# Patient Record
Sex: FEMALE | Race: WHITE | NOT HISPANIC OR LATINO | Employment: OTHER | URBAN - METROPOLITAN AREA
[De-identification: names, ages, dates, MRNs, and addresses within clinical notes are randomized per-mention and may not be internally consistent; named-entity substitution may affect disease eponyms.]

---

## 2017-03-06 ENCOUNTER — HOSPITAL ENCOUNTER (OUTPATIENT)
Facility: MEDICAL CENTER | Age: 82
End: 2017-03-06
Attending: PHYSICIAN ASSISTANT
Payer: MEDICARE

## 2017-03-06 PROCEDURE — 87086 URINE CULTURE/COLONY COUNT: CPT

## 2017-03-09 LAB
BACTERIA UR CULT: NORMAL
SIGNIFICANT IND 70042: NORMAL
SOURCE SOURCE: NORMAL

## 2017-03-17 ENCOUNTER — APPOINTMENT (OUTPATIENT)
Dept: PAIN MANAGEMENT | Facility: REHABILITATION | Age: 82
End: 2017-03-17
Attending: PHYSICAL MEDICINE & REHABILITATION
Payer: MEDICARE

## 2017-04-14 ENCOUNTER — HOSPITAL ENCOUNTER (OUTPATIENT)
Dept: RADIOLOGY | Facility: REHABILITATION | Age: 82
End: 2017-04-14
Attending: PHYSICAL MEDICINE & REHABILITATION
Payer: MEDICARE

## 2017-04-14 ENCOUNTER — HOSPITAL ENCOUNTER (OUTPATIENT)
Dept: PAIN MANAGEMENT | Facility: REHABILITATION | Age: 82
End: 2017-04-14
Attending: PHYSICAL MEDICINE & REHABILITATION
Payer: MEDICARE

## 2017-04-14 VITALS
TEMPERATURE: 97 F | BODY MASS INDEX: 19.76 KG/M2 | HEART RATE: 97 BPM | OXYGEN SATURATION: 100 % | SYSTOLIC BLOOD PRESSURE: 112 MMHG | DIASTOLIC BLOOD PRESSURE: 70 MMHG | RESPIRATION RATE: 18 BRPM | WEIGHT: 115.74 LBS | HEIGHT: 64 IN

## 2017-04-14 PROCEDURE — G0260 INJ FOR SACROILIAC JT ANESTH: HCPCS

## 2017-04-14 PROCEDURE — 700111 HCHG RX REV CODE 636 W/ 250 OVERRIDE (IP)

## 2017-04-14 PROCEDURE — 700101 HCHG RX REV CODE 250

## 2017-04-14 PROCEDURE — 700117 HCHG RX CONTRAST REV CODE 255

## 2017-04-14 RX ORDER — LIDOCAINE HYDROCHLORIDE 20 MG/ML
INJECTION, SOLUTION EPIDURAL; INFILTRATION; INTRACAUDAL; PERINEURAL
Status: COMPLETED
Start: 2017-04-14 | End: 2017-04-14

## 2017-04-14 RX ORDER — TRIAMCINOLONE ACETONIDE 40 MG/ML
INJECTION, SUSPENSION INTRA-ARTICULAR; INTRAMUSCULAR
Status: COMPLETED
Start: 2017-04-14 | End: 2017-04-14

## 2017-04-14 RX ADMIN — TRIAMCINOLONE ACETONIDE 80 MG: 40 INJECTION, SUSPENSION INTRA-ARTICULAR; INTRAMUSCULAR at 10:37

## 2017-04-14 RX ADMIN — IOHEXOL 1 ML: 240 INJECTION, SOLUTION INTRATHECAL; INTRAVASCULAR; INTRAVENOUS; ORAL at 10:36

## 2017-04-14 RX ADMIN — LIDOCAINE HYDROCHLORIDE 5 ML: 20 INJECTION, SOLUTION EPIDURAL; INFILTRATION; INTRACAUDAL; PERINEURAL at 10:34

## 2017-04-14 ASSESSMENT — PAIN SCALES - GENERAL
PAINLEVEL_OUTOF10: 1
PAINLEVEL_OUTOF10: 4

## 2017-04-14 NOTE — PROGRESS NOTES
Pt positioned prone by Mell HERNANDEZ RN and Yuri. Arms resting on table . Pillow placed under feet and lower legs by RN.

## 2017-04-14 NOTE — PROGRESS NOTES
Current meds. See medication reconciliation form. Reviewed with pt. Pt denies taking ASA,other blood thinners or anti-inflammatories. Pt has a ride post-procedure (Elsy, daughter is ). Printed and verbal discharge instructions given to pt who verbalized understanding.

## 2017-04-15 NOTE — PROCEDURES
DATE OF SERVICE:  04/14/2017    PROCEDURES PERFORMED:  1.  Left sacroiliac joint injection with 40 mg of Kenalog under fluoroscopic   guidance.  2.  Right sacroiliac joint injection with 40 mg of Kenalog under fluoroscopic   guidance.    INDICATIONS:  The patient is a patient of [facility] with low   back and buttock region pain, which is felt to be SI joint mediated.  She had   significant improvement with sacroiliac joint injections in September 2016,   but has had return of symptoms, so a repeat procedure was chosen for today.    DESCRIPTION OF PROCEDURE:  After appropriate informed consent was obtained,   she was placed prone on the table.    Her skin was thoroughly cleansed with Betadine swabs x3.    Following this, the subcutaneous, intramuscular, and interligamentous region   was anesthetized with lidocaine.    A 3-1/2 inch 22-gauge spinal needle was directed under intermittent   fluoroscopic guidance to the inferior portion of the sacroiliac joints   bilaterally.    ARTHROGRAM:  Once the joints were felt to have been cannulated, a small amount   of Omnipaque was placed in the joint, which confirmed needle tip placement by   darkening of the joint line.    Lateral views were obtained to ensure proper depth and dye flow.    Kenalog 40 mg along with 0.5 mL of 2% lidocaine was placed into the sacroiliac   joints bilaterally.    She tolerated the procedure well without procedure complications.    She was referred to the recovery area and will follow up in the office in the   next 2-3 weeks to monitor response to the injection.       ____________________________________     MD GERARDO Kurtz / VIDAL    DD:  04/14/2017 10:50:12  DT:  04/14/2017 22:12:07    D#:  [id number]  Job#:  [id number]    cc: AL CRAIG MD

## 2017-05-03 ENCOUNTER — OFFICE VISIT (OUTPATIENT)
Dept: URGENT CARE | Facility: PHYSICIAN GROUP | Age: 82
End: 2017-05-03
Payer: MEDICARE

## 2017-05-03 VITALS
BODY MASS INDEX: 19.22 KG/M2 | TEMPERATURE: 99.1 F | RESPIRATION RATE: 20 BRPM | SYSTOLIC BLOOD PRESSURE: 126 MMHG | HEART RATE: 89 BPM | WEIGHT: 112 LBS | OXYGEN SATURATION: 97 % | DIASTOLIC BLOOD PRESSURE: 46 MMHG

## 2017-05-03 DIAGNOSIS — S41.111A LACERATION OF ARM, RIGHT, INITIAL ENCOUNTER: ICD-10-CM

## 2017-05-03 PROCEDURE — 1036F TOBACCO NON-USER: CPT | Performed by: EMERGENCY MEDICINE

## 2017-05-03 PROCEDURE — G8420 CALC BMI NORM PARAMETERS: HCPCS | Performed by: EMERGENCY MEDICINE

## 2017-05-03 PROCEDURE — 99213 OFFICE O/P EST LOW 20 MIN: CPT | Mod: 25 | Performed by: EMERGENCY MEDICINE

## 2017-05-03 PROCEDURE — 90715 TDAP VACCINE 7 YRS/> IM: CPT | Performed by: EMERGENCY MEDICINE

## 2017-05-03 PROCEDURE — G8432 DEP SCR NOT DOC, RNG: HCPCS | Performed by: EMERGENCY MEDICINE

## 2017-05-03 PROCEDURE — 1101F PT FALLS ASSESS-DOCD LE1/YR: CPT | Mod: 8P | Performed by: EMERGENCY MEDICINE

## 2017-05-03 PROCEDURE — 90471 IMMUNIZATION ADMIN: CPT | Performed by: EMERGENCY MEDICINE

## 2017-05-03 PROCEDURE — 4040F PNEUMOC VAC/ADMIN/RCVD: CPT | Performed by: EMERGENCY MEDICINE

## 2017-05-03 ASSESSMENT — ENCOUNTER SYMPTOMS
COUGH: 0
SPEECH CHANGE: 0
HEADACHES: 0
ABDOMINAL PAIN: 0
SENSORY CHANGE: 0
EYE REDNESS: 0
BACK PAIN: 0
NAUSEA: 0
NECK PAIN: 0
EYE DISCHARGE: 0
FEVER: 0
VOMITING: 0
CHILLS: 0
NERVOUS/ANXIOUS: 0

## 2017-05-03 NOTE — MR AVS SNAPSHOT
Mila Scanlon   5/3/2017 4:15 PM   Office Visit   MRN: [id number]    Department:  [facility]   Dept Phone:  [phone]    Description:  Female : 1926   Provider:  ANIRUDH Talbot M.D.           Reason for Visit     Laceration Lt arm laceration occured when pt fell this afternoon. Pt was alone at the time       Allergies as of 5/3/2017     Allergen Noted Reactions    Ciprofloxacin 2015   Nausea      Vital Signs     Blood Pressure Pulse Temperature Respirations Weight Oxygen Saturation    126/46 mmHg 89 37.3 °C (99.1 °F) 20 50.803 kg (112 lb) 97%    Smoking Status                   Former Smoker           Basic Information     Date Of Birth Sex Race Ethnicity Preferred Language    1926 Female White Non- English      Problem List              ICD-10-CM Priority Class Noted - Resolved    Back pain M54.9   2016 - Present    Weakness R53.1   2016 - Present      Health Maintenance        Date Due Completion Dates    IMM DTaP/Tdap/Td Vaccine (1 - Tdap) 1945 ---    PAP SMEAR 1947 ---    MAMMOGRAM 1966 ---    COLONOSCOPY 1976 ---    IMM ZOSTER VACCINE 1986 ---    BONE DENSITY 1991 ---    IMM PNEUMOCOCCAL 65+ (ADULT) LOW/MEDIUM RISK SERIES (2 of 2 - PPSV23) 2017            Current Immunizations     13-VALENT PCV PREVNAR 2016    Influenza Vaccine Adult HD 10/20/2015  9:42 AM      Below and/or attached are the medications your provider expects you to take. Review all of your home medications and newly ordered medications with your provider and/or pharmacist. Follow medication instructions as directed by your provider and/or pharmacist. Please keep your medication list with you and share with your provider. Update the information when medications are discontinued, doses are changed, or new medications (including over-the-counter products) are added; and carry medication information at all times in the event of emergency  situations     Allergies:  CIPROFLOXACIN - Nausea               Medications  Valid as of: May 03, 2017 -  4:54 PM    Generic Name Brand Name Tablet Size Instructions for use    Acetaminophen (Tab) TYLENOL 500 MG Take 500 mg by mouth every 6 hours as needed for Moderate Pain.        ALPRAZolam (Tab) XANAX 1 MG Take 1 mg by mouth at bedtime as needed for Sleep.        Lisinopril (Tab) PRINIVIL 20 MG Take 20 mg by mouth 2 times a day.        Lovastatin (Tab) MEVACOR 40 MG Take 40 mg by mouth every evening.        .                 Medicines prescribed today were sent to:     [facility]ACY #[id number] - [place] - [address] [place] [address]    Phone: [phone] Fax: [phone]    Open 24 Hours?: No      Medication refill instructions:       If your prescription bottle indicates you have medication refills left, it is not necessary to call your provider’s office. Please contact your pharmacy and they will refill your medication.    If your prescription bottle indicates you do not have any refills left, you may request refills at any time through one of the following ways: The online [employer] system (except Urgent Care), by calling your provider’s office, or by asking your pharmacy to contact your provider’s office with a refill request. Medication refills are processed only during regular business hours and may not be available until the next business day. Your provider may request additional information or to have a follow-up visit with you prior to refilling your medication.   *Please Note: Medication refills are assigned a new Rx number when refilled electronically. Your pharmacy may indicate that no refills were authorized even though a new prescription for the same medication is available at the pharmacy. Please request the medicine by name with the pharmacy before contacting your provider for a refill.           [employer] Access Code: [email or web address]  Expires: 6/2/2017  4:54 PM    [employer]  A secure,  online tool to manage your health information     [employer]’s Xormis® is a secure, online tool that connects you to your personalized health information from the privacy of your home -- day or night - making it very easy for you to manage your healthcare. Once the activation process is completed, you can even access your medical information using the Xormis gagan, which is available for free in the Apple Gagan store or Google Play store.     Xormis provides the following levels of access (as shown below):   My Chart Features   [facility] Primary Care Doctor [facility]  Specialists [facility]  Urgent  Care Non-[facility]  Primary Care  Doctor   Email your healthcare team securely and privately 24/7 X X X    Manage appointments: schedule your next appointment; view details of past/upcoming appointments X      Request prescription refills. X      View recent personal medical records, including lab and immunizations X X X X   View health record, including health history, allergies, medications X X X X   Read reports about your outpatient visits, procedures, consult and ER notes X X X X   See your discharge summary, which is a recap of your hospital and/or ER visit that includes your diagnosis, lab results, and care plan. X X       How to register for Xormis:  1. Go to  [email or web address].  2. Click on the Sign Up Now box, which takes you to the New Member Sign Up page. You will need to provide the following information:  a. Enter your Xormis Access Code exactly as it appears at the top of this page. (You will not need to use this code after you’ve completed the sign-up process. If you do not sign up before the expiration date, you must request a new code.)   b. Enter your date of birth.   c. Enter your home email address.   d. Click Submit, and follow the next screen’s instructions.  3. Create a Xormis ID. This will be your Xormis login ID and cannot be changed, so think of one that is secure and easy to  remember.  4. Create a [employer] password. You can change your password at any time.  5. Enter your Password Reset Question and Answer. This can be used at a later time if you forget your password.   6. Enter your e-mail address. This allows you to receive e-mail notifications when new information is available in [employer].  7. Click Sign Up. You can now view your health information.    For assistance activating your [employer] account, call [phone]

## 2017-05-03 NOTE — PROGRESS NOTES
Subjective:      Mila Scanlon is a 89 y.o. female who presents with Laceration            Laceration   The incident occurred 1 to 3 hours ago. The laceration is located on the left arm and right arm (patient fell approximately an hour ago at home causing lacerations to her very thin skin of her left arm and right arm at the elbow. The left arm laceration is greater than 11cm probably closer to 20. There is no extremity pain or numbness., full ROM.). The laceration mechanism was a blunt object. The pain is moderate. The pain has been constant since onset. She reports no foreign bodies present. Her tetanus status is UTD.     Allergies   Allergen Reactions   • Ciprofloxacin Nausea     Social History     Social History   • Marital Status:      Spouse Name: N/A   • Number of Children: N/A   • Years of Education: N/A     Occupational History   • Not on file.     Social History Main Topics   • Smoking status: Former Smoker     Quit date: 10/01/2015   • Smokeless tobacco: Not on file   • Alcohol Use: No   • Drug Use: No   • Sexual Activity: Not Currently     Other Topics Concern   • Not on file     Social History Narrative     Past Medical History   Diagnosis Date   • Hypertension    • High cholesterol    • IBS (irritable bowel syndrome)    • Anxiety    • Back pain    • Cataract    • Arthritis    • Skin cancer 2013   • Carotid artery obstruction 2008   • Anxiety    • Kidney stones 2008     Current Outpatient Prescriptions on File Prior to Visit   Medication Sig Dispense Refill   • lovastatin (MEVACOR) 40 MG tablet Take 40 mg by mouth every evening.     • acetaminophen (TYLENOL) 500 MG Tab Take 500 mg by mouth every 6 hours as needed for Moderate Pain.     • lisinopril (PRINIVIL) 20 MG TABS Take 20 mg by mouth 2 times a day.     • alprazolam (XANAX) 1 MG TABS Take 1 mg by mouth at bedtime as needed for Sleep.       No current facility-administered medications on file prior to visit.     Family History   Problem  Relation Age of Onset   • Heart Disease Mother    • Heart Disease Sister      Review of Systems   Constitutional: Negative for fever and chills.   Eyes: Negative for discharge and redness.   Respiratory: Negative for cough.    Cardiovascular: Negative for chest pain.   Gastrointestinal: Negative for nausea, vomiting and abdominal pain.   Musculoskeletal: Negative for back pain, joint pain and neck pain.   Skin: Negative for itching and rash.   Neurological: Negative for sensory change, speech change and headaches.   Psychiatric/Behavioral: The patient is not nervous/anxious.           Objective:     /46 mmHg  Pulse 89  Temp(Src) 37.3 °C (99.1 °F)  Resp 20  Wt 50.803 kg (112 lb)  SpO2 97%     Physical Exam   Constitutional: She appears well-developed and well-nourished. No distress.   HENT:   Head: Normocephalic and atraumatic.   Right Ear: External ear normal.   Eyes: Conjunctivae are normal. Right eye exhibits no discharge. Left eye exhibits no discharge.   Neck: Normal range of motion.   Cardiovascular: Normal rate.    Pulmonary/Chest: Effort normal and breath sounds normal. No respiratory distress. She has no wheezes.   Abdominal: She exhibits no distension. There is no tenderness.   Musculoskeletal: Normal range of motion.   Neurological: She is alert. No cranial nerve deficit. Coordination normal.   Skin: Skin is warm. No rash noted. She is not diaphoretic. No erythema.   Patient has a large U shaped laceration on the medial aspect of her left arm approximately 11-20 cm in length. The laceration occurred in extremely thin skin which cannot be sutured affectively    Patient has a 1 cm laceration on the medial aspect of her right distal arm also due to very  thin skin.    She has a normal neurovascular exam distal to the injuries.   Psychiatric: She has a normal mood and affect. Her behavior is normal.             Procedure: The patient's lacerations are full thickness,, unable to be sutured due to her  frail skin condition. There will be cleaned and Steri-Strips will be placed by the medical assistant       Dagnosis: Fall:    Laceration to the left arm 10 cm plus     Laceration to the right arm 1 cm    Patient will return in 2 days for reevaluation. She is aware of the potential non-viability of these flaps and if they do survive the duration will be prolonged for healing. The wounds were covered with Steri-Strips and Xeroform with bulky Coban dressings placed around the left upper arm. A smaller dressing was placed around the right distal arm/elbow.

## 2017-05-05 ENCOUNTER — OFFICE VISIT (OUTPATIENT)
Dept: URGENT CARE | Facility: PHYSICIAN GROUP | Age: 82
End: 2017-05-05
Payer: MEDICARE

## 2017-05-05 VITALS
OXYGEN SATURATION: 96 % | HEIGHT: 66 IN | DIASTOLIC BLOOD PRESSURE: 50 MMHG | HEART RATE: 68 BPM | BODY MASS INDEX: 18 KG/M2 | WEIGHT: 112 LBS | TEMPERATURE: 97.3 F | RESPIRATION RATE: 16 BRPM | SYSTOLIC BLOOD PRESSURE: 110 MMHG

## 2017-05-05 DIAGNOSIS — S41.111D LACERATION OF ARM, RIGHT, SUBSEQUENT ENCOUNTER: ICD-10-CM

## 2017-05-05 PROCEDURE — 99024 POSTOP FOLLOW-UP VISIT: CPT | Performed by: NURSE PRACTITIONER

## 2017-05-05 PROCEDURE — G8419 CALC BMI OUT NRM PARAM NOF/U: HCPCS | Performed by: NURSE PRACTITIONER

## 2017-05-05 RX ORDER — ALPRAZOLAM 0.25 MG/1
TABLET ORAL
Refills: 3 | COMMUNITY
Start: 2017-04-19 | End: 2017-07-05

## 2017-05-05 RX ORDER — AMLODIPINE BESYLATE 5 MG/1
5 TABLET ORAL
Refills: 3 | COMMUNITY
Start: 2017-03-02 | End: 2017-07-05

## 2017-05-05 ASSESSMENT — ENCOUNTER SYMPTOMS: FEVER: 0

## 2017-05-05 ASSESSMENT — PAIN SCALES - GENERAL: PAINLEVEL: NO PAIN

## 2017-05-05 NOTE — MR AVS SNAPSHOT
"        Mila CASE Scanlon   2017 9:40 AM   Office Visit   MRN: [id number]    Department:  [facility]   Dept Phone:  [phone]    Description:  Female : 1926   Provider:  ALISON Magallon           Reason for Visit     Wound Check Pt is here for wound check on Lt upper arm and Rt elbow.      Allergies as of 2017     Allergen Noted Reactions    Ciprofloxacin 2015   Nausea      Vital Signs     Blood Pressure Pulse Temperature Respirations Height Weight    110/50 mmHg 68 36.3 °C (97.3 °F) 16 1.676 m (5' 6\") 50.803 kg (112 lb)    Body Mass Index Oxygen Saturation Breastfeeding? Smoking Status          18.09 kg/m2 96% No Former Smoker        Basic Information     Date Of Birth Sex Race Ethnicity Preferred Language    1926 Female White Non- English      Problem List              ICD-10-CM Priority Class Noted - Resolved    Back pain M54.9   2016 - Present    Weakness R53.1   2016 - Present      Health Maintenance        Date Due Completion Dates    PAP SMEAR 1947 ---    MAMMOGRAM 1966 ---    COLONOSCOPY 1976 ---    IMM ZOSTER VACCINE 1986 ---    BONE DENSITY 1991 ---    IMM PNEUMOCOCCAL 65+ (ADULT) LOW/MEDIUM RISK SERIES (2 of 2 - PPSV23) 2017    IMM DTaP/Tdap/Td Vaccine (2 - Td) 5/3/2027 5/3/2017            Current Immunizations     13-VALENT PCV PREVNAR 2016    Influenza Vaccine Adult HD 10/20/2015  9:42 AM    Tdap Vaccine 5/3/2017  4:20 PM      Below and/or attached are the medications your provider expects you to take. Review all of your home medications and newly ordered medications with your provider and/or pharmacist. Follow medication instructions as directed by your provider and/or pharmacist. Please keep your medication list with you and share with your provider. Update the information when medications are discontinued, doses are changed, or new medications (including over-the-counter products) are added; and " carry medication information at all times in the event of emergency situations     Allergies:  CIPROFLOXACIN - Nausea               Medications  Valid as of: May 05, 2017 - 10:51 AM    Generic Name Brand Name Tablet Size Instructions for use    Acetaminophen (Tab) TYLENOL 500 MG Take 500 mg by mouth every 6 hours as needed for Moderate Pain.        ALPRAZolam (Tab) XANAX 1 MG Take 1 mg by mouth at bedtime as needed for Sleep.        ALPRAZolam (Tab) XANAX 0.25 MG TAKE 1 TAB BY MOUTH 1-2 TIMES A DAY FOR ANXIETY        AmLODIPine Besylate (Tab) NORVASC 5 MG Take 5 mg by mouth.        Lisinopril (Tab) PRINIVIL 20 MG Take 20 mg by mouth 2 times a day.        Lovastatin (Tab) MEVACOR 40 MG Take 40 mg by mouth every evening.        .                 Medicines prescribed today were sent to:     [facility]/PHARMACY #[id number] - [place], [place] - [address] [place] [address]    Phone: [phone] Fax: [phone]    Open 24 Hours?: No      Medication refill instructions:       If your prescription bottle indicates you have medication refills left, it is not necessary to call your provider’s office. Please contact your pharmacy and they will refill your medication.    If your prescription bottle indicates you do not have any refills left, you may request refills at any time through one of the following ways: The online [employer] system (except Urgent Care), by calling your provider’s office, or by asking your pharmacy to contact your provider’s office with a refill request. Medication refills are processed only during regular business hours and may not be available until the next business day. Your provider may request additional information or to have a follow-up visit with you prior to refilling your medication.   *Please Note: Medication refills are assigned a new Rx number when refilled electronically. Your pharmacy may indicate that no refills were authorized even though a new prescription for the same medication is  available at the pharmacy. Please request the medicine by name with the pharmacy before contacting your provider for a refill.           Beneq Access Code: [email or web address]  Expires: 6/2/2017  4:54 PM    [employer]  A secure, online tool to manage your health information     [employer]’s Beneq® is a secure, online tool that connects you to your personalized health information from the privacy of your home -- day or night - making it very easy for you to manage your healthcare. Once the activation process is completed, you can even access your medical information using the Beneq gagan, which is available for free in the Apple Gagan store or Google Play store.     Beneq provides the following levels of access (as shown below):   My Chart Features   [facility] Primary [facility] Doctor [facility]  Specialists [facility]  Urgent  Care Non-[facility]  Primary Care  Doctor   Email your healthcare team securely and privately 24/7 X X X    Manage appointments: schedule your next appointment; view details of past/upcoming appointments X      Request prescription refills. X      View recent personal medical records, including lab and immunizations X X X X   View health record, including health history, allergies, medications X X X X   Read reports about your outpatient visits, procedures, consult and ER notes X X X X   See your discharge summary, which is a recap of your hospital and/or ER visit that includes your diagnosis, lab results, and care plan. X X       How to register for Beneq:  1. Go to  [email or web address].  2. Click on the Sign Up Now box, which takes you to the New Member Sign Up page. You will need to provide the following information:  a. Enter your Beneq Access Code exactly as it appears at the top of this page. (You will not need to use this code after you’ve completed the sign-up process. If you do not sign up before the expiration date, you must request a new code.)   b. Enter your date of birth.   c. Enter  your home email address.   d. Click Submit, and follow the next screen’s instructions.  3. Create a Solaiemest ID. This will be your i[employer]ician login ID and cannot be changed, so think of one that is secure and easy to remember.  4. Create a [employer] password. You can change your password at any time.  5. Enter your Password Reset Question and Answer. This can be used at a later time if you forget your password.   6. Enter your e-mail address. This allows you to receive e-mail notifications when new information is available in [employer]ician.  7. Click Sign Up. You can now view your health information.    For assistance activating your [employer] account, call [phone]

## 2017-05-05 NOTE — PROGRESS NOTES
"Subjective:      Mila Scanlon is a 89 y.o. female who presents with Wound Check            Wound Check  Treated in ED: 2 days ago. Previous treatment included laceration repair. Maximum temperature: no temp. There has been no drainage from the wound. The redness has improved. The swelling has improved. The pain has improved. She has no difficulty moving the affected extremity or digit.       Review of Systems   Constitutional: Negative for fever.   Skin:        Healing laceration to left arm   All other systems reviewed and are negative.         Objective:     /50 mmHg  Pulse 68  Temp(Src) 36.3 °C (97.3 °F)  Resp 16  Ht 1.676 m (5' 6\")  Wt 50.803 kg (112 lb)  BMI 18.09 kg/m2  SpO2 96%  Breastfeeding? No     Physical Exam   Constitutional: She is oriented to person, place, and time. Vital signs are normal. She appears well-developed and well-nourished.   HENT:   Head: Normocephalic and atraumatic.   Eyes: EOM are normal. Pupils are equal, round, and reactive to light.   Neck: Normal range of motion.   Cardiovascular: Normal rate and regular rhythm.    Pulmonary/Chest: Effort normal.   Musculoskeletal: Normal range of motion.   Neurological: She is alert and oriented to person, place, and time.   Skin: Skin is warm and dry.   Patient has a large U shaped laceration on the lateral aspect of her left arm approximately 20 cm in length. The laceration occurred in extremely thin skin which cannot be sutured affectively. Skin cleansed and approximated with steristrips. No redness or drainage present. Granulation tissue present    Patient has a 1 cm laceration on the medial aspect of her right elbow arm also due to very  thin skin. No redness or drainage, granulation tissue present.    She has a normal neurovascular exam distal to the injuries.   Psychiatric: She has a normal mood and affect. Her speech is normal and behavior is normal. Thought content normal.   Vitals reviewed.         Left arm: approx. 10cm " of lac that is approximated with steri strips is healing well. 5-6cm of additional skin was not well approximated and was folded back. Poor vascular flow to this part of damaged skin, debrided and extra skin removed  Xeroform gauze and telfa applied   Right arm: topical antibiotics applied and covered with telfa dressing     Assessment/Plan:     1. Laceration of arm, right, subsequent encounter  Follow up in two days for wound check    Instructed to return to  or nearest emergency department if symptoms fail to improve, for any change in condition, further concerns, or new concerning symptoms.  Patient states understanding of the plan of care and discharge instructions.

## 2017-05-07 ENCOUNTER — OFFICE VISIT (OUTPATIENT)
Dept: URGENT CARE | Facility: PHYSICIAN GROUP | Age: 82
End: 2017-05-07
Payer: MEDICARE

## 2017-05-07 VITALS
SYSTOLIC BLOOD PRESSURE: 116 MMHG | HEIGHT: 66 IN | HEART RATE: 65 BPM | BODY MASS INDEX: 18 KG/M2 | WEIGHT: 112 LBS | RESPIRATION RATE: 14 BRPM | OXYGEN SATURATION: 98 % | DIASTOLIC BLOOD PRESSURE: 62 MMHG | TEMPERATURE: 98.1 F

## 2017-05-07 DIAGNOSIS — S40.811D: ICD-10-CM

## 2017-05-07 DIAGNOSIS — Z51.89 VISIT FOR WOUND CHECK: ICD-10-CM

## 2017-05-07 DIAGNOSIS — S41.112D LACERATION OF LEFT UPPER ARM, SUBSEQUENT ENCOUNTER: ICD-10-CM

## 2017-05-07 PROCEDURE — 4040F PNEUMOC VAC/ADMIN/RCVD: CPT | Performed by: NURSE PRACTITIONER

## 2017-05-07 PROCEDURE — G8419 CALC BMI OUT NRM PARAM NOF/U: HCPCS | Performed by: NURSE PRACTITIONER

## 2017-05-07 PROCEDURE — 1101F PT FALLS ASSESS-DOCD LE1/YR: CPT | Mod: 8P | Performed by: NURSE PRACTITIONER

## 2017-05-07 PROCEDURE — 99212 OFFICE O/P EST SF 10 MIN: CPT | Performed by: NURSE PRACTITIONER

## 2017-05-07 PROCEDURE — G8432 DEP SCR NOT DOC, RNG: HCPCS | Performed by: NURSE PRACTITIONER

## 2017-05-07 PROCEDURE — 1036F TOBACCO NON-USER: CPT | Performed by: NURSE PRACTITIONER

## 2017-05-07 NOTE — MR AVS SNAPSHOT
"        Mila Scanlon   2017 1:15 PM   Office Visit   MRN: [id number]    Department:  [facility]   Dept Phone:  [phone]    Description:  Female : 1926   Provider:  ALISON Yee           Reason for Visit     Wound Check right arm laceration follow up      Allergies as of 2017     Allergen Noted Reactions    Ciprofloxacin 2015   Nausea      You were diagnosed with     Laceration of left upper arm, subsequent encounter   [[id number]]         Vital Signs     Blood Pressure Pulse Temperature Respirations Height Weight    116/62 mmHg 65 36.7 °C (98.1 °F) 14 1.676 m (5' 5.98\") 50.803 kg (112 lb)    Body Mass Index Oxygen Saturation Smoking Status             18.09 kg/m2 98% Former Smoker         Basic Information     Date Of Birth Sex Race Ethnicity Preferred Language    1926 Female White Non- English      Problem List              ICD-10-CM Priority Class Noted - Resolved    Back pain M54.9   2016 - Present    Weakness R53.1   2016 - Present      Health Maintenance        Date Due Completion Dates    PAP SMEAR 1947 ---    MAMMOGRAM 1966 ---    COLONOSCOPY 1976 ---    IMM ZOSTER VACCINE 1986 ---    BONE DENSITY 1991 ---    IMM PNEUMOCOCCAL 65+ (ADULT) LOW/MEDIUM RISK SERIES (2 of 2 - PPSV23) 2017    IMM DTaP/Tdap/Td Vaccine (2 - Td) 5/3/2027 5/3/2017            Current Immunizations     13-VALENT PCV PREVNAR 2016    Influenza Vaccine Adult HD 10/20/2015  9:42 AM    Tdap Vaccine 5/3/2017  4:20 PM      Below and/or attached are the medications your provider expects you to take. Review all of your home medications and newly ordered medications with your provider and/or pharmacist. Follow medication instructions as directed by your provider and/or pharmacist. Please keep your medication list with you and share with your provider. Update the information when medications are discontinued, doses are changed, or new " medications (including over-the-counter products) are added; and carry medication information at all times in the event of emergency situations     Allergies:  CIPROFLOXACIN - Nausea               Medications  Valid as of: May 07, 2017 -  1:53 PM    Generic Name Brand Name Tablet Size Instructions for use    Acetaminophen (Tab) TYLENOL 500 MG Take 500 mg by mouth every 6 hours as needed for Moderate Pain.        ALPRAZolam (Tab) XANAX 1 MG Take 1 mg by mouth at bedtime as needed for Sleep.        ALPRAZolam (Tab) XANAX 0.25 MG TAKE 1 TAB BY MOUTH 1-2 TIMES A DAY FOR ANXIETY        AmLODIPine Besylate (Tab) NORVASC 5 MG Take 5 mg by mouth.        Lisinopril (Tab) PRINIVIL 20 MG Take 20 mg by mouth 2 times a day.        Lovastatin (Tab) MEVACOR 40 MG Take 40 mg by mouth every evening.        .                 Medicines prescribed today were sent to:     [facility]/PHARMACY #[id number] - [place] - [address] [place] [address]    Phone: [phone] Fax: [phone]    Open 24 Hours?: No      Medication refill instructions:       If your prescription bottle indicates you have medication refills left, it is not necessary to call your provider’s office. Please contact your pharmacy and they will refill your medication.    If your prescription bottle indicates you do not have any refills left, you may request refills at any time through one of the following ways: The online [employer] system (except Urgent Care), by calling your provider’s office, or by asking your pharmacy to contact your provider’s office with a refill request. Medication refills are processed only during regular business hours and may not be available until the next business day. Your provider may request additional information or to have a follow-up visit with you prior to refilling your medication.   *Please Note: Medication refills are assigned a new Rx number when refilled electronically. Your pharmacy may indicate that no refills were authorized  even though a new prescription for the same medication is available at the pharmacy. Please request the medicine by name with the pharmacy before contacting your provider for a refill.           [employer] Access Code: [email or web address]  Expires: 6/2/2017  4:54 PM    [employer]  A secure, online tool to manage your health information     [employer]’s [employer]® is a secure, online tool that connects you to your personalized health information from the privacy of your home -- day or night - making it very easy for you to manage your healthcare. Once the activation process is completed, you can even access your medical information using the [employer] gagan, which is available for free in the Apple Gagan store or Google Play store.     [employer] provides the following levels of access (as shown below):   My Chart Features   [facility] Doctor [facility]  Specialists [facility]  Urgent  Care Non-[facility]  Primary Care  Doctor   Email your healthcare team securely and privately 24/7 X X X    Manage appointments: schedule your next appointment; view details of past/upcoming appointments X      Request prescription refills. X      View recent personal medical records, including lab and immunizations X X X X   View health record, including health history, allergies, medications X X X X   Read reports about your outpatient visits, procedures, consult and ER notes X X X X   See your discharge summary, which is a recap of your hospital and/or ER visit that includes your diagnosis, lab results, and care plan. X X       How to register for [employer]:  1. Go to  [email or web address].  2. Click on the Sign Up Now box, which takes you to the New Member Sign Up page. You will need to provide the following information:  a. Enter your [employer] Access Code exactly as it appears at the top of this page. (You will not need to use this code after you’ve completed the sign-up process. If you do not sign up before the expiration date, you must request  a new code.)   b. Enter your date of birth.   c. Enter your home email address.   d. Click Submit, and follow the next screen’s instructions.  3. Create a [employer] ID. This will be your [employer] login ID and cannot be changed, so think of one that is secure and easy to remember.  4. Create a [employer] password. You can change your password at any time.  5. Enter your Password Reset Question and Answer. This can be used at a later time if you forget your password.   6. Enter your e-mail address. This allows you to receive e-mail notifications when new information is available in [employer].  7. Click Sign Up. You can now view your health information.    For assistance activating your [employer] account, call [phone]

## 2017-05-07 NOTE — PROGRESS NOTES
Chief Complaint   Patient presents with   • Wound Check     right arm laceration follow up       HISTORY OF PRESENT ILLNESS: Patient is a 89 y.o. female who presents today to have her laceration re-evaluated. States she accidentally fell on 5/3/17 causing a skin tear to her left upper arm. Was seen in UC that day, steri strips were applied, was told to return in two days for re-eval. She returned to the clinic on 5/5/17 for the same and was told to come back today for re-evaluation. A dressing was applied at that time, she has not changed the dressing since. She denies associated fever, chills, nausea, vomiting, numbness, tingling or malaise.    Patient Active Problem List    Diagnosis Date Noted   • Back pain 05/16/2016   • Weakness 05/16/2016       Allergies:Ciprofloxacin    Current Outpatient Prescriptions Ordered in [facility]   Medication Sig Dispense Refill   • alprazolam (XANAX) 0.25 MG Tab TAKE 1 TAB BY MOUTH 1-2 TIMES A DAY FOR ANXIETY  3   • amlodipine (NORVASC) 5 MG Tab Take 5 mg by mouth.  3   • lovastatin (MEVACOR) 40 MG tablet Take 40 mg by mouth every evening.     • acetaminophen (TYLENOL) 500 MG Tab Take 500 mg by mouth every 6 hours as needed for Moderate Pain.     • lisinopril (PRINIVIL) 20 MG TABS Take 20 mg by mouth 2 times a day.     • alprazolam (XANAX) 1 MG TABS Take 1 mg by mouth at bedtime as needed for Sleep.       No current Epic-ordered facility-administered medications on file.       Past Medical History   Diagnosis Date   • Hypertension    • High cholesterol    • IBS (irritable bowel syndrome)    • Anxiety    • Back pain    • Cataract    • Arthritis    • Skin cancer 2013   • Carotid artery obstruction 2008   • Anxiety    • Kidney stones 2008       Social History   Substance Use Topics   • Smoking status: Former Smoker     Quit date: 10/01/2015   • Smokeless tobacco: Not on file   • Alcohol Use: No       No family status information on file.     Family History   Problem Relation Age of Onset  "  • Heart Disease Mother    • Heart Disease Sister        ROS:  Review of Systems   Constitutional: Negative for fever, chills, weight loss, malaise, and fatigue.   HENT: Negative for ear pain, nosebleeds, congestion, sore throat and neck pain.    Neuro: Negative for headache, sensory changes, weakness, seizure, LOC.   Cardiovascular: Negative for chest pain, palpitations, orthopnea and leg swelling.   Respiratory: Negative for cough, sputum production, shortness of breath and wheezing.   Gastrointestinal: Negative for abdominal pain, nausea, vomiting or diarrhea.   Musculoskeletal: Negative for falls, neck pain, back pain, joint pain, myalgias.   Skin: Negative for rash, diaphoresis. Positive for skin tear to the left upper arm.    Exam:  Blood pressure 116/62, pulse 65, temperature 36.7 °C (98.1 °F), resp. rate 14, height 1.676 m (5' 5.98\"), weight 50.803 kg (112 lb), SpO2 98 %.  General: well-nourished, well-developed female in NAD  Head: normocephalic, atraumatic  Eyes: PERRLA, no conjunctival injection, acuity grossly intact, lids normal.  Ears: normal shape and symmetry, gross auditory acuity is intact.  Nose: symmetrical without tenderness, no discharge.  Mouth/Throat: reasonable hygiene, no erythema, exudates or tonsillar enlargement.  Neck: no masses, range of motion within normal limits, no tracheal deviation. No obvious thyroid enlargement.   Neuro: alert and oriented. Cranial nerves 1-12 grossly intact. No sensory deficit.   Cardiovascular: regular rate and rhythm. No edema.  Pulmonary: no distress. Chest is symmetrical with respiration, no wheezes, crackles, or rhonchi.   Musculoskeletal: no clubbing, appropriate muscle tone, gait is stable.  Skin: Patient has a large U shaped laceration on the lateral aspect of her left arm approximately 20 cm in length, steri strips in place. No surrounding erythema or swelling present. Granulation tissue present, appears to be healing well.   Patient has an " additional 1 cm laceration on the medial aspect of her right elbow. No redness or drainage, granulation tissue present.  She has a normal neurovascular exam distal to the injuries.   Psych: appropriate mood, affect, judgement.         Assessment/Plan:  1. Visit for wound check     2. Laceration of left upper arm, subsequent encounter     3. Abrasion of right arm, subsequent encounter           Wounds appear to be healing well without signs of infection. Dressings changed in the clinic today. Discussed I would like to have the patient back for reevaluation in 3 days, sooner with the development of any worsening symptoms.  Supportive care, differential diagnoses, and indications for immediate follow-up discussed with patient.   Pathogenesis of diagnosis discussed including typical length and natural progression.   Instructed to return to clinic or nearest emergency department for any change in condition, further concerns, or worsening of symptoms.  Patient states understanding of the plan of care and discharge instructions.          Please note that this dictation was created using voice recognition software. I have made every reasonable attempt to correct obvious errors, but I expect that there are errors of grammar and possibly content that I did not discover before finalizing the note.      GRAHAM Brooks.

## 2017-05-10 ENCOUNTER — OFFICE VISIT (OUTPATIENT)
Dept: URGENT CARE | Facility: PHYSICIAN GROUP | Age: 82
End: 2017-05-10
Payer: MEDICARE

## 2017-05-10 VITALS
HEART RATE: 70 BPM | RESPIRATION RATE: 16 BRPM | HEIGHT: 69 IN | DIASTOLIC BLOOD PRESSURE: 56 MMHG | BODY MASS INDEX: 16.59 KG/M2 | SYSTOLIC BLOOD PRESSURE: 114 MMHG | TEMPERATURE: 97.2 F | OXYGEN SATURATION: 95 % | WEIGHT: 112 LBS

## 2017-05-10 DIAGNOSIS — S51.012D SKIN TEAR OF LEFT ELBOW WITHOUT COMPLICATION, SUBSEQUENT ENCOUNTER: ICD-10-CM

## 2017-05-10 PROCEDURE — 4040F PNEUMOC VAC/ADMIN/RCVD: CPT | Performed by: PHYSICIAN ASSISTANT

## 2017-05-10 PROCEDURE — 1101F PT FALLS ASSESS-DOCD LE1/YR: CPT | Mod: 8P | Performed by: PHYSICIAN ASSISTANT

## 2017-05-10 PROCEDURE — G8432 DEP SCR NOT DOC, RNG: HCPCS | Performed by: PHYSICIAN ASSISTANT

## 2017-05-10 PROCEDURE — 1036F TOBACCO NON-USER: CPT | Performed by: PHYSICIAN ASSISTANT

## 2017-05-10 PROCEDURE — 99212 OFFICE O/P EST SF 10 MIN: CPT | Performed by: PHYSICIAN ASSISTANT

## 2017-05-10 PROCEDURE — G8419 CALC BMI OUT NRM PARAM NOF/U: HCPCS | Performed by: PHYSICIAN ASSISTANT

## 2017-05-10 ASSESSMENT — ENCOUNTER SYMPTOMS
NEUROLOGICAL NEGATIVE: 1
RESPIRATORY NEGATIVE: 1
MUSCULOSKELETAL NEGATIVE: 1
CARDIOVASCULAR NEGATIVE: 1
ROS SKIN COMMENTS: TEAR
PSYCHIATRIC NEGATIVE: 1
EYES NEGATIVE: 1
GASTROINTESTINAL NEGATIVE: 1
CONSTITUTIONAL NEGATIVE: 1

## 2017-05-10 ASSESSMENT — PAIN SCALES - GENERAL: PAINLEVEL: NO PAIN

## 2017-05-10 NOTE — MR AVS SNAPSHOT
"        Mila Scanlon   5/10/2017 11:05 AM   Office Visit   MRN: [id number]    Department:  [facility]   Dept Phone:  [phone]    Description:  Female : 1926   Provider:  Barber Reynolds PA-C           Reason for Visit     Wound Check Wound check of Lt arm.      Allergies as of 5/10/2017     Allergen Noted Reactions    Ciprofloxacin 2015   Nausea      Vital Signs     Blood Pressure Pulse Temperature Respirations Height Weight    114/56 mmHg 95 36.2 °C (97.2 °F) 16 1.753 m (5' 9\") 50.803 kg (112 lb)    Body Mass Index Oxygen Saturation Breastfeeding? Smoking Status          16.53 kg/m2 70% No Former Smoker        Basic Information     Date Of Birth Sex Race Ethnicity Preferred Language    1926 Female White Non- English      Problem List              ICD-10-CM Priority Class Noted - Resolved    Back pain M54.9   2016 - Present    Weakness R53.1   2016 - Present      Health Maintenance        Date Due Completion Dates    PAP SMEAR 1947 ---    MAMMOGRAM 1966 ---    COLONOSCOPY 1976 ---    IMM ZOSTER VACCINE 1986 ---    BONE DENSITY 1991 ---    IMM PNEUMOCOCCAL 65+ (ADULT) LOW/MEDIUM RISK SERIES (2 of 2 - PPSV23) 2017    IMM DTaP/Tdap/Td Vaccine (2 - Td) 5/3/2027 5/3/2017            Current Immunizations     13-VALENT PCV PREVNAR 2016    Influenza Vaccine Adult HD 10/20/2015  9:42 AM    Tdap Vaccine 5/3/2017  4:20 PM      Below and/or attached are the medications your provider expects you to take. Review all of your home medications and newly ordered medications with your provider and/or pharmacist. Follow medication instructions as directed by your provider and/or pharmacist. Please keep your medication list with you and share with your provider. Update the information when medications are discontinued, doses are changed, or new medications (including over-the-counter products) are added; and carry medication information at all " times in the event of emergency situations     Allergies:  CIPROFLOXACIN - Nausea               Medications  Valid as of: May 10, 2017 -  1:03 PM    Generic Name Brand Name Tablet Size Instructions for use    Acetaminophen (Tab) TYLENOL 500 MG Take 500 mg by mouth every 6 hours as needed for Moderate Pain.        ALPRAZolam (Tab) XANAX 1 MG Take 1 mg by mouth at bedtime as needed for Sleep.        ALPRAZolam (Tab) XANAX 0.25 MG TAKE 1 TAB BY MOUTH 1-2 TIMES A DAY FOR ANXIETY        AmLODIPine Besylate (Tab) NORVASC 5 MG Take 5 mg by mouth.        Lisinopril (Tab) PRINIVIL 20 MG Take 20 mg by mouth 2 times a day.        Lovastatin (Tab) MEVACOR 40 MG Take 40 mg by mouth every evening.        .                 Medicines prescribed today were sent to:     [facility]/PHARMACY #[id number] - [place] - [address]    [address] [place] [address]    Phone: [phone] Fax: [phone]    Open 24 Hours?: No      Medication refill instructions:       If your prescription bottle indicates you have medication refills left, it is not necessary to call your provider’s office. Please contact your pharmacy and they will refill your medication.    If your prescription bottle indicates you do not have any refills left, you may request refills at any time through one of the following ways: The online [employer] system (except Urgent Care), by calling your provider’s office, or by asking your pharmacy to contact your provider’s office with a refill request. Medication refills are processed only during regular business hours and may not be available until the next business day. Your provider may request additional information or to have a follow-up visit with you prior to refilling your medication.   *Please Note: Medication refills are assigned a new Rx number when refilled electronically. Your pharmacy may indicate that no refills were authorized even though a new prescription for the same medication is available at the pharmacy. Please request  the medicine by name with the pharmacy before contacting your provider for a refill.           [employer] Access Code: [email or web address]  Expires: 6/2/2017  4:54 PM    [employer]  A secure, online tool to manage your health information     [employer]’s [employer]® is a secure, online tool that connects you to your personalized health information from the privacy of your home -- day or night - making it very easy for you to manage your healthcare. Once the activation process is completed, you can even access your medical information using the [employer] gagan, which is available for free in the Apple Gagan store or Google Play store.     [employer] provides the following levels of access (as shown below):   My Chart Features   [facility] Doctor [facility]  Specialists [facility]  Urgent  Care Non-[facility]  Primary Care  Doctor   Email your healthcare team securely and privately 24/7 X X X    Manage appointments: schedule your next appointment; view details of past/upcoming appointments X      Request prescription refills. X      View recent personal medical records, including lab and immunizations X X X X   View health record, including health history, allergies, medications X X X X   Read reports about your outpatient visits, procedures, consult and ER notes X X X X   See your discharge summary, which is a recap of your hospital and/or ER visit that includes your diagnosis, lab results, and care plan. X X       How to register for [employer]:  1. Go to  [email or web address].  2. Click on the Sign Up Now box, which takes you to the New Member Sign Up page. You will need to provide the following information:  a. Enter your [employer] Access Code exactly as it appears at the top of this page. (You will not need to use this code after you’ve completed the sign-up process. If you do not sign up before the expiration date, you must request a new code.)   b. Enter your date of birth.   c. Enter your home email address.   d. Click Submit,  and follow the next screen’s instructions.  3. Create a [employer] ID. This will be your [employer] login ID and cannot be changed, so think of one that is secure and easy to remember.  4. Create a [employer] password. You can change your password at any time.  5. Enter your Password Reset Question and Answer. This can be used at a later time if you forget your password.   6. Enter your e-mail address. This allows you to receive e-mail notifications when new information is available in [employer].  7. Click Sign Up. You can now view your health information.    For assistance activating your [employer] account, call [phone]

## 2017-05-10 NOTE — PROGRESS NOTES
Subjective:      Mila Scanlon is a 89 y.o. female who presents with Wound Check            Wound Check       Chief Complaint   Patient presents with   • Wound Check     Wound check of Lt arm.       HPI:  Mila Scanlon is a 89 y.o. feamle who presents with son with skin tear to left upper arm. No pain or worsening redness. Patient denies any purulent discharge. No tingling or itching.  Patient denies HA, SOB, chest pain, palpitations, fever, chills, or n/v/d.      Past Medical History   Diagnosis Date   • Hypertension    • High cholesterol    • IBS (irritable bowel syndrome)    • Anxiety    • Back pain    • Cataract    • Arthritis    • Skin cancer 2013   • Carotid artery obstruction 2008   • Anxiety    • Kidney stones 2008       Past Surgical History   Procedure Laterality Date   • Hysterectomy laparoscopy         Family History   Problem Relation Age of Onset   • Heart Disease Mother    • Heart Disease Sister        Social History     Social History   • Marital Status:      Spouse Name: N/A   • Number of Children: N/A   • Years of Education: N/A     Occupational History   • Not on file.     Social History Main Topics   • Smoking status: Former Smoker     Quit date: 10/01/2015   • Smokeless tobacco: Not on file   • Alcohol Use: No   • Drug Use: No   • Sexual Activity: Not Currently     Other Topics Concern   • Not on file     Social History Narrative         Current outpatient prescriptions:   •  alprazolam, TAKE 1 TAB BY MOUTH 1-2 TIMES A DAY FOR ANXIETY, 5/10/2017  •  lovastatin, 40 mg, Oral, Nightly, 5/10/2017  •  acetaminophen, 500 mg, Oral, Q6HRS PRN, 5/10/2017  •  lisinopril, 20 mg, Oral, BID, 5/10/2017  •  amlodipine, Take 5 mg by mouth.  •  alprazolam, 1 mg, Oral, HS PRN    Allergies   Allergen Reactions   • Ciprofloxacin Nausea            Review of Systems   Constitutional: Negative.    HENT: Negative.    Eyes: Negative.    Respiratory: Negative.    Cardiovascular: Negative.    Gastrointestinal:  "Negative.    Genitourinary: Negative.    Musculoskeletal: Negative.    Skin:        tear   Neurological: Negative.    Endo/Heme/Allergies: Negative.    Psychiatric/Behavioral: Negative.           Objective:     /56 mmHg  Pulse 95  Temp(Src) 36.2 °C (97.2 °F)  Resp 16  Ht 1.753 m (5' 9\")  Wt 50.803 kg (112 lb)  BMI 16.53 kg/m2  SpO2 70%  Breastfeeding? No     Physical Exam       Nursing note and vital signs reviewed.    Constitutional:  Appropriately groomed, pleasant affect, well nourished, and in no acute distress.    HEENT:  Head: Atraumatic, normocephalic.    Eyes:  EOMs full.  Conjunctivae clear, sclera white, and medial canthus without exudate bilaterally.    Ears:  Hearing grossly intact to voice.    Neck:  FROM.  No anterior cervical chain lymphadenopathy. Thyroid nonpalpable, without masses or nodules. No supraclavicular lymphadenopathy to palpation.    Lungs:  Lungs with normal respiratory excursion and effort.      Muscle skeletal:  Gait and station wnl, non antalgic.    Derm:  Healing skin tear to left elbow region and upper arm with a tablespoon-sized region of granulation tissue with no epidermis present. Cleaned with chlorhexidine and redressed with Xeroform, Telfa, Kerlix, and tube dressing. Overall good turgor pressure.     Psychiatric:  Normal judgement, mood and affect.        Assessment/Plan:     1. Skin tear of left elbow without complication, subsequent encounter  Patient presents with well-healing skin tear to the left aspect of her upper arm posterior aspect with a teaspoon to tablespoon-sized region of missing epithelial layer. Cleaned with chlorhexidine and redressed with Xeroform and Telfa. No evidence of infection at this time. Did advise patient follow up on Saturday for recheck. Discussed wound care with patient and son.    Patient was in agreement with this treatment plan and seemed to understand without barriers. All questions were encouraged and answered.  Reviewed signs " and symptoms of when to seek emergency medical care.     Please note that this dictation was created using voice recognition software.  I have made every reasonable attempt to correct obvious errors, but I expect there are errors of bud and possibly content that I did not discover before finalizing the note.

## 2017-05-13 ENCOUNTER — OFFICE VISIT (OUTPATIENT)
Dept: URGENT CARE | Facility: PHYSICIAN GROUP | Age: 82
End: 2017-05-13
Payer: MEDICARE

## 2017-05-13 VITALS
HEART RATE: 80 BPM | OXYGEN SATURATION: 99 % | TEMPERATURE: 97.3 F | SYSTOLIC BLOOD PRESSURE: 130 MMHG | DIASTOLIC BLOOD PRESSURE: 76 MMHG | WEIGHT: 110 LBS | BODY MASS INDEX: 16.29 KG/M2 | RESPIRATION RATE: 16 BRPM | HEIGHT: 69 IN

## 2017-05-13 DIAGNOSIS — S41.112A SKIN TEAR OF LEFT UPPER ARM WITHOUT COMPLICATION, INITIAL ENCOUNTER: ICD-10-CM

## 2017-05-13 PROCEDURE — 1036F TOBACCO NON-USER: CPT | Performed by: EMERGENCY MEDICINE

## 2017-05-13 PROCEDURE — 4040F PNEUMOC VAC/ADMIN/RCVD: CPT | Performed by: EMERGENCY MEDICINE

## 2017-05-13 PROCEDURE — 1101F PT FALLS ASSESS-DOCD LE1/YR: CPT | Mod: 8P | Performed by: EMERGENCY MEDICINE

## 2017-05-13 PROCEDURE — G8432 DEP SCR NOT DOC, RNG: HCPCS | Performed by: EMERGENCY MEDICINE

## 2017-05-13 PROCEDURE — G8419 CALC BMI OUT NRM PARAM NOF/U: HCPCS | Performed by: EMERGENCY MEDICINE

## 2017-05-13 PROCEDURE — 99213 OFFICE O/P EST LOW 20 MIN: CPT | Performed by: EMERGENCY MEDICINE

## 2017-05-13 ASSESSMENT — ENCOUNTER SYMPTOMS
FEVER: 0
SENSORY CHANGE: 0

## 2017-05-13 NOTE — PATIENT INSTRUCTIONS
Skin Tear Care  A skin tear is when the top layer of skin peels off. To repair the skin, your doctor may use:   · Tape.  · Skin adhesive strips.  HOME CARE  · Change bandages (dressings) once a day or as told by your doctor.  ¨ Gently clean the area with salt (saline) solution or with a mild soap and water.  ¨ Do not rub the injured skin dry. Let the area air dry.  ¨ Put petroleum jelly or antibiotic cream on the tear. Do not allow a scab to form.  ¨ If the bandage sticks, moisten it with warm soapy water and remove it.  · Protect the injured skin until it has healed.  · Only take medicine as told by your doctor.  · Take showers or baths using warm soapy water. Apply a new bandage after the shower or bath.  · Keep all doctor visits as told.  GET HELP RIGHT AWAY IF:   · You have redness, puffiness (swelling), or more pain in the tear.  · You have a yellowish-white fluid (pus) coming from the tear.  · You have chills.  · You have a red streak that goes away from the tear.  · You have a bad smell coming from the tear or bandage.  · You have a fever or lasting symptoms for more than 2-3 days.  · You have a fever and your symptoms suddenly get worse.  MAKE SURE YOU:   · Understand these instructions.  · Will watch this condition.  · Will get help right away if you are not doing well or get worse.     This information is not intended to replace advice given to you by your health care provider. Make sure you discuss any questions you have with your health care provider.     Document Released: 09/26/2009 Document Revised: 09/11/2013 Document Reviewed: 07/01/2013  eWise Interactive Patient Education ©2016 [employer]Wise Inc.

## 2017-05-13 NOTE — PROGRESS NOTES
"Subjective:      Mila Scanlon is a 89 y.o. female who presents with Wound Check            Wound Check  Treated in ED: 10 days ago. Prior ED Treatment: steri strips, dressings. There is no redness present. There is no swelling present. The pain has no pain. She has no difficulty moving the affected extremity or digit.       Review of Systems   Constitutional: Negative for fever and malaise/fatigue.   Musculoskeletal:        No pain proximal or distal to the site.   Skin: Negative for itching.   Neurological: Negative for sensory change.          Objective:     /76 mmHg  Pulse 80  Temp(Src) 36.3 °C (97.3 °F)  Resp 16  Ht 1.753 m (5' 9.02\")  Wt 49.896 kg (110 lb)  BMI 16.24 kg/m2  SpO2 99%     Physical Exam   Constitutional: She appears well-developed and well-nourished. No distress.   Cardiovascular:   Capillary refill is normal.   Musculoskeletal:        Left elbow: Normal.   Lymphadenopathy:   No lymphangitis   Neurological:   Distal sensation to light touch and pressure intact.   Skin:   Healing superficial skin tears left upper arm, posterior lateral. Steri-Strips in place, areas of skin loss with new epithelialization. No evidence of secondary infection.   Psychiatric: She has a normal mood and affect.               Assessment/Plan:     1. Skin tear of left upper arm without complication, initial encounter  Advised dry Telfa pad, roller gauze until wounds dry. Recheck when necessary.        "

## 2017-05-13 NOTE — MR AVS SNAPSHOT
"        Mila Scanlon   2017 9:50 AM   Office Visit   MRN: [id number]    Department:  [facility]   Dept Phone:  [phone]    Description:  Female : 1926   Provider:  Pablo Mitchell M.D.           Reason for Visit     Wound Check skin tear on left elbow and small cut on right arm      Allergies as of 2017     Allergen Noted Reactions    Ciprofloxacin 2015   Nausea      You were diagnosed with     Skin tear of left upper arm without complication, initial encounter   [[id number]]         Vital Signs     Blood Pressure Pulse Temperature Respirations Height Weight    130/76 mmHg 80 36.3 °C (97.3 °F) 16 1.753 m (5' 9.02\") 49.896 kg (110 lb)    Body Mass Index Oxygen Saturation Smoking Status             16.24 kg/m2 99% Former Smoker         Basic Information     Date Of Birth Sex Race Ethnicity Preferred Language    1926 Female White Non- English      Problem List              ICD-10-CM Priority Class Noted - Resolved    Back pain M54.9   2016 - Present    Weakness R53.1   2016 - Present      Health Maintenance        Date Due Completion Dates    PAP SMEAR 1947 ---    MAMMOGRAM 1966 ---    COLONOSCOPY 1976 ---    IMM ZOSTER VACCINE 1986 ---    BONE DENSITY 1991 ---    IMM PNEUMOCOCCAL 65+ (ADULT) LOW/MEDIUM RISK SERIES (2 of 2 - PPSV23) 2017    IMM DTaP/Tdap/Td Vaccine (2 - Td) 5/3/2027 5/3/2017            Current Immunizations     13-VALENT PCV PREVNAR 2016    Influenza Vaccine Adult HD 10/20/2015  9:42 AM    Tdap Vaccine 5/3/2017  4:20 PM      Below and/or attached are the medications your provider expects you to take. Review all of your home medications and newly ordered medications with your provider and/or pharmacist. Follow medication instructions as directed by your provider and/or pharmacist. Please keep your medication list with you and share with your provider. Update the information when medications are " discontinued, doses are changed, or new medications (including over-the-counter products) are added; and carry medication information at all times in the event of emergency situations     Allergies:  CIPROFLOXACIN - Nausea               Medications  Valid as of: May 13, 2017 - 10:26 AM    Generic Name Brand Name Tablet Size Instructions for use    Acetaminophen (Tab) TYLENOL 500 MG Take 500 mg by mouth every 6 hours as needed for Moderate Pain.        ALPRAZolam (Tab) XANAX 1 MG Take 1 mg by mouth at bedtime as needed for Sleep.        ALPRAZolam (Tab) XANAX 0.25 MG TAKE 1 TAB BY MOUTH 1-2 TIMES A DAY FOR ANXIETY        AmLODIPine Besylate (Tab) NORVASC 5 MG Take 5 mg by mouth.        Lisinopril (Tab) PRINIVIL 20 MG Take 20 mg by mouth 2 times a day.        Lovastatin (Tab) MEVACOR 40 MG Take 40 mg by mouth every evening.        .                 Medicines prescribed today were sent to:     [id number] - [place] - [address]    [address] [place] [address]    Phone: [phone] Fax: [phone]    Open 24 Hours?: No      Medication refill instructions:       If your prescription bottle indicates you have medication refills left, it is not necessary to call your provider’s office. Please contact your pharmacy and they will refill your medication.    If your prescription bottle indicates you do not have any refills left, you may request refills at any time through one of the following ways: The online RuffWire system (except Urgent Care), by calling your provider’s office, or by asking your pharmacy to contact your provider’s office with a refill request. Medication refills are processed only during regular business hours and may not be available until the next business day. Your provider may request additional information or to have a follow-up visit with you prior to refilling your medication.   *Please Note: Medication refills are assigned a new Rx number when refilled electronically. Your pharmacy may  indicate that no refills were authorized even though a new prescription for the same medication is available at the pharmacy. Please request the medicine by name with the pharmacy before contacting your provider for a refill.        Instructions    Skin Tear Care  A skin tear is when the top layer of skin peels off. To repair the skin, your doctor may use:   · Tape.  · Skin adhesive strips.  HOME CARE  · Change bandages (dressings) once a day or as told by your doctor.  ¨ Gently clean the area with salt (saline) solution or with a mild soap and water.  ¨ Do not rub the injured skin dry. Let the area air dry.  ¨ Put petroleum jelly or antibiotic cream on the tear. Do not allow a scab to form.  ¨ If the bandage sticks, moisten it with warm soapy water and remove it.  · Protect the injured skin until it has healed.  · Only take medicine as told by your doctor.  · Take showers or baths using warm soapy water. Apply a new bandage after the shower or bath.  · Keep all doctor visits as told.  GET HELP RIGHT AWAY IF:   · You have redness, puffiness (swelling), or more pain in the tear.  · You have a yellowish-white fluid (pus) coming from the tear.  · You have chills.  · You have a red streak that goes away from the tear.  · You have a bad smell coming from the tear or bandage.  · You have a fever or lasting symptoms for more than 2-3 days.  · You have a fever and your symptoms suddenly get worse.  MAKE SURE YOU:   · Understand these instructions.  · Will watch this condition.  · Will get help right away if you are not doing well or get worse.     This information is not intended to replace advice given to you by your health care provider. Make sure you discuss any questions you have with your health care provider.     Document Released: 09/26/2009 Document Revised: 09/11/2013 Document Reviewed: 07/01/2013  Arctrieval Interactive Patient Education ©2016 Elsevier Inc.            [employer] Access Code: [email or web address]  Expires:  6/2/2017  4:54 PM    Baihet  A secure, online tool to manage your health information     [employer]’s ICEdot® is a secure, online tool that connects you to your personalized health information from the privacy of your home -- day or night - making it very easy for you to manage your healthcare. Once the activation process is completed, you can even access your medical information using the ICEdot gagan, which is available for free in the Apple Gagan store or Google Play store.     ICEdot provides the following levels of access (as shown below):   My Chart Features   [facility] Primary Care Doctor [facility]  Specialists [facility]  Urgent  Care Non-[facility]  Primary Care  Doctor   Email your healthcare team securely and privately 24/7 X X X    Manage appointments: schedule your next appointment; view details of past/upcoming appointments X      Request prescription refills. X      View recent personal medical records, including lab and immunizations X X X X   View health record, including health history, allergies, medications X X X X   Read reports about your outpatient visits, procedures, consult and ER notes X X X X   See your discharge summary, which is a recap of your hospital and/or ER visit that includes your diagnosis, lab results, and care plan. X X       How to register for ICEdot:  1. Go to  [email or web address].  2. Click on the Sign Up Now box, which takes you to the New Member Sign Up page. You will need to provide the following information:  a. Enter your ICEdot Access Code exactly as it appears at the top of this page. (You will not need to use this code after you’ve completed the sign-up process. If you do not sign up before the expiration date, you must request a new code.)   b. Enter your date of birth.   c. Enter your home email address.   d. Click Submit, and follow the next screen’s instructions.  3. Create a ICEdot ID. This will be your ICEdot login ID and cannot be changed, so think of one  that is secure and easy to remember.  4. Create a Braingaze password. You can change your password at any time.  5. Enter your Password Reset Question and Answer. This can be used at a later time if you forget your password.   6. Enter your e-mail address. This allows you to receive e-mail notifications when new information is available in Braingaze.  7. Click Sign Up. You can now view your health information.    For assistance activating your Braingaze account, call [phone]

## 2017-06-17 ENCOUNTER — HOSPITAL ENCOUNTER (EMERGENCY)
Facility: MEDICAL CENTER | Age: 82
End: 2017-06-17
Attending: EMERGENCY MEDICINE
Payer: MEDICARE

## 2017-06-17 VITALS
TEMPERATURE: 99.5 F | RESPIRATION RATE: 23 BRPM | HEIGHT: 65 IN | OXYGEN SATURATION: 94 % | HEART RATE: 103 BPM | BODY MASS INDEX: 18.99 KG/M2 | SYSTOLIC BLOOD PRESSURE: 118 MMHG | DIASTOLIC BLOOD PRESSURE: 49 MMHG | WEIGHT: 114 LBS

## 2017-06-17 DIAGNOSIS — S41.112A SKIN TEAR OF LEFT UPPER ARM WITHOUT COMPLICATION, INITIAL ENCOUNTER: ICD-10-CM
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
## 2017-06-17 DIAGNOSIS — S81.811A SKIN TEAR OF LOWER LEG WITHOUT COMPLICATION, RIGHT, INITIAL ENCOUNTER: ICD-10-CM

## 2017-06-17 PROCEDURE — 96361 HYDRATE IV INFUSION ADD-ON: CPT

## 2017-06-17 PROCEDURE — 99285 EMERGENCY DEPT VISIT HI MDM: CPT

## 2017-06-17 PROCEDURE — A9270 NON-COVERED ITEM OR SERVICE: HCPCS | Performed by: EMERGENCY MEDICINE

## 2017-06-17 PROCEDURE — 304217 HCHG IRRIGATION SYSTEM

## 2017-06-17 PROCEDURE — 96360 HYDRATION IV INFUSION INIT: CPT

## 2017-06-17 PROCEDURE — 700102 HCHG RX REV CODE 250 W/ 637 OVERRIDE(OP): Performed by: EMERGENCY MEDICINE

## 2017-06-17 PROCEDURE — 700105 HCHG RX REV CODE 258: Performed by: EMERGENCY MEDICINE

## 2017-06-17 RX ORDER — OXYCODONE AND ACETAMINOPHEN 7.5; 325 MG/1; MG/1
1 TABLET ORAL ONCE
Status: COMPLETED | OUTPATIENT
Start: 2017-06-17 | End: 2017-06-17

## 2017-06-17 RX ORDER — SODIUM CHLORIDE 9 MG/ML
500 INJECTION, SOLUTION INTRAVENOUS ONCE
Status: COMPLETED | OUTPATIENT
Start: 2017-06-17 | End: 2017-06-17

## 2017-06-17 RX ADMIN — SODIUM CHLORIDE 500 ML: 9 INJECTION, SOLUTION INTRAVENOUS at 20:03

## 2017-06-17 RX ADMIN — OXYCODONE AND ACETAMINOPHEN 1 TABLET: 7.5; 325 TABLET ORAL at 20:48

## 2017-06-17 ASSESSMENT — LIFESTYLE VARIABLES: DO YOU DRINK ALCOHOL: NO

## 2017-06-17 ASSESSMENT — PAIN SCALES - GENERAL
PAINLEVEL_OUTOF10: 5
PAINLEVEL_OUTOF10: 4

## 2017-06-17 NOTE — ED AVS SNAPSHOT
6/17/2017    Mila Scanlon  [address]  [facility] [place] [facility] [address]    Dear Mila:    [facility] wants to ensure your discharge home is safe and you or your loved ones have had all of your questions answered regarding your care after you leave the hospital.    Below is a list of resources and contact information should you have any questions regarding your hospital stay, follow-up instructions, or active medical symptoms.    Questions or Concerns Regarding… Contact   Medical Questions Related to Your Discharge  (7 days a week, 8am-5pm) Contact a Nurse Care Coordinator   [phone]   Medical Questions Not Related to Your Discharge  (24 hours a day / 7 days a week)  Contact the Nurse Health Line   [phone]    Medications or Discharge Instructions Refer to your discharge packet   or contact your [facility] Primary Care Provider   [phone]   Follow-up Appointment(s) Schedule your appointment via CrimeReports   or contact Scheduling [phone]   Billing Review your statement via CrimeReports  or contact Billing [phone]   Medical Records Review your records via CrimeReports   or contact Medical Records [phone]     You may receive a telephone call within two days of discharge. This call is to make certain you understand your discharge instructions and have the opportunity to have any questions answered. You can also easily access your medical information, test results and upcoming appointments via the CrimeReports free online health management tool. You can learn more and sign up at [email or web address]/CrimeReports. For assistance setting up your CrimeReports account, please call [phone].    Once again, we want to ensure your discharge home is safe and that you have a clear understanding of any next steps in your care. If you have any questions or concerns, please do not hesitate to contact us, we are here for you. Thank you for choosing [facility] for your healthcare needs.    Sincerely,    Your [facility] Healthcare Team

## 2017-06-17 NOTE — ED AVS SNAPSHOT
Home Care Instructions                                                                                                                Mila Scanlon   MRN: [id number]    Department:  [facility], Emergency Dept   Date of Visit:  6/17/2017            [facility], Emergency Dept    [address] [facility] [address]    Phone:  [phone]      You were seen by     Paramjit Liang M.D.      Your Diagnosis Was     Fall, initial encounter     W19.XXXA       These are the medications you received during your hospitalization from 06/17/2017 1915 to 06/17/2017 2139     Date/Time Order Dose Route Action    06/17/2017 2003 NS infusion 500 mL 500 mL Intravenous New Bag    06/17/2017 2048 oxycodone-acetaminophen (PERCOCET) 7.5-325 MG per tablet 1 Tab 1 Tab Oral Given      Follow-up Information     1. Follow up with Umberto Solis M.D. In 2 days.    Specialty:  Family Medicine    Contact information    [facility] [address]  [address]  [facility] [address]  [phone]          2. Follow up with [facility], Emergency Dept.    Specialty:  Emergency Medicine    Why:  As needed, If symptoms worsen    Contact information    [address] [place] [address] [phone]      Medication Information     Review all of your home medications and newly ordered medications with your primary doctor and/or pharmacist as soon as possible. Follow medication instructions as directed by your doctor and/or pharmacist.     Please keep your complete medication list with you and share with your physician. Update the information when medications are discontinued, doses are changed, or new medications (including over-the-counter products) are added; and carry medication information at all times in the event of emergency situations.               Medication List      ASK your doctor about these medications        Instructions    Morning Afternoon Evening Bedtime    acetaminophen 500 MG Tabs      Commonly known as:  TYLENOL        Take 500 mg by mouth every 6 hours as needed for Moderate Pain.   Dose:  500 mg                        * alprazolam 1 MG Tabs   Commonly known as:  XANAX        Take 1 mg by mouth at bedtime as needed for Sleep.   Dose:  1 mg                        * alprazolam 0.25 MG Tabs   Commonly known as:  XANAX        TAKE 1 TAB BY MOUTH 1-2 TIMES A DAY FOR ANXIETY                        amlodipine 5 MG Tabs   Commonly known as:  NORVASC        Take 5 mg by mouth.   Dose:  5 mg                        lisinopril 20 MG Tabs   Commonly known as:  PRINIVIL        Take 20 mg by mouth 2 times a day.   Dose:  20 mg                        lovastatin 40 MG tablet   Commonly known as:  MEVACOR        Take 40 mg by mouth every evening.   Dose:  40 mg                        * Notice:  This list has 2 medication(s) that are the same as other medications prescribed for you. Read the directions carefully, and ask your doctor or other care provider to review them with you.            Procedures and tests performed during your visit     APPLY STERI STRIPS    PO CHALLENGE    VITALS - NOTIFY MD    WOUND IRRIGATION            Patient Information     Patient Information    Following emergency treatment: all patient requiring follow-up care must return either to a private physician or a clinic if your condition worsens before you are able to obtain further medical attention, please return to the emergency room.     Billing Information    At [facility], we work to make the billing process streamlined for our patients.  Our Representatives are here to answer any questions you may have regarding your hospital bill.  If you have insurance coverage and have supplied your insurance information to us, we will submit a claim to your insurer on your behalf.  Should you have any questions regarding your bill, we can be reached online or by phone as follows:  Online: You are able pay your bills online or live chat with  our representatives about any billing questions you may have. We are here to help Monday - Friday from 8:00am to 7:30pm and 9:00am - 12:00pm on Saturdays.  Please visit [email or web address]  for more information.   Phone:  [phone] or [phone]    Please note that your emergency physician, surgeon, pathologist, radiologist, anesthesiologist, and other specialists are not employed by [facility] and will therefore bill separately for their services.  Please contact them directly for any questions concerning their bills at the numbers below:     Emergency Physician Services:  [phone]  [facility]:  [phone]  [facility]:  [phone]  [facility]:  [phone]    1. Your final bill may vary from the amount quoted upon discharge if all procedures are not complete at that time, or if your doctor has additional procedures of which we are not aware. You will receive an additional bill if you return to the Emergency Department at [facility] for suture removal regardless of the facility of which the sutures were placed.     2. Please arrange for settlement of this account at the emergency registration.    3. All self-pay accounts are due in full at the time of treatment.  If you are unable to meet this obligation then payment is expected within 4-5 days.     4. If you have had radiology studies (CT, X-ray, Ultrasound, MRI), you have received a preliminary result during your emergency department visit. Please contact the radiology department [phone] to receive a copy of your final result. Please discuss the Final result with your primary physician or with the follow up physician provided.     Crisis Hotline:  [place] Crisis Hotline:  7-387-EJJRSUP or 1-124.221.1687  [place] Crisis Hotline:    1-189.877.5190 or 196-735-4914         ED Discharge Follow Up Questions    1. In order to provide you with very good care, we  would like to follow up with a phone call in the next few days.  May we have your permission to contact you?     YES /  NO    2. What is the best phone number to call you? (       )_____-__________    3. What is the best time to call you?      Morning  /  Afternoon  /  Evening                   Patient Signature:  ____________________________________________________________    Date:  ____________________________________________________________

## 2017-06-17 NOTE — ED AVS SNAPSHOT
LifeIMAGE Access Code: [email or web address]  Expires: 6/30/2017  4:38 AM    LifeIMAGE  A secure, online tool to manage your health information     [email or web address]’s LifeIMAGE® is a secure, online tool that connects you to your personalized health information from the privacy of your home -- day or night - making it very easy for you to manage your healthcare. Once the activation process is completed, you can even access your medical information using the LifeIMAGE gagan, which is available for free in the Apple Gagan store or Google Play store.     LifeIMAGE provides the following levels of access (as shown below):   My Chart Features   [facility] Primary Care Doctor [facility]  Specialists [facility]  Urgent  Care Non-[facility]  Primary Care  Doctor   Email your healthcare team securely and privately 24/7 X X X X   Manage appointments: schedule your next appointment; view details of past/upcoming appointments X      Request prescription refills. X      View recent personal medical records, including lab and immunizations X X X X   View health record, including health history, allergies, medications X X X X   Read reports about your outpatient visits, procedures, consult and ER notes X X X X   See your discharge summary, which is a recap of your hospital and/or ER visit that includes your diagnosis, lab results, and care plan. X X       How to register for LifeIMAGE:  1. Go to  [email or web address].  2. Click on the Sign Up Now box, which takes you to the New Member Sign Up page. You will need to provide the following information:  a. Enter your LifeIMAGE Access Code exactly as it appears at the top of this page. (You will not need to use this code after you’ve completed the sign-up process. If you do not sign up before the expiration date, you must request a new code.)   b. Enter your date of birth.   c. Enter your home email address.   d. Click Submit, and follow the next screen’s instructions.  3. Create a LifeIMAGE ID. This will be your LifeIMAGE  login ID and cannot be changed, so think of one that is secure and easy to remember.  4. Create a TheTakes password. You can change your password at any time.  5. Enter your Password Reset Question and Answer. This can be used at a later time if you forget your password.   6. Enter your e-mail address. This allows you to receive e-mail notifications when new information is available in TheTakes.  7. Click Sign Up. You can now view your health information.    For assistance activating your TheTakes account, call [phone]

## 2017-06-18 NOTE — ED PROVIDER NOTES
CHIEF COMPLAINT  Chief Complaint   Patient presents with   • GLF       HPI  Mila Scanlon is a 89 y.o. female who presents after mechanical ground-level fall earlier today after trying to get out of her car. She reports that she fell into a hole and was unable to get herself up. She reports that she could not get out and was struggling and out in the sun for approximately 30 minutes until a bystander saw her and helped her contacted EMS. She denies any injuries at this time however. Denies any head trauma. No head or neck pain. No chest pain or shortness of breath. No abdominal pain, nausea, vomiting. Has chronic back pain. Denies any significant worsening of her back pain compared to usual. Has multiple skin tears to her upper and lower extremities however denies any numbness or weakness or bony deformity or different pain. Up-to-date with tetanus.    REVIEW OF SYSTEMS  See HPI for further details. All other systems are negative.     PAST MEDICAL HISTORY   has a past medical history of Hypertension; High cholesterol; IBS (irritable bowel syndrome); Anxiety; Back pain; Cataract; Arthritis; Skin cancer (2013); Carotid artery obstruction (2008); Anxiety; and Kidney stones (2008).    SOCIAL HISTORY  Social History     Social History Main Topics   • Smoking status: Former Smoker     Quit date: 10/01/2015   • Smokeless tobacco: Not on file   • Alcohol Use: No   • Drug Use: No   • Sexual Activity: Not Currently       SURGICAL HISTORY   has past surgical history that includes hysterectomy laparoscopy.    CURRENT MEDICATIONS  Home Medications     Reviewed by Michelle Larsen R.N. (Registered Nurse) on 06/17/17 at 1927  Med List Status: Unable to Obtain    Medication Last Dose Status    acetaminophen (TYLENOL) 500 MG Tab 5/10/2017 Active    alprazolam (XANAX) 0.25 MG Tab 5/10/2017 Active    alprazolam (XANAX) 1 MG TABS  Active    amlodipine (NORVASC) 5 MG Tab  Active    lisinopril (PRINIVIL) 20 MG TABS 5/10/2017 Active     "lovastatin (MEVACOR) 40 MG tablet 5/10/2017 Active                ALLERGIES  Allergies   Allergen Reactions   • Ciprofloxacin Nausea       PHYSICAL EXAM  VITAL SIGNS: /49 mmHg  Pulse 118  Temp(Src) 37.5 °C (99.5 °F)  Resp 16  Ht 1.651 m (5' 5\")  Wt 51.71 kg (114 lb)  BMI 18.97 kg/m2  SpO2 92%  Pulse ox interpretation: I interpret this pulse ox as normal.  Constitutional: Alert in no apparent distress.  HENT: No signs of trauma, Bilateral external ears normal, Nose normal.  No signs of basilar skull fracture  Eyes: Pupils are equal and reactive, Conjunctiva normal, Non-icteric.   Neck: Normal range of motion, No tenderness, Supple, No stridor.   Cardiovascular: Tachycardic rate and regular rhythm  Thorax & Lungs: Normal breath sounds, No respiratory distress, No wheezing, No chest tenderness.   Abdomen: Bowel sounds normal, Soft, No tenderness, No masses, No pulsatile masses. No peritoneal signs.  Skin: Warm, Dry, No erythema, No rash.  Multiple skin tears involving her left upper extremity and her right lower extremity.  Back: No bony tenderness, No CVA tenderness.   Extremities: Intact distal pulses, No edema, No tenderness, No cyanosis  Musculoskeletal: Good range of motion in all major joints. No tenderness to palpation or major deformities noted.   Neurologic: Alert , Normal motor function and gait, Normal sensory function, No focal deficits noted.   Psychiatric: Affect normal, Judgment normal, Mood normal.       DIAGNOSTIC STUDIES / PROCEDURES      LABS  Labs Reviewed - No data to display    RADIOLOGY  No orders to display     Laceration Repair Procedure Note    Indication: Skin tear    Procedure: The patient was placed in the appropriate position. The area was then irrigated with normal saline. The lacerations were closed with steri strips. There were multiple lacerations involving the left upper extremity and the right lower extremity on the shin. Covering approximately 40 cm² area. The wounds " were then dressed with a bandage / gauze.     Total repaired wound length: 40 cm²    Other Items: None    The patient tolerated the procedure well.    Complications: None          COURSE & MEDICAL DECISION MAKING  Pertinent Labs & Imaging studies reviewed. (See chart for details)  89 y.o. female presenting after murmur chemical ground level fall. She reports falling into a ditch and being unable to get herself out and as a result was out in the direct sun for approximately half an hour. Bystanders helped the patient out and contacted EMS given that she has had multiple skin tears on her upper and lower extremities. The patient does not have any bony deformities. Was ambulatory prior to arrival. No chest pain or shortness breath. Denies any head or neck or back pain. No syncope or loss of consciousness. No signs of trauma to the head. No nausea or vomiting or abdominal pain. So he tachycardic upon arrival. May be secondary to dehydration or heat exposure. Was given IV fluid and oral fluid hydration. The patient reports improved overall.    Multiple wounds were irrigated and then closed/dressed with Steri-Strips. She had multiple skin tears. She is not on any blood thinners. Would likely not respond well to laceration repair using staples or sutures given the fragile nature of the patient's skin. No active bleeding. Patient reports feeling improved and is in stable condition. Denies any chest pain, shortness breath, headache. Appears stable for discharge at this time.    The patient will return for worsening symptoms or failure of improvement and is stable at the time of discharge. The patient verbalizes understanding in their own words.    Umberto Solis M.D.  [address]  [id number]  [address]  [facility] [address]  [phone]    In 2 days      [facility], Emergency Dept  [address] [facility] [address] [phone]    As needed, If symptoms worsen      FINAL IMPRESSION  1. Fall, initial  encounter    2. Skin tear of lower leg without complication, right, initial encounter    3. Skin tear of left upper arm without complication, initial encounter            Electronically signed by: Paramjit Liang, 6/17/2017 7:28 PM

## 2017-06-18 NOTE — DISCHARGE PLANNING
Received call from pts RN, family would like resources for assisted living.     Printed list, met with pts dtr Mila and son-in-law outside room. They asked that we leave the list with them as pt is very emotional. Pts dtr shared that pts spouse passed after 7 months ago. Prior to pts souse passing they had set them up to live at [facility]. Pt does not want to live at [facility], they want to discuss AL with pt after d/c. Family are aware of costs associated with AL, this is not an issue for pt.    Met with pt at bedside. Pt stated she needs to not drive anymore. Pt was tearful, does not want to live alone anymore. Pt stated she misses her , asked pt about counseling and support groups for grief. Pt states she has a counselor. Pts dtr will likely stay with pt tonight in her apartment as pt does not want to be alone. Pt states her anniversary is tomorrow. Pt was sad talking about all her elderly family members who are demented and sick. Pt stated she felt worthless no longer being able to drive, discussed the support and changes being difficult. Pt smiled when she talked about her 11 grandchildren. SW provided emotional support. Pt has a lot a of family with a good plan for pts d/c.

## 2017-06-18 NOTE — ED NOTES
Pt was at [facility], had GLF, was on ground for 30min in the sun, denies hitting head or having loc, remembers incident,states getting out of car and stepped in a hole.  Pt has multiple areas of skin tears to left wrist, arm, and avulsion to right shin area.  Bystanders helped pt stand up and was able to walk.  Pt tearful during triage states multiple falls recently after losing  and family wants to put her in an assisted living.  Pt is worried she can;t go back home.  Pt has areas of bruising and per pt it is due to all the falls and was seen at urgent care for those.  Noted slight sun burn to back, neck, and face of patient. Pt was able to move off EMS gurney but does state pain to the areas of skin tears.

## 2017-06-18 NOTE — ED NOTES
Pt ambulatory with steady gait, pt verbalized understanding of poc and discharge , no questions at this time.  Family at bedside,  VSS

## 2017-06-18 NOTE — ED NOTES
Pt was at [facility], had GLF, was on ground for 30min in the sun, denies hitting head or having loc, remembers incident,states getting out of car and stepped in a hole.  Pt has multiple areas of skin tears to left wrist, arm, and avulsion to right shin area.  Bystanders helped pt stand up and was able to walk.  Pt tearful during triage states multiple falls recently after losing  and family wants to put her in an assisted living.  Pt is worried she can;t go back home.  Pt has areas of bruising and per pt it is due to all the falls and was seen at urgent care for those.  Noted slight sun burn to back, neck, and face of patient. Pt was able to move off EMS gurney but does state pain to the areas of skin tears.

## 2017-06-22 ENCOUNTER — HOSPITAL ENCOUNTER (OUTPATIENT)
Dept: LAB | Facility: MEDICAL CENTER | Age: 82
End: 2017-06-22
Attending: FAMILY MEDICINE
Payer: MEDICARE

## 2017-06-22 LAB
ANION GAP SERPL CALC-SCNC: 11 MMOL/L (ref 0–11.9)
BASOPHILS # BLD AUTO: 0.5 % (ref 0–1.8)
BASOPHILS # BLD: 0.04 K/UL (ref 0–0.12)
BUN SERPL-MCNC: 40 MG/DL (ref 8–22)
CALCIUM SERPL-MCNC: 9.4 MG/DL (ref 8.5–10.5)
CHLORIDE SERPL-SCNC: 113 MMOL/L (ref 96–112)
CHOLEST SERPL-MCNC: 192 MG/DL (ref 100–199)
CO2 SERPL-SCNC: 20 MMOL/L (ref 20–33)
CREAT SERPL-MCNC: 1.39 MG/DL (ref 0.5–1.4)
EOSINOPHIL # BLD AUTO: 0.08 K/UL (ref 0–0.51)
EOSINOPHIL NFR BLD: 1.1 % (ref 0–6.9)
ERYTHROCYTE [DISTWIDTH] IN BLOOD BY AUTOMATED COUNT: 59.2 FL (ref 35.9–50)
GFR SERPL CREATININE-BSD FRML MDRD: 36 ML/MIN/1.73 M 2
GLUCOSE SERPL-MCNC: 98 MG/DL (ref 65–99)
HCT VFR BLD AUTO: 34.8 % (ref 37–47)
HDLC SERPL-MCNC: 84 MG/DL
HGB BLD-MCNC: 10.5 G/DL (ref 12–16)
IMM GRANULOCYTES # BLD AUTO: 0.02 K/UL (ref 0–0.11)
IMM GRANULOCYTES NFR BLD AUTO: 0.3 % (ref 0–0.9)
LDLC SERPL CALC-MCNC: 86 MG/DL
LYMPHOCYTES # BLD AUTO: 1.08 K/UL (ref 1–4.8)
LYMPHOCYTES NFR BLD: 14.5 % (ref 22–41)
MAGNESIUM SERPL-MCNC: 1.9 MG/DL (ref 1.5–2.5)
MCH RBC QN AUTO: 27.5 PG (ref 27–33)
MCHC RBC AUTO-ENTMCNC: 30.2 G/DL (ref 33.6–35)
MCV RBC AUTO: 91.1 FL (ref 81.4–97.8)
MONOCYTES # BLD AUTO: 0.75 K/UL (ref 0–0.85)
MONOCYTES NFR BLD AUTO: 10.1 % (ref 0–13.4)
NEUTROPHILS # BLD AUTO: 5.49 K/UL (ref 2–7.15)
NEUTROPHILS NFR BLD: 73.5 % (ref 44–72)
NRBC # BLD AUTO: 0 K/UL
NRBC BLD AUTO-RTO: 0 /100 WBC
PLATELET # BLD AUTO: 232 K/UL (ref 164–446)
PMV BLD AUTO: 12.7 FL (ref 9–12.9)
POTASSIUM SERPL-SCNC: 4.5 MMOL/L (ref 3.6–5.5)
RBC # BLD AUTO: 3.82 M/UL (ref 4.2–5.4)
SODIUM SERPL-SCNC: 144 MMOL/L (ref 135–145)
TRIGL SERPL-MCNC: 111 MG/DL (ref 0–149)
WBC # BLD AUTO: 7.5 K/UL (ref 4.8–10.8)

## 2017-06-22 PROCEDURE — 85025 COMPLETE CBC W/AUTO DIFF WBC: CPT

## 2017-06-22 PROCEDURE — 80061 LIPID PANEL: CPT

## 2017-06-22 PROCEDURE — 36415 COLL VENOUS BLD VENIPUNCTURE: CPT

## 2017-06-22 PROCEDURE — 83735 ASSAY OF MAGNESIUM: CPT

## 2017-06-22 PROCEDURE — 80048 BASIC METABOLIC PNL TOTAL CA: CPT

## 2017-06-23 ENCOUNTER — HOME HEALTH ADMISSION (OUTPATIENT)
Dept: HOME HEALTH SERVICES | Facility: HOME HEALTHCARE | Age: 82
End: 2017-06-23
Payer: MEDICARE

## 2017-06-25 ENCOUNTER — OFFICE VISIT (OUTPATIENT)
Dept: URGENT CARE | Facility: PHYSICIAN GROUP | Age: 82
End: 2017-06-25
Payer: MEDICARE

## 2017-06-25 ENCOUNTER — HOME CARE VISIT (OUTPATIENT)
Dept: HOME HEALTH SERVICES | Facility: HOME HEALTHCARE | Age: 82
End: 2017-06-25

## 2017-06-25 VITALS
OXYGEN SATURATION: 95 % | TEMPERATURE: 97.7 F | WEIGHT: 114 LBS | BODY MASS INDEX: 19.46 KG/M2 | HEART RATE: 74 BPM | SYSTOLIC BLOOD PRESSURE: 112 MMHG | DIASTOLIC BLOOD PRESSURE: 72 MMHG | HEIGHT: 64 IN | RESPIRATION RATE: 18 BRPM
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
## 2017-06-25 DIAGNOSIS — L03.115 CELLULITIS OF RIGHT LOWER EXTREMITY: ICD-10-CM

## 2017-06-25 DIAGNOSIS — T14.8XXA HEMATOMA: ICD-10-CM

## 2017-06-25 PROCEDURE — 99214 OFFICE O/P EST MOD 30 MIN: CPT | Performed by: PHYSICIAN ASSISTANT

## 2017-06-25 RX ORDER — CEPHALEXIN 250 MG/1
250 CAPSULE ORAL 3 TIMES DAILY
Qty: 21 CAP | Refills: 0 | Status: SHIPPED | OUTPATIENT
Start: 2017-06-25 | End: 2017-07-02

## 2017-06-25 RX ORDER — CEFTRIAXONE SODIUM 250 MG/1
250 INJECTION, POWDER, FOR SOLUTION INTRAMUSCULAR; INTRAVENOUS ONCE
Status: COMPLETED | OUTPATIENT
Start: 2017-06-25 | End: 2017-06-25

## 2017-06-25 RX ADMIN — CEFTRIAXONE SODIUM 250 MG: 250 INJECTION, POWDER, FOR SOLUTION INTRAMUSCULAR; INTRAVENOUS at 14:23

## 2017-06-25 ASSESSMENT — ENCOUNTER SYMPTOMS
PALPITATIONS: 0
LEG PAIN: 1
SHORTNESS OF BREATH: 0
SENSORY CHANGE: 0
TINGLING: 0
VOMITING: 0
NAUSEA: 0
CHILLS: 0
FOCAL WEAKNESS: 0
FEVER: 0

## 2017-06-25 NOTE — PROGRESS NOTES
Subjective:      Mila Scanlon is a 89 y.o. female who presents with Leg Pain            Leg Pain  Pertinent negatives include no chest pain, chills, fever, nausea or vomiting.    patient did have a fall in the home one week ago and was evaluated through the emergency department. She did have extensive abrasions and skin tears to right lower extremity. These were repaired with largely with Steri-Strips. She did see her primary care doctor on Wednesday of this last week. At that time she was healing well, without concern. Last night she complained of increased pain to right leg and today her daughter brings her to clinic for further evaluation after home health nurse recommended such. They plan to contact primary care tomorrow for additional evaluation this week, but wondered if antibiotic therapy was warranted over the interim. She complains of increased redness, swelling and discomfort to bilateral lower extremities, right greater than left, with recent healing hematoma, skin tears, abrasions. She denies feeling fevers, chills, nausea, vomiting. She complains of increased fatigue over the last 24 hours.    Review of Systems   Constitutional: Positive for malaise/fatigue. Negative for fever and chills.   Respiratory: Negative for shortness of breath.    Cardiovascular: Positive for leg swelling ( bilat R>L). Negative for chest pain and palpitations.   Gastrointestinal: Negative for nausea and vomiting.   Skin:        Positive for swelling, redness surrounding abrasions and skin tears right lower extremity   Neurological: Negative for tingling, sensory change and focal weakness.       PMH:  has a past medical history of Hypertension; High cholesterol; IBS (irritable bowel syndrome); Anxiety; Back pain; Cataract; Arthritis; Skin cancer (2013); Carotid artery obstruction (2008); Anxiety; and Kidney stones (2008). She also has no past medical history of Cancer (CMS-HCC), Bronchitis, Blood clotting disorder (CMS-HCC),  "Hemorrhagic disorder (CMS-HCC), At risk for sleep apnea, Asthma, Anginal syndrome (CMS-HCC), Congestive heart failure (CMS-HCC), COPD (chronic obstructive pulmonary disease) (CMS-HCC), Diabetes (CMS-HCC), Dialysis patient (CMS-HCC), Glaucoma, Gynecological disorder, Myocardial infarct (CMS-HCC), Heart valve disease, Fall, Infectious disease, Indigestion, Arrhythmia, Jaundice, Heart murmur, Pacemaker, Pneumonia, Psychiatric problem, Rheumatic fever, Seizure (CMS-HCC), Stroke (CMS-HCC), Disorder of thyroid, or Urinary incontinence.  MEDS:   Current outpatient prescriptions:   •  alprazolam (XANAX) 0.25 MG Tab, TAKE 1 TAB BY MOUTH 1-2 TIMES A DAY FOR ANXIETY, Disp: , Rfl: 3  •  lovastatin (MEVACOR) 40 MG tablet, Take 40 mg by mouth every evening., Disp: , Rfl:   •  acetaminophen (TYLENOL) 500 MG Tab, Take 500 mg by mouth every 6 hours as needed for Moderate Pain., Disp: , Rfl:   •  lisinopril (PRINIVIL) 20 MG TABS, Take 20 mg by mouth 2 times a day., Disp: , Rfl:   •  amlodipine (NORVASC) 5 MG Tab, Take 5 mg by mouth., Disp: , Rfl: 3  •  alprazolam (XANAX) 1 MG TABS, Take 1 mg by mouth at bedtime as needed for Sleep., Disp: , Rfl:   ALLERGIES:   Allergies   Allergen Reactions   • Ciprofloxacin Nausea     SURGHX:   Past Surgical History   Procedure Laterality Date   • Hysterectomy laparoscopy       SOCHX:  reports that she quit smoking about 20 months ago. She does not have any smokeless tobacco history on file. She reports that she does not drink alcohol or use illicit drugs.  FH: Family history was reviewed, no pertinent findings to report    I have worn a mask for the entire encounter with this patient.      Objective:     /72 mmHg  Pulse 74  Temp(Src) 36.5 °C (97.7 °F)  Resp 18  Ht 1.626 m (5' 4.02\")  Wt 51.71 kg (114 lb)  BMI 19.56 kg/m2  SpO2 95%     Physical Exam   Constitutional: She is oriented to person, place, and time. She appears well-developed and well-nourished. No distress.   HENT:   Head: " Normocephalic and atraumatic.   Right Ear: External ear normal.   Left Ear: External ear normal.   Nose: Nose normal.   Eyes: Conjunctivae and lids are normal. Right eye exhibits no discharge. Left eye exhibits no discharge. No scleral icterus.   Neck: Neck supple.   Pulmonary/Chest: Effort normal. No respiratory distress.   Musculoskeletal: Normal range of motion.        Legs:  Right lower extremity with 3 areas of skin tear repaired with Steri-Strips, progression of erythema extending up the right calf, proximal to the   Neurological: She is alert and oriented to person, place, and time. She is not disoriented.   Skin: Skin is warm and dry. She is not diaphoretic. No erythema. No pallor.   Psychiatric: Her speech is normal and behavior is normal.   Nursing note and vitals reviewed.        Estimated Creatinine Clearance: 22 mL/min (by C-G formula based on Cr of 1.39).    Rocephin 1g - pt tolerates well     Assessment/Plan:     1. Cellulitis of right lower extremity  Supportive care is reviewed with patient/caregiver - recommend to push PO fluids and electrolytes, I reviewed with patient and her daughter, we must trend improvement of redness and swelling to right lower extremity over the next 24 hours, they should follow up with the primary care doctor tomorrow to make an appointment for later in the week. Lines been drawn on patient's right lower leg to demarcate area of erythema, if erythema extends beyond these marks pt is to go to the emergency department    ER precautions with any worsening symptoms are reviewed with patient/caregiver and they do express understanding      - cefTRIAXone (ROCEPHIN) injection 250 mg; 250 mg by Intramuscular route Once.  - cephALEXin (KEFLEX) 250 MG Cap; Take 1 Cap by mouth 3 times a day for 7 days.  Dispense: 21 Cap; Refill: 0 (RENAL DOSING)    2. Fall, initial encounter      3. Hematoma

## 2017-06-25 NOTE — MR AVS SNAPSHOT
"        Mila Scanlon   2017 1:55 PM   Office Visit   MRN: [id number]    Department:  [facility]   Dept Phone:  [phone]    Description:  Female : 1926   Provider:  Manuel Snyder PA-C           Reason for Visit     Leg Pain C/o fall 1 wk ago and was seen at the ER for it.  Was seen by a home nurse today and recommended being seen at the ER or UC due to possible infection.  Bilateral edema      Allergies as of 2017     Allergen Noted Reactions    Ciprofloxacin 2015   Nausea      You were diagnosed with     Cellulitis of right lower extremity   [793950]       Fall, initial encounter   [037417]       Hematoma   [[id number]]         Vital Signs     Blood Pressure Pulse Temperature Respirations Height Weight    112/72 mmHg 74 36.5 °C (97.7 °F) 18 1.626 m (5' 4.02\") 51.71 kg (114 lb)    Body Mass Index Oxygen Saturation Smoking Status             19.56 kg/m2 95% Former Smoker         Basic Information     Date Of Birth Sex Race Ethnicity Preferred Language    1926 Female White Non- English      Problem List              ICD-10-CM Priority Class Noted - Resolved    Back pain M54.9   2016 - Present    Weakness R53.1   2016 - Present      Health Maintenance        Date Due Completion Dates    PAP SMEAR 1947 ---    MAMMOGRAM 1966 ---    COLONOSCOPY 1976 ---    IMM ZOSTER VACCINE 1986 ---    BONE DENSITY 1991 ---    IMM PNEUMOCOCCAL 65+ (ADULT) LOW/MEDIUM RISK SERIES (2 of 2 - PPSV23) 2017    IMM DTaP/Tdap/Td Vaccine (2 - Td) 5/3/2027 5/3/2017            Current Immunizations     13-VALENT PCV PREVNAR 2016    Influenza Vaccine Adult HD 10/20/2015  9:42 AM    Tdap Vaccine 5/3/2017  4:20 PM      Below and/or attached are the medications your provider expects you to take. Review all of your home medications and newly ordered medications with your provider and/or pharmacist. Follow medication instructions as directed by your " provider and/or pharmacist. Please keep your medication list with you and share with your provider. Update the information when medications are discontinued, doses are changed, or new medications (including over-the-counter products) are added; and carry medication information at all times in the event of emergency situations     Allergies:  CIPROFLOXACIN - Nausea               Medications  Valid as of: June 25, 2017 -  2:38 PM    Generic Name Brand Name Tablet Size Instructions for use    Acetaminophen (Tab) TYLENOL 500 MG Take 500 mg by mouth every 6 hours as needed for Moderate Pain.        ALPRAZolam (Tab) XANAX 1 MG Take 1 mg by mouth at bedtime as needed for Sleep.        ALPRAZolam (Tab) XANAX 0.25 MG TAKE 1 TAB BY MOUTH 1-2 TIMES A DAY FOR ANXIETY        AmLODIPine Besylate (Tab) NORVASC 5 MG Take 5 mg by mouth.        Cephalexin (Cap) KEFLEX 250 MG Take 1 Cap by mouth 3 times a day for 7 days.        Lisinopril (Tab) PRINIVIL 20 MG Take 20 mg by mouth 2 times a day.        Lovastatin (Tab) MEVACOR 40 MG Take 40 mg by mouth every evening.        .                 Medicines prescribed today were sent to:     [id number] - [place] - [address] [place] [address]    Phone: [phone] Fax: [phone]    Open 24 Hours?: No      Medication refill instructions:       If your prescription bottle indicates you have medication refills left, it is not necessary to call your provider’s office. Please contact your pharmacy and they will refill your medication.    If your prescription bottle indicates you do not have any refills left, you may request refills at any time through one of the following ways: The online Sharegate system (except Urgent Care), by calling your provider’s office, or by asking your pharmacy to contact your provider’s office with a refill request. Medication refills are processed only during regular business hours and may not be available until the next business day. Your  provider may request additional information or to have a follow-up visit with you prior to refilling your medication.   *Please Note: Medication refills are assigned a new Rx number when refilled electronically. Your pharmacy may indicate that no refills were authorized even though a new prescription for the same medication is available at the pharmacy. Please request the medicine by name with the pharmacy before contacting your provider for a refill.           Dialoggy Access Code: [email or web address]  Expires: 6/30/2017  4:38 AM    Dialoggy  A secure, online tool to manage your health information     [employer]’s Dialoggy® is a secure, online tool that connects you to your personalized health information from the privacy of your home -- day or night - making it very easy for you to manage your healthcare. Once the activation process is completed, you can even access your medical information using the Dialoggy gagan, which is available for free in the Apple Gagan store or Google Play store.     Dialoggy provides the following levels of access (as shown below):   My Chart Features   [facility] Primary Care Doctor [facility]  Specialists [facility]  Urgent  Care [facility]  Primary Care  Doctor   Email your healthcare team securely and privately 24/7 X X X    Manage appointments: schedule your next appointment; view details of past/upcoming appointments X      Request prescription refills. X      View recent personal medical records, including lab and immunizations X X X X   View health record, including health history, allergies, medications X X X X   Read reports about your outpatient visits, procedures, consult and ER notes X X X X   See your discharge summary, which is a recap of your hospital and/or ER visit that includes your diagnosis, lab results, and care plan. X X       How to register for Dialoggy:  1. Go to  [email or web address].  2. Click on the Sign Up Now box, which takes you to the New Member Sign Up page. You will  need to provide the following information:  a. Enter your Archsy Access Code exactly as it appears at the top of this page. (You will not need to use this code after you’ve completed the sign-up process. If you do not sign up before the expiration date, you must request a new code.)   b. Enter your date of birth.   c. Enter your home email address.   d. Click Submit, and follow the next screen’s instructions.  3. Create a [employer] ID. This will be your Archsy login ID and cannot be changed, so think of one that is secure and easy to remember.  4. Create a Archsy password. You can change your password at any time.  5. Enter your Password Reset Question and Answer. This can be used at a later time if you forget your password.   6. Enter your e-mail address. This allows you to receive e-mail notifications when new information is available in Archsy.  7. Click Sign Up. You can now view your health information.    For assistance activating your Archsy account, call [phone]

## 2017-07-05 ENCOUNTER — APPOINTMENT (OUTPATIENT)
Dept: RADIOLOGY | Facility: MEDICAL CENTER | Age: 82
DRG: 603 | End: 2017-07-05
Attending: EMERGENCY MEDICINE
Payer: MEDICARE

## 2017-07-05 ENCOUNTER — HOSPITAL ENCOUNTER (INPATIENT)
Facility: MEDICAL CENTER | Age: 82
LOS: 4 days | DRG: 603 | End: 2017-07-09
Attending: EMERGENCY MEDICINE | Admitting: FAMILY MEDICINE
Payer: MEDICARE

## 2017-07-05 DIAGNOSIS — L03.115 CELLULITIS OF RIGHT LOWER EXTREMITY: ICD-10-CM

## 2017-07-05 PROBLEM — L03.90 CELLULITIS: Status: ACTIVE | Noted: 2017-07-05

## 2017-07-05 LAB
ALBUMIN SERPL BCP-MCNC: 3.6 G/DL (ref 3.2–4.9)
ALBUMIN/GLOB SERPL: 1 G/DL
ALP SERPL-CCNC: 119 U/L (ref 30–99)
ALT SERPL-CCNC: 10 U/L (ref 2–50)
ANION GAP SERPL CALC-SCNC: 11 MMOL/L (ref 0–11.9)
AST SERPL-CCNC: 15 U/L (ref 12–45)
BASOPHILS # BLD AUTO: 0.8 % (ref 0–1.8)
BASOPHILS # BLD: 0.05 K/UL (ref 0–0.12)
BILIRUB SERPL-MCNC: 0.3 MG/DL (ref 0.1–1.5)
BUN SERPL-MCNC: 49 MG/DL (ref 8–22)
CALCIUM SERPL-MCNC: 9 MG/DL (ref 8.5–10.5)
CHLORIDE SERPL-SCNC: 110 MMOL/L (ref 96–112)
CO2 SERPL-SCNC: 20 MMOL/L (ref 20–33)
CREAT SERPL-MCNC: 1.4 MG/DL (ref 0.5–1.4)
EOSINOPHIL # BLD AUTO: 0.16 K/UL (ref 0–0.51)
EOSINOPHIL NFR BLD: 2.6 % (ref 0–6.9)
ERYTHROCYTE [DISTWIDTH] IN BLOOD BY AUTOMATED COUNT: 57.7 FL (ref 35.9–50)
GFR SERPL CREATININE-BSD FRML MDRD: 35 ML/MIN/1.73 M 2
GLOBULIN SER CALC-MCNC: 3.5 G/DL (ref 1.9–3.5)
GLUCOSE SERPL-MCNC: 93 MG/DL (ref 65–99)
HCT VFR BLD AUTO: 34.8 % (ref 37–47)
HGB BLD-MCNC: 10.7 G/DL (ref 12–16)
IMM GRANULOCYTES # BLD AUTO: 0.02 K/UL (ref 0–0.11)
IMM GRANULOCYTES NFR BLD AUTO: 0.3 % (ref 0–0.9)
LACTATE BLD-SCNC: 0.9 MMOL/L (ref 0.5–2)
LACTATE BLD-SCNC: 1.3 MMOL/L (ref 0.5–2)
LYMPHOCYTES # BLD AUTO: 0.89 K/UL (ref 1–4.8)
LYMPHOCYTES NFR BLD: 14.6 % (ref 22–41)
MCH RBC QN AUTO: 28.2 PG (ref 27–33)
MCHC RBC AUTO-ENTMCNC: 30.7 G/DL (ref 33.6–35)
MCV RBC AUTO: 91.6 FL (ref 81.4–97.8)
MONOCYTES # BLD AUTO: 0.66 K/UL (ref 0–0.85)
MONOCYTES NFR BLD AUTO: 10.8 % (ref 0–13.4)
NEUTROPHILS # BLD AUTO: 4.31 K/UL (ref 2–7.15)
NEUTROPHILS NFR BLD: 70.9 % (ref 44–72)
NRBC # BLD AUTO: 0 K/UL
NRBC BLD AUTO-RTO: 0 /100 WBC
PLATELET # BLD AUTO: 274 K/UL (ref 164–446)
PMV BLD AUTO: 11.4 FL (ref 9–12.9)
POTASSIUM SERPL-SCNC: 4.7 MMOL/L (ref 3.6–5.5)
PROT SERPL-MCNC: 7.1 G/DL (ref 6–8.2)
RBC # BLD AUTO: 3.8 M/UL (ref 4.2–5.4)
SODIUM SERPL-SCNC: 141 MMOL/L (ref 135–145)
WBC # BLD AUTO: 6.1 K/UL (ref 4.8–10.8)

## 2017-07-05 PROCEDURE — 83605 ASSAY OF LACTIC ACID: CPT

## 2017-07-05 PROCEDURE — 85025 COMPLETE CBC W/AUTO DIFF WBC: CPT

## 2017-07-05 PROCEDURE — A9270 NON-COVERED ITEM OR SERVICE: HCPCS | Performed by: FAMILY MEDICINE

## 2017-07-05 PROCEDURE — 87040 BLOOD CULTURE FOR BACTERIA: CPT | Mod: 91

## 2017-07-05 PROCEDURE — 96375 TX/PRO/DX INJ NEW DRUG ADDON: CPT

## 2017-07-05 PROCEDURE — 700102 HCHG RX REV CODE 250 W/ 637 OVERRIDE(OP): Performed by: FAMILY MEDICINE

## 2017-07-05 PROCEDURE — 96367 TX/PROPH/DG ADDL SEQ IV INF: CPT

## 2017-07-05 PROCEDURE — 80053 COMPREHEN METABOLIC PANEL: CPT

## 2017-07-05 PROCEDURE — 96365 THER/PROPH/DIAG IV INF INIT: CPT

## 2017-07-05 PROCEDURE — 700111 HCHG RX REV CODE 636 W/ 250 OVERRIDE (IP)

## 2017-07-05 PROCEDURE — 770020 HCHG ROOM/CARE - TELE (206)

## 2017-07-05 PROCEDURE — 36415 COLL VENOUS BLD VENIPUNCTURE: CPT

## 2017-07-05 PROCEDURE — 73590 X-RAY EXAM OF LOWER LEG: CPT | Mod: RT

## 2017-07-05 PROCEDURE — 96366 THER/PROPH/DIAG IV INF ADDON: CPT

## 2017-07-05 PROCEDURE — 700105 HCHG RX REV CODE 258: Performed by: FAMILY MEDICINE

## 2017-07-05 PROCEDURE — 99285 EMERGENCY DEPT VISIT HI MDM: CPT

## 2017-07-05 PROCEDURE — 700105 HCHG RX REV CODE 258

## 2017-07-05 PROCEDURE — 700111 HCHG RX REV CODE 636 W/ 250 OVERRIDE (IP): Performed by: EMERGENCY MEDICINE

## 2017-07-05 PROCEDURE — 93971 EXTREMITY STUDY: CPT

## 2017-07-05 RX ORDER — HYDROCODONE BITARTRATE AND ACETAMINOPHEN 5; 325 MG/1; MG/1
1 TABLET ORAL EVERY 6 HOURS PRN
Status: DISCONTINUED | OUTPATIENT
Start: 2017-07-05 | End: 2017-07-09 | Stop reason: HOSPADM

## 2017-07-05 RX ORDER — NITROFURANTOIN 25; 75 MG/1; MG/1
100 CAPSULE ORAL 2 TIMES DAILY
COMMUNITY
End: 2017-07-05

## 2017-07-05 RX ORDER — CEPHALEXIN 500 MG/1
500 CAPSULE ORAL 3 TIMES DAILY
COMMUNITY
Start: 2017-06-26 | End: 2017-07-05

## 2017-07-05 RX ORDER — AMPICILLIN AND SULBACTAM 2; 1 G/1; G/1
3 INJECTION, POWDER, FOR SOLUTION INTRAMUSCULAR; INTRAVENOUS ONCE
Status: COMPLETED | OUTPATIENT
Start: 2017-07-05 | End: 2017-07-05

## 2017-07-05 RX ORDER — LOVASTATIN 20 MG/1
20 TABLET ORAL NIGHTLY
Status: DISCONTINUED | OUTPATIENT
Start: 2017-07-05 | End: 2017-07-09 | Stop reason: HOSPADM

## 2017-07-05 RX ORDER — AMOXICILLIN 250 MG
2 CAPSULE ORAL 2 TIMES DAILY
Status: DISCONTINUED | OUTPATIENT
Start: 2017-07-05 | End: 2017-07-09 | Stop reason: HOSPADM

## 2017-07-05 RX ORDER — POLYETHYLENE GLYCOL 3350 17 G/17G
1 POWDER, FOR SOLUTION ORAL
Status: DISCONTINUED | OUTPATIENT
Start: 2017-07-05 | End: 2017-07-09 | Stop reason: HOSPADM

## 2017-07-05 RX ORDER — ONDANSETRON 4 MG/1
4 TABLET, ORALLY DISINTEGRATING ORAL EVERY 4 HOURS PRN
Status: DISCONTINUED | OUTPATIENT
Start: 2017-07-05 | End: 2017-07-09 | Stop reason: HOSPADM

## 2017-07-05 RX ORDER — SODIUM CHLORIDE 9 MG/ML
INJECTION, SOLUTION INTRAVENOUS CONTINUOUS
Status: DISCONTINUED | OUTPATIENT
Start: 2017-07-05 | End: 2017-07-07

## 2017-07-05 RX ORDER — ONDANSETRON 2 MG/ML
4 INJECTION INTRAMUSCULAR; INTRAVENOUS EVERY 4 HOURS PRN
Status: DISCONTINUED | OUTPATIENT
Start: 2017-07-05 | End: 2017-07-09 | Stop reason: HOSPADM

## 2017-07-05 RX ORDER — BISACODYL 10 MG
10 SUPPOSITORY, RECTAL RECTAL
Status: DISCONTINUED | OUTPATIENT
Start: 2017-07-05 | End: 2017-07-09 | Stop reason: HOSPADM

## 2017-07-05 RX ORDER — LISINOPRIL 5 MG/1
10 TABLET ORAL DAILY
Status: DISCONTINUED | OUTPATIENT
Start: 2017-07-06 | End: 2017-07-07

## 2017-07-05 RX ORDER — MORPHINE SULFATE 4 MG/ML
2 INJECTION, SOLUTION INTRAMUSCULAR; INTRAVENOUS ONCE
Status: COMPLETED | OUTPATIENT
Start: 2017-07-05 | End: 2017-07-05

## 2017-07-05 RX ORDER — ACETAMINOPHEN 325 MG/1
650 TABLET ORAL EVERY 6 HOURS PRN
Status: DISCONTINUED | OUTPATIENT
Start: 2017-07-05 | End: 2017-07-09 | Stop reason: HOSPADM

## 2017-07-05 RX ORDER — ONDANSETRON 2 MG/ML
4 INJECTION INTRAMUSCULAR; INTRAVENOUS ONCE
Status: COMPLETED | OUTPATIENT
Start: 2017-07-05 | End: 2017-07-05

## 2017-07-05 RX ADMIN — LOVASTATIN 20 MG: 20 TABLET ORAL at 20:51

## 2017-07-05 RX ADMIN — VANCOMYCIN HYDROCHLORIDE 1300 MG: 100 INJECTION, POWDER, LYOPHILIZED, FOR SOLUTION INTRAVENOUS at 10:08

## 2017-07-05 RX ADMIN — AMPICILLIN SODIUM AND SULBACTAM SODIUM 3 G: 2; 1 INJECTION, POWDER, FOR SOLUTION INTRAMUSCULAR; INTRAVENOUS at 09:26

## 2017-07-05 RX ADMIN — SODIUM CHLORIDE: 9 INJECTION, SOLUTION INTRAVENOUS at 14:47

## 2017-07-05 RX ADMIN — MORPHINE SULFATE 2 MG: 4 INJECTION INTRAVENOUS at 09:22

## 2017-07-05 RX ADMIN — ONDANSETRON 4 MG: 2 INJECTION INTRAMUSCULAR; INTRAVENOUS at 09:22

## 2017-07-05 ASSESSMENT — COGNITIVE AND FUNCTIONAL STATUS - GENERAL
MOBILITY SCORE: 20
DAILY ACTIVITIY SCORE: 23
STANDING UP FROM CHAIR USING ARMS: A LITTLE
TOILETING: A LITTLE
SUGGESTED CMS G CODE MODIFIER MOBILITY: CJ
SUGGESTED CMS G CODE MODIFIER DAILY ACTIVITY: CI
WALKING IN HOSPITAL ROOM: A LITTLE
MOVING FROM LYING ON BACK TO SITTING ON SIDE OF FLAT BED: A LITTLE
CLIMB 3 TO 5 STEPS WITH RAILING: A LITTLE

## 2017-07-05 ASSESSMENT — LIFESTYLE VARIABLES
ON A TYPICAL DAY WHEN YOU DRINK ALCOHOL HOW MANY DRINKS DO YOU HAVE: 1
TOTAL SCORE: 0
EVER FELT BAD OR GUILTY ABOUT YOUR DRINKING: NO
DO YOU DRINK ALCOHOL: NO
DO YOU DRINK ALCOHOL: YES
EVER_SMOKED: YES
EVER HAD A DRINK FIRST THING IN THE MORNING TO STEADY YOUR NERVES TO GET RID OF A HANGOVER: NO
HAVE YOU EVER FELT YOU SHOULD CUT DOWN ON YOUR DRINKING: NO
TOTAL SCORE: 0
CONSUMPTION TOTAL: INCOMPLETE
HAVE PEOPLE ANNOYED YOU BY CRITICIZING YOUR DRINKING: NO
TOTAL SCORE: 0

## 2017-07-05 ASSESSMENT — PAIN SCALES - GENERAL
PAINLEVEL_OUTOF10: 0
PAINLEVEL_OUTOF10: 2
PAINLEVEL_OUTOF10: 0

## 2017-07-05 NOTE — CARE PLAN
Problem: Knowledge Deficit  Goal: Knowledge of disease process/condition, treatment plan, diagnostic tests, and medications will improve  Outcome: PROGRESSING AS EXPECTED  Patient and family updated on plan of care.  No further questions or needs at this time.

## 2017-07-05 NOTE — IP AVS SNAPSHOT
" <p align=\"LEFT\"><IMG SRC=\"//[email or web address]$/[email or web address]\" alt=\"Image\" WIDTH=\"50%\" HEIGHT=\"200\" BORDER=\"\"></p>                   Name:Mila Scanlon  Medical Record Number:[id number]  CSN: [id number]    YOB: 1926   Age: 89 y.o.  Sex: female  HT:1.626 m (5' 4\") WT: 53 kg (116 lb 13.5 oz)          Admit Date: 7/5/2017     Discharge Date:   Today's Date: 7/9/2017  Attending Doctor:  Syed Quick M.D.                  Allergies:  Ciprofloxacin          Follow-up Information     1. Follow up with Umberto Solis M.D. In 1 week.    Specialty:  Family Medicine    Contact information    [address] [place] [address] [address]  [address]  [facility] [address]  [phone]          2. Follow up with [facility], Emergency Dept.    Specialty:  Emergency Medicine    Why:  As needed, If symptoms worsen    Contact information    [address] [facility] [id number] [phone]         Medication List      Take these Medications        Instructions    acetaminophen 500 MG Tabs   Commonly known as:  TYLENOL    Take 500 mg by mouth every 6 hours as needed for Moderate Pain.   Dose:  500 mg       lisinopril 20 MG Tabs   Commonly known as:  PRINIVIL    Take 20 mg by mouth every day.   Dose:  20 mg       lovastatin 40 MG tablet   Commonly known as:  MEVACOR    Take 40 mg by mouth every evening.   Dose:  40 mg       sulfamethoxazole-trimethoprim 800-160 MG tablet   Commonly known as:  BACTRIM DS    Take 1 Tab by mouth every 12 hours for 6 days.   Dose:  1 Tab         "

## 2017-07-05 NOTE — ED PROVIDER NOTES
"ED Provider Note      CHIEF COMPLAINT   Chief Complaint   Patient presents with   • Wound Check   • Pain     R leg wound       HPI   Mila Scanlon is a 89 y.o. female who presents to the emergency room with right lower extremity pain. Patient sustained some abrasions about 2 weeks ago after a fall area seen in the ER. Her wounds were cleansed. She ended up having increased redness. She was seen by her primary doctor. She is given a prescription for antibiotics. She completed a course of antibiotics and has had gradually worsening redness swelling and pain of the right lower extremity. Nonradiating throbbing. Worse with movement. Associated swelling.    REVIEW OF SYSTEMS   Pertinent negative: She has not noticed any fevers. No cough, dysuria, hematuria, chest pain shortness of breath  All other systems reviewed and negative    PAST MEDICAL HISTORY   Past Medical History   Diagnosis Date   • Hypertension    • High cholesterol    • IBS (irritable bowel syndrome)    • Anxiety    • Back pain    • Cataract    • Arthritis    • Skin cancer 2013   • Carotid artery obstruction 2008   • Anxiety    • Kidney stones 2008       SOCIAL HISTORY  Social History   Substance Use Topics   • Smoking status: Former Smoker     Quit date: 10/01/2015   • Smokeless tobacco: None   • Alcohol Use: No       ALLERGIES   See chart    PHYSICAL EXAM  VITAL SIGNS: /60 mmHg  Pulse 87  Temp(Src) 36.1 °C (96.9 °F) (Temporal)  Resp 16  Ht 1.626 m (5' 4.02\")  Wt 51.8 kg (114 lb 3.2 oz)  BMI 19.59 kg/m2  SpO2 97%  Head: Atraumatic  Eyes: Eyes normal inspection  Neck: has full range of motion, normal inspection.  Constitutional: No acute distress , pleasant elderly female  Cardiovascular: Normal heart rate. Pulses 2+ dorsalis pedis  Thorax & Lungs: No respiratory distress  Skin: Diffuse redness and warmth of the right anterior lower leg. Scattered abrasions over both lower extremities.  Musculoskeletal: As asymmetric swelling of the right lower " extremity versus the left. No palpable cords.  Neurologic:  Normal sensory and motor    LE VENOUS DUPLEX (Specify in Comments Left, Right Or Bilateral)   Preliminary Result      DX-TIBIA AND FIBULA RIGHT   Final Result      No evidence of fracture.            INTERPRETING LOCATION:  [address] [place] [address], [id number]        Results for orders placed or performed during the hospital encounter of 07/05/17   LACTIC ACID   Result Value Ref Range    Lactic Acid 1.3 0.5 - 2.0 mmol/L   LACTIC ACID   Result Value Ref Range    Lactic Acid 0.9 0.5 - 2.0 mmol/L   CBC WITH DIFFERENTIAL   Result Value Ref Range    WBC 6.1 4.8 - 10.8 K/uL    RBC 3.80 (L) 4.20 - 5.40 M/uL    Hemoglobin 10.7 (L) 12.0 - 16.0 g/dL    Hematocrit 34.8 (L) 37.0 - 47.0 %    MCV 91.6 81.4 - 97.8 fL    MCH 28.2 27.0 - 33.0 pg    MCHC 30.7 (L) 33.6 - 35.0 g/dL    RDW 57.7 (H) 35.9 - 50.0 fL    Platelet Count 274 164 - 446 K/uL    MPV 11.4 9.0 - 12.9 fL    Neutrophils-Polys 70.90 44.00 - 72.00 %    Lymphocytes 14.60 (L) 22.00 - 41.00 %    Monocytes 10.80 0.00 - 13.40 %    Eosinophils 2.60 0.00 - 6.90 %    Basophils 0.80 0.00 - 1.80 %    Immature Granulocytes 0.30 0.00 - 0.90 %    Nucleated RBC 0.00 /100 WBC    Neutrophils (Absolute) 4.31 2.00 - 7.15 K/uL    Lymphs (Absolute) 0.89 (L) 1.00 - 4.80 K/uL    Monos (Absolute) 0.66 0.00 - 0.85 K/uL    Eos (Absolute) 0.16 0.00 - 0.51 K/uL    Baso (Absolute) 0.05 0.00 - 0.12 K/uL    Immature Granulocytes (abs) 0.02 0.00 - 0.11 K/uL    NRBC (Absolute) 0.00 K/uL   COMP METABOLIC PANEL   Result Value Ref Range    Sodium 141 135 - 145 mmol/L    Potassium 4.7 3.6 - 5.5 mmol/L    Chloride 110 96 - 112 mmol/L    Co2 20 20 - 33 mmol/L    Anion Gap 11.0 0.0 - 11.9    Glucose 93 65 - 99 mg/dL    Bun 49 (H) 8 - 22 mg/dL    Creatinine 1.40 0.50 - 1.40 mg/dL    Calcium 9.0 8.5 - 10.5 mg/dL    AST(SGOT) 15 12 - 45 U/L    ALT(SGPT) 10 2 - 50 U/L    Alkaline Phosphatase 119 (H) 30 - 99 U/L    Total Bilirubin 0.3 0.1 - 1.5 mg/dL    Albumin  3.6 3.2 - 4.9 g/dL    Total Protein 7.1 6.0 - 8.2 g/dL    Globulin 3.5 1.9 - 3.5 g/dL    A-G Ratio 1.0 g/dL   ESTIMATED GFR   Result Value Ref Range    GFR If  43 (A) >60 mL/min/1.73 m 2    GFR If Non African American 35 (A) >60 mL/min/1.73 m 2         COURSE & MEDICAL DECISION MAKING    Patient presents with wound infections of the right lower extremity. She has asymmetric swelling of the right lower extremity. Venous duplex was negative. She does not have any fractures. Laboratory data was unremarkable. She has failed outpatient antibiotics. She will be admitted to the hospital for further treatment. She was given vancomycin and Unasyn in the emergency department. She does not have evidence of sepsis. [facility] was consulted for admission.    FINAL IMPRESSION  1. Right lower extremity cellulitis/wound infection      This dictation was created using voice recognition software. The accuracy of the dictation is limited to the abilities of the software. I expect there may be some errors of grammar and possibly content. The nursing notes were reviewed and certain aspects of this information were incorporated into this note.      Electronically signed by: Umberto Love, 7/5/2017 11:07 AM

## 2017-07-05 NOTE — H&P
[facility] MEDICINE HISTORY AND PHYSICAL     PATIENT ID:  NAME:  Mila Scanlon  MRN:               [id number]  YOB: 1926    Date of Admission: 7/5/2017     Attending: Dr. Quick    Resident: Dr. Calix    Primary Care Physician:  Dr. Solis    CC:  Cellulitis    HPI: Mila Scanlon is a 89 y.o. female who presented with RLE cellulitis. 3 weeks ago she had a fall. She came to the ED and she got steri strips on her R leg and L arm. She got one antibiotic injection then sent home without abx.Then went to  1-2 days later and they gave her antibiotics. 10 days ago, Dr. VILLATORO doubled her antibiotic given by . She has almost finished that course, today is the last day. The leg was causing her pain last night and this morning. She was feeling fairly well yesterday. She denies fever, but feels sweaty, especially at night. She denies cp, sob, n/v, diarrhea. She has a h/o irritable bowel syndrome and recently was constipated, resolved with miralax.     REVIEW OF SYSTEMS:   Ten systems reviewed and were negative except as noted in the HPI.                PAST MEDICAL HISTORY:  Past Medical History   Diagnosis Date   • Hypertension    • High cholesterol    • IBS (irritable bowel syndrome)    • Anxiety    • Back pain    • Cataract    • Arthritis    • Skin cancer 2013   • Carotid artery obstruction 2008   • Anxiety    • Kidney stones 2008     PAST SURGICAL HISTORY:  Past Surgical History   Procedure Laterality Date   • Hysterectomy laparoscopy       FAMILY HISTORY:  Family History   Problem Relation Age of Onset   • Heart Disease Mother    • Heart Disease Sister      SOCIAL HISTORY:   Pt lives in apartment, alone  Smoking: former, smoked for 40 years, very minimal daily use  Etoh use: rare   Drug use: none    DIET:   No orders of the defined types were placed in this encounter.       ALLERGIES:  Allergies   Allergen Reactions   • Ciprofloxacin Nausea       OUTPATIENT MEDICATIONS:  Current Outpatient Rx   Name  Route   "Sig  Dispense  Refill   • cephALEXin (KEFLEX) 500 MG Cap    Oral    Take 500 mg by mouth 3 times a day. Pt started a 10 day course on              • lovastatin (MEVACOR) 40 MG tablet    Oral    Take 40 mg by mouth every evening.             • acetaminophen (TYLENOL) 500 MG Tab    Oral    Take 500 mg by mouth every 6 hours as needed for Moderate Pain.             • lisinopril (PRINIVIL) 20 MG TABS    Oral    Take 20 mg by mouth every day.                 PHYSICAL EXAM:  Filed Vitals:    17 0726   BP: 148/60   Pulse: 87   Temp: 36.1 °C (96.9 °F)   TempSrc: Temporal   Resp: 16   Height: 1.626 m (5' 4.02\")   Weight: 51.8 kg (114 lb 3.2 oz)   SpO2: 97%   , Temp (24hrs), Av.1 °C (96.9 °F), Min:36.1 °C (96.9 °F), Max:36.1 °C (96.9 °F)  , Pulse Oximetry: 97 %    General: Pt resting in NAD, cooperative   Skin:  Pink, warm and dry.  No rashes  HEENT: NC/AT. PERRL. EOMI. MMM. No nasal discharge. Oropharynx nonerythematous without exudate/plaques  Neck:  Supple without lymphadenopathy or rigidity.  Lungs:  Symmetrical.  CTAB with no W/R/R.  Good air movement   Cardiovascular:  S1/S2 RRR without M/R/G.  Abdomen:  Abdomen is soft NT/ND. +BS. No masses noted.  Extremities:  Full range of motion. 2+ pulses in all extremities. No C/C/E. R leg with 2-3 scabbed wounds anteriorly below the knee with erythema, L leg with purplish erythema on the shin without open wounds  Spine:  Straight without vertebral anomalies.  CNS:  Muscle tone is normal. Cranial nerves II-XII grossly intact.       ERCourse:  Labs were obtained and she was started on vancomycin.    LAB TESTS:   Recent Labs      17   0850   WBC  6.1   RBC  3.80*   HEMOGLOBIN  10.7*   HEMATOCRIT  34.8*   MCV  91.6   MCH  28.2   RDW  57.7*   PLATELETCT  274   MPV  11.4   NEUTSPOLYS  70.90   LYMPHOCYTES  14.60*   MONOCYTES  10.80   EOSINOPHILS  2.60   BASOPHILS  0.80         Recent Labs      17   0850   SODIUM  141   POTASSIUM  4.7   CHLORIDE  110   CO2  20 " "  BUN  49*   CREATININE  1.40   CALCIUM  9.0   ALBUMIN  3.6       CULTURES:   Results     Procedure Component Value Units Date/Time    BLOOD CULTURE [[id number]] Collected:  07/05/17 0850    Order Status:  Completed Specimen Information:  Blood from Peripheral Updated:  07/05/17 1012    Narrative:      Per Hospital Policy: Only change Specimen Src: to \"Line\" if  specified by physician order.    BLOOD CULTURE [[id number]] Collected:  07/05/17 0902    Order Status:  Completed Specimen Information:  Blood from Peripheral Updated:  07/05/17 1012    Narrative:      Per Hospital Policy: Only change Specimen Src: to \"Line\" if  specified by physician order.          IMAGES:  7/5 Tib-Fib XR: \"No evidence of fracture.\"    ASSESSMENT/PLAN: 89 y.o. female admitted for worsening RLE cellulitis.    RLE Cellulitis  - initial fall with some trauma to the leg 3 weeks ago, then developed cellulitis  - she was seen outpatient and has had 9/10 days of keflex  - continues to worsen  - VSS, no evidence of sepsis  - WBC wnl, lactate 1.3 --> 0.9   -    - vancomycin per pharmacy protocol   - norco 5/325mg prn pain   - IVF at maintenance   - repeat CBC in AM    Bradycardia  - HR 40s-50s at lowest   - no home meds contributing   - monitor on tele    Anemia  - Hb 10.7  - baseline 12-13   - will monitor    Chronic Conditions  - HTN: lisinopril decreased to 10mg by PCP for concern of contributing to falls   - cont home lisinopril 10mg daily  - HLD: lovastatin also decreased by 1/2 by PCP   - cont home lovastatin 20mg daily  - IBS: bowel regimen while in hospital    PPx: DVT - enoxaparin, no SCDs  Lines: PIV  Tubes: none  Diet: based on nurse dysphagia screen  PCP: Will  Code Status: DNAR    Dispo: admit to tele, worsening RLE cellulitis    Case discussed with attending, Dr. Quick.  "

## 2017-07-05 NOTE — PROGRESS NOTES
"Pharmacy Kinetics 89 y.o. female on vancomycin day # 1 2017    Currently on Vancomycin new start    Indication for Treatment: SSTI    Pertinent history per medical record: Admitted on 2017 for RLE cellulitis.  Patient had a fall 3 weeks ago. She came to the ED and steri strips were applied to the wound. She presented again to her PCP, at which point erythema and cellulitis was noted around the wound. She was given ceftriaxone in the office and sent home with a prescription for cephalexin, which she has been taking for 9 days. Despite this, she has persistent pain and erythema on the shin. Vancomycin initiated for presumed SSTI.     Other antibiotics: none    Allergies: Ciprofloxacin     List concerns for renal function: age, SCr 1.4    Pertinent cultures to date:    PBC x 2: in process    Recent Labs      17   0850   WBC  6.1   NEUTSPOLYS  70.90     Recent Labs      17   0850   BUN  49*   CREATININE  1.40   ALBUMIN  3.6     Blood pressure 107/50, pulse 62, temperature 36.6 °C (97.9 °F), temperature source Temporal, resp. rate 18, height 1.626 m (5' 4\"), weight 51.8 kg (114 lb 3.2 oz), SpO2 94 %, not currently breastfeeding. Temp (24hrs), Av.1 °C (97 °F), Min:35.7 °C (96.3 °F), Max:36.6 °C (97.9 °F)      A/P   1. Vancomycin dose change: initiate pulse dosing  2. Next vancomycin level: /6 @ 0600  3. Goal trough: 12-16 mcg/mL  4. Comments: new start vancomycin for SSTI. Patient has significant concerns for renal insult and accumulation. Will check a level tomorrow morning at 0600 (~20 hour level). Renal function appears to be at baseline for now. Patient is poor candidate for long-term vancomycin. Would recommend de-escalation as soon as possible if MRSA can be ruled out. Will continue to follow.    Ana Luisa Benitez, PHARMD      "

## 2017-07-05 NOTE — ED NOTES
Med rec complete per Pt at bedside  Allergies reviewed  Pt is currently on a 10 day course of Keflex  Started on 6/26

## 2017-07-05 NOTE — IP AVS SNAPSHOT
[employer] Access Code: [email or web address]  Expires: 7/29/2017  4:10 AM    [employer]  A secure, online tool to manage your health information     [employer]’s [employer]® is a secure, online tool that connects you to your personalized health information from the privacy of your home -- day or night - making it very easy for you to manage your healthcare. Once the activation process is completed, you can even access your medical information using the [employer] gagan, which is available for free in the Apple Gagan store or Google Play store.     [employer] provides the following levels of access (as shown below):   My Chart Features   [facility] Primary Care Doctor [facility]  Specialists [facility]  Urgent  Care Non-[facility]  Primary Care  Doctor   Email your healthcare team securely and privately 24/7 X X X X   Manage appointments: schedule your next appointment; view details of past/upcoming appointments X      Request prescription refills. X      View recent personal medical records, including lab and immunizations X X X X   View health record, including health history, allergies, medications X X X X   Read reports about your outpatient visits, procedures, consult and ER notes X X X X   See your discharge summary, which is a recap of your hospital and/or ER visit that includes your diagnosis, lab results, and care plan. X X       How to register for [employer]:  1. Go to  [email or web address].  2. Click on the Sign Up Now box, which takes you to the New Member Sign Up page. You will need to provide the following information:  a. Enter your [employer] Access Code exactly as it appears at the top of this page. (You will not need to use this code after you’ve completed the sign-up process. If you do not sign up before the expiration date, you must request a new code.)   b. Enter your date of birth.   c. Enter your home email address.   d. Click Submit, and follow the next screen’s instructions.  3. Create a [employer] ID. This will be your [employer]  login ID and cannot be changed, so think of one that is secure and easy to remember.  4. Create a [employer] password. You can change your password at any time.  5. Enter your Password Reset Question and Answer. This can be used at a later time if you forget your password.   6. Enter your e-mail address. This allows you to receive e-mail notifications when new information is available in [employer].  7. Click Sign Up. You can now view your health information.    For assistance activating your [employer] account, call [phone]

## 2017-07-05 NOTE — IP AVS SNAPSHOT
" Home Care Instructions                                                                                                                  Name:Mila Scanlon  Medical Record Number:[id number]  CSN: [id number]    YOB: 1926   Age: 89 y.o.  Sex: female  HT:1.626 m (5' 4\") WT: 53 kg (116 lb 13.5 oz)          Admit Date: 7/5/2017     Discharge Date:   Today's Date: 7/9/2017  Attending Doctor:  Syed Quick M.D.                  Allergies:  Ciprofloxacin            Discharge Instructions       Discharge Instructions    Discharged to home by car with relative. Discharged via wheelchair, hospital escort: Yes.  Special equipment needed: Not Applicable    Be sure to schedule a follow-up appointment with your primary care doctor or any specialists as instructed.     Discharge Plan:   Diet Plan: Discussed  Activity Level: Discussed  Smoking Cessation Offered: Patient Refused  Confirmed Follow up Appointment: Patient to Call and Schedule Appointment  Confirmed Symptoms Management: Discussed  Medication Reconciliation Updated: Yes  Influenza Vaccine Indication: Not indicated: Previously immunized this influenza season and > 8 years of age    I understand that a diet low in cholesterol, fat, and sodium is recommended for good health. Unless I have been given specific instructions below for another diet, I accept this instruction as my diet prescription.   Other diet: regular    Special Instructions: None    · Is patient discharged on Warfarin / Coumadin?   No     · Is patient Post Blood Transfusion?  No    Depression / Suicide Risk    As you are discharged from this [facility] facility, it is important to learn how to keep safe from harming yourself.    Recognize the warning signs:  · Abrupt changes in personality, positive or negative- including increase in energy   · Giving away possessions  · Change in eating patterns- significant weight changes-  positive or negative  · Change in sleeping patterns- unable to sleep or " sleeping all the time   · Unwillingness or inability to communicate  · Depression  · Unusual sadness, discouragement and loneliness  · Talk of wanting to die  · Neglect of personal appearance   · Rebelliousness- reckless behavior  · Withdrawal from people/activities they love  · Confusion- inability to concentrate     If you or a loved one observes any of these behaviors or has concerns about self-harm, here's what you can do:  · Talk about it- your feelings and reasons for harming yourself  · Remove any means that you might use to hurt yourself (examples: pills, rope, extension cords, firearm)  · Get professional help from the community (Mental Health, Substance Abuse, psychological counseling)  · Do not be alone:Call your Safe Contact- someone whom you trust who will be there for you.  · Call your local CRISIS HOTLINE 352-9137 or 549-473-5497  · Call your local Children's Mobile Crisis Response Team [place] (641) 304-6700 or www.[employer]  · Call the toll free National Suicide Prevention Hotlines   · National Suicide Prevention Lifeline 024-829-QSRL (0011)  · [place] Hope Line Network 800-SUICIDE (374-4036)        Cellulitis  Cellulitis is an infection of the skin and the tissue under the skin. The infected area is usually red and tender. This happens most often in the arms and lower legs.  HOME CARE   · Take your antibiotic medicine as told. Finish the medicine even if you start to feel better.  · Keep the infected arm or leg raised (elevated).  · Put a warm cloth on the area up to 4 times per day.  · Only take medicines as told by your doctor.  · Keep all doctor visits as told.  GET HELP IF:  2. You see red streaks on the skin coming from the infected area.  3. Your red area gets bigger or turns a dark color.  4. Your bone or joint under the infected area is painful after the skin heals.  5. Your infection comes back in the same area or different area.  6. You have a puffy (swollen) bump in the  infected area.  7. You have new symptoms.  8. You have a fever.  GET HELP RIGHT AWAY IF:   2. You feel very sleepy.  3. You throw up (vomit) or have watery poop (diarrhea).  4. You feel sick and have muscle aches and pains.  MAKE SURE YOU:   2. Understand these instructions.  3. Will watch your condition.  4. Will get help right away if you are not doing well or get worse.     This information is not intended to replace advice given to you by your health care provider. Make sure you discuss any questions you have with your health care provider.     Document Released: 06/05/2009 Document Revised: 01/08/2016 Document Reviewed: 03/04/2013  [employer] Interactive Patient Education ©2016 [employer]      Follow-up Information     1. Follow up with Umberto Solis M.D. In 1 week.    Specialty:  Family Medicine    Contact information    [address] [place] [address] [address]  [address]  [facility] [address]  [phone]          2. Follow up with [facility], Emergency Dept.    Specialty:  Emergency Medicine    Why:  As needed, If symptoms worsen    Contact information    [address] [facility] [id number] [phone]         Discharge Medication Instructions:    Below are the medications your physician expects you to take upon discharge:    Review all your home medications and newly ordered medications with your doctor and/or pharmacist. Follow medication instructions as directed by your doctor and/or pharmacist.    Please keep your medication list with you and share with your physician.               Medication List      START taking these medications        Instructions    Morning Afternoon Evening Bedtime    sulfamethoxazole-trimethoprim 800-160 MG tablet   Last time this was given:  1 Tab on 7/9/2017  9:10 AM   Commonly known as:  BACTRIM DS        Take 1 Tab by mouth every 12 hours for 6 days.   Dose:  1 Tab                          CONTINUE taking these medications        Instructions    Morning Afternoon Evening  Bedtime    acetaminophen 500 MG Tabs   Commonly known as:  TYLENOL        Take 500 mg by mouth every 6 hours as needed for Moderate Pain.   Dose:  500 mg                        lisinopril 20 MG Tabs   Last time this was given:  20 mg on 7/9/2017  9:10 AM   Commonly known as:  PRINIVIL        Take 20 mg by mouth every day.   Dose:  20 mg                        lovastatin 40 MG tablet   Last time this was given:  20 mg on 7/8/2017  9:28 PM   Commonly known as:  MEVACOR        Take 40 mg by mouth every evening.   Dose:  40 mg                             Where to Get Your Medications      Information about where to get these medications is not yet available     ! Ask your nurse or doctor about these medications    - sulfamethoxazole-trimethoprim 800-160 MG tablet            Orders for after discharge     REFERRAL TO HOME HEALTH    Complete by:  As directed    Home health will create and establish a plan of care       REFERRAL TO WOUND CLINIC    Complete by:  As directed    Bilateral lower extremities with partial to full thickness abrasions.             Instructions           Diet / Nutrition:    Follow any diet instructions given to you by your doctor or the dietician, including how much salt (sodium) you are allowed each day.    If you are overweight, talk to your doctor about a weight reduction plan.    Activity:    Remain physically active following your doctor's instructions about exercise and activity.    Rest often.     Any time you become even a little tired or short of breath, SIT DOWN and rest.    Worsening Symptoms:    Report any of the following signs and symptoms to the doctor's office immediately:    *Pain of jaw, arm, or neck  *Chest pain not relieved by medication                               *Dizziness or loss of consciousness  *Difficulty breathing even when at rest   *More tired than usual                                       *Bleeding drainage or swelling of surgical site  *Swelling of feet,  ankles, legs or stomach                 *Fever (>100ºF)  *Pink or blood tinged sputum  *Weight gain (3lbs/day or 5lbs /week)           *Shock from internal defibrillator (if applicable)  *Palpitations or irregular heartbeats                *Cool and/or numb extremities    Stroke Awareness    Common Risk Factors for Stroke include:    Age  Atrial Fibrillation  Carotid Artery Stenosis  Diabetes Mellitus  Excessive alcohol consumption  High blood pressure  Overweight   Physical inactivity  Smoking    Warning signs and symptoms of a stroke include:    *Sudden numbness or weakness of the face, arm or leg (especially on one side of the body).  *Sudden confusion, trouble speaking or understanding.  *Sudden trouble seeing in one or both eyes.  *Sudden trouble walking, dizziness, loss of balance or coordination.Sudden severe headache with no known cause.    It is very important to get treatment quickly when a stroke occurs. If you experience any of the above warning signs, call 911 immediately.                   Disclaimer         Quit Smoking / Tobacco Use:    I understand the use of any tobacco products increases my chance of suffering from future heart disease or stroke and could cause other illnesses which may shorten my life. Quitting the use of tobacco products is the single most important thing I can do to improve my health. For further information on smoking / tobacco cessation call a Toll Free Quit Line at 1-559.223.3211 (*[employer]) or 1-788.329.7423 (American Lung Association) or you can access the web based program at www.lungusa.org.    [place] Tobacco Users Help Line:  (200) 961-7523       Toll Free: 1-697.350.3679  Quit Tobacco Program [facility] (333)712-6287    Crisis Hotline:    [place] Crisis Hotline:  9-209-SURFVXC or 1-251.311.1267    [place] Crisis Hotline:    1-823.275.1760 or 059-510-5565    Discharge Survey:   Thank you for choosing [facility]. We hope we did  everything we could to make your hospital stay a pleasant one. You may be receiving a phone survey and we would appreciate your time and participation in answering the questions. Your input is very valuable to us in our efforts to improve our service to our patients and their families.        My signature on this form indicates that:    1. I have reviewed and understand the above information.  2. My questions regarding this information have been answered to my satisfaction.  3. I have formulated a plan with my discharge nurse to obtain my prescribed medications for home.                  Disclaimer         __________________________________                     __________       ________                       Patient Signature                                                 Date                    Time

## 2017-07-05 NOTE — ED NOTES
Chief Complaint   Patient presents with   • Wound Check   • Pain     R leg wound       Pt seen here previously for wound check and rx for abx.  Pain is worse and leg is red

## 2017-07-05 NOTE — ED NOTES
To room in WC with daughter.  Pt had fall 2 weeks ago.  Steri strips to Medial R lower leg wound at that time.  Redness, pain, swelling to RLE.  Increasing redness.  Denies fevers.  Seen by PCP 10 days ago and placed on ABX x10 days.  Worsening since that time.  ABX complete. In gown.  Chart placed for JESUS claros.

## 2017-07-05 NOTE — IP AVS SNAPSHOT
7/9/2017    Mila Scanlon  [address]  [facility] [address]    Dear Mila:    [facility] wants to ensure your discharge home is safe and you or your loved ones have had all of your questions answered regarding your care after you leave the hospital.    Below is a list of resources and contact information should you have any questions regarding your hospital stay, follow-up instructions, or active medical symptoms.    Questions or Concerns Regarding… Contact   Medical Questions Related to Your Discharge  (7 days a week, 8am-5pm) Contact a Nurse Care Coordinator   [phone]   Medical Questions Not Related to Your Discharge  (24 hours a day / 7 days a week)  Contact the Nurse Health Line   [phone]    Medications or Discharge Instructions Refer to your discharge packet   or contact your [facility] Primary Care Provider   [phone]   Follow-up Appointment(s) Schedule your appointment via [employer]   or contact Scheduling [phone]   Billing Review your statement via [employer]  or contact Billing [phone]   Medical Records Review your records via [employer]   or contact Medical Records [phone]     You may receive a telephone call within two days of discharge. This call is to make certain you understand your discharge instructions and have the opportunity to have any questions answered. You can also easily access your medical information, test results and upcoming appointments via the [employer] free online health management tool. You can learn more and sign up at [employer]. For assistance setting up your [employer] account, please call [phone].    Once again, we want to ensure your discharge home is safe and that you have a clear understanding of any next steps in your care. If you have any questions or concerns, please do not hesitate to contact us, we are here for you. Thank you for choosing [facility] for your healthcare needs.    Sincerely,    Your [facility] Healthcare Team

## 2017-07-05 NOTE — PROGRESS NOTES
2 RN skin check completed with ALEC Gant.  Generalized bruising to upper chest and anterior neck.  Generalized bruising to bilateral arms with multiple scabs noted to left elbow.  Blanchable redness to sacral area.  Blanchable redness to bilateral heels.  Right heel scab.  Right lower extremity below knee with multiple scabs and erythema.  Left lower extremity below knee with purple bruising.  Wound pictures uploaded to epic.

## 2017-07-06 LAB
BASOPHILS # BLD AUTO: 1.3 % (ref 0–1.8)
BASOPHILS # BLD: 0.06 K/UL (ref 0–0.12)
EOSINOPHIL # BLD AUTO: 0.18 K/UL (ref 0–0.51)
EOSINOPHIL NFR BLD: 3.9 % (ref 0–6.9)
ERYTHROCYTE [DISTWIDTH] IN BLOOD BY AUTOMATED COUNT: 57.7 FL (ref 35.9–50)
HCT VFR BLD AUTO: 30.9 % (ref 37–47)
HGB BLD-MCNC: 9.3 G/DL (ref 12–16)
IMM GRANULOCYTES # BLD AUTO: 0.02 K/UL (ref 0–0.11)
IMM GRANULOCYTES NFR BLD AUTO: 0.4 % (ref 0–0.9)
LYMPHOCYTES # BLD AUTO: 0.76 K/UL (ref 1–4.8)
LYMPHOCYTES NFR BLD: 16.5 % (ref 22–41)
MCH RBC QN AUTO: 27.7 PG (ref 27–33)
MCHC RBC AUTO-ENTMCNC: 30.1 G/DL (ref 33.6–35)
MCV RBC AUTO: 92 FL (ref 81.4–97.8)
MONOCYTES # BLD AUTO: 0.49 K/UL (ref 0–0.85)
MONOCYTES NFR BLD AUTO: 10.6 % (ref 0–13.4)
NEUTROPHILS # BLD AUTO: 3.1 K/UL (ref 2–7.15)
NEUTROPHILS NFR BLD: 67.3 % (ref 44–72)
NRBC # BLD AUTO: 0 K/UL
NRBC BLD AUTO-RTO: 0 /100 WBC
PLATELET # BLD AUTO: 239 K/UL (ref 164–446)
PMV BLD AUTO: 11.5 FL (ref 9–12.9)
RBC # BLD AUTO: 3.36 M/UL (ref 4.2–5.4)
VANCOMYCIN SERPL-MCNC: 12.8 UG/ML
WBC # BLD AUTO: 4.6 K/UL (ref 4.8–10.8)

## 2017-07-06 PROCEDURE — 36415 COLL VENOUS BLD VENIPUNCTURE: CPT

## 2017-07-06 PROCEDURE — 80202 ASSAY OF VANCOMYCIN: CPT

## 2017-07-06 PROCEDURE — 770020 HCHG ROOM/CARE - TELE (206)

## 2017-07-06 PROCEDURE — A9270 NON-COVERED ITEM OR SERVICE: HCPCS | Performed by: FAMILY MEDICINE

## 2017-07-06 PROCEDURE — 700111 HCHG RX REV CODE 636 W/ 250 OVERRIDE (IP)

## 2017-07-06 PROCEDURE — 700102 HCHG RX REV CODE 250 W/ 637 OVERRIDE(OP): Performed by: FAMILY MEDICINE

## 2017-07-06 PROCEDURE — 85025 COMPLETE CBC W/AUTO DIFF WBC: CPT

## 2017-07-06 PROCEDURE — 700111 HCHG RX REV CODE 636 W/ 250 OVERRIDE (IP): Performed by: FAMILY MEDICINE

## 2017-07-06 PROCEDURE — 700105 HCHG RX REV CODE 258

## 2017-07-06 PROCEDURE — 700105 HCHG RX REV CODE 258: Performed by: FAMILY MEDICINE

## 2017-07-06 RX ORDER — LISINOPRIL 5 MG/1
10 TABLET ORAL
Status: DISCONTINUED | OUTPATIENT
Start: 2017-07-07 | End: 2017-07-07

## 2017-07-06 RX ADMIN — VANCOMYCIN HYDROCHLORIDE 1000 MG: 100 INJECTION, POWDER, LYOPHILIZED, FOR SOLUTION INTRAVENOUS at 12:11

## 2017-07-06 RX ADMIN — HYDROCODONE BITARTRATE AND ACETAMINOPHEN 1 TABLET: 5; 325 TABLET ORAL at 07:26

## 2017-07-06 RX ADMIN — LISINOPRIL 10 MG: 5 TABLET ORAL at 08:19

## 2017-07-06 RX ADMIN — ENOXAPARIN SODIUM 30 MG: 100 INJECTION SUBCUTANEOUS at 08:18

## 2017-07-06 RX ADMIN — SODIUM CHLORIDE: 9 INJECTION, SOLUTION INTRAVENOUS at 01:10

## 2017-07-06 RX ADMIN — SODIUM CHLORIDE: 9 INJECTION, SOLUTION INTRAVENOUS at 11:56

## 2017-07-06 RX ADMIN — POLYETHYLENE GLYCOL 3350 1 PACKET: 17 POWDER, FOR SOLUTION ORAL at 08:19

## 2017-07-06 RX ADMIN — LOVASTATIN 20 MG: 20 TABLET ORAL at 20:09

## 2017-07-06 RX ADMIN — STANDARDIZED SENNA CONCENTRATE AND DOCUSATE SODIUM 2 TABLET: 8.6; 5 TABLET, FILM COATED ORAL at 20:09

## 2017-07-06 ASSESSMENT — PAIN SCALES - GENERAL
PAINLEVEL_OUTOF10: 0
PAINLEVEL_OUTOF10: 6
PAINLEVEL_OUTOF10: 0

## 2017-07-06 NOTE — PROGRESS NOTES
Monitor Summary  SB/SR with first degree AV block 54-63, HR down to 48 non-sustained  Occasional PVC  0.24/0.08/0.44

## 2017-07-06 NOTE — PROGRESS NOTES
[facility] FAMILY MEDICINE PROGRESS NOTE     Attending: Abdelrahman    Resident: Yogi/Hamzah    PATIENT: Mila Scanlon; [id number]; 9/20/1926    ID: 89 y.o. Female admitted for worsening RLE cellulitis.    SUBJECTIVE: No acute events overnight. Denies SOB, dizziness, chest pain, nausea, vomiting, abdominal pain. Eating, urinating well. Some constipation since yesterday. Some pain in RLE but otherwise no complaints, no concerns per nurse.    OBJECTIVE:     Filed Vitals:    07/05/17 1532 07/05/17 2020 07/06/17 0020 07/06/17 0414   BP: 107/50 128/56 171/72 139/57   Pulse: 62 62 80 66   Temp: 36.6 °C (97.9 °F) 36.9 °C (98.4 °F) 36.9 °C (98.5 °F) 36.3 °C (97.3 °F)   TempSrc:       Resp: 18 16 16 18   Height:       Weight:  51.8 kg (114 lb 3.2 oz)     SpO2: 94% 98% 92% 95%       Intake/Output Summary (Last 24 hours) at 07/06/17 0538  Last data filed at 07/05/17 1741   Gross per 24 hour   Intake    630 ml   Output      0 ml   Net    630 ml       PE:  General: No acute distress, resting comfortably in bed.  HEENT: NC/AT.EOMI. MMM  Cardiovascular: RRR with no M/R/G.  Respiratory: Symmetrical chest. CTAB with no W/R/R  Abdomen: soft, NT/ND, no masses, +BS   EXT:  HULL,   2+ radial and dorsalis pedis pulses bilaterally, multiple healing contusions on L lower arm, L leg. >5cm long scab on R anteromedial shin with some oozing, multiple healing contusions also on R lower leg, erythema extending from just above  malleolus to below knee  Neuro: non focal with no numbness, tingling, CN 2-12 grossly intact    LABS:  Recent Labs      07/05/17   0850   WBC  6.1   RBC  3.80*   HEMOGLOBIN  10.7*   HEMATOCRIT  34.8*   MCV  91.6   MCH  28.2   RDW  57.7*   PLATELETCT  274   MPV  11.4   NEUTSPOLYS  70.90   LYMPHOCYTES  14.60*   MONOCYTES  10.80   EOSINOPHILS  2.60   BASOPHILS  0.80     Recent Labs      07/05/17   0850   SODIUM  141   POTASSIUM  4.7   CHLORIDE  110   CO2  20   BUN  49*   CREATININE  1.40   CALCIUM  9.0   ALBUMIN  3.6     Estimated GFR/CRCL  = Estimated Creatinine Clearance: 21.8 mL/min (by C-G formula based on Cr of 1.4).  Recent Labs      17   0850   GLUCOSE  93     Recent Labs      17   0850   ASTSGOT  15   ALTSGPT  10   TBILIRUBIN  0.3   ALKPHOSPHAT  119*   GLOBULIN  3.5               Invalid input(s): [id number]  No results for input(s): INR, APTT, FIBRINOGEN in the last 72 hours.    Invalid input(s): DIMER    MICROBIOLOGY: No results    IMAGIN/5/17 Tib/Fib Xray: no evidence of acute Fx, no focal soft tissue abnormalities, diffuse soft tissue swelling      MEDS:  Current Facility-Administered Medications   Medication Last Dose   • lisinopril (PRINIVIL) 10 MG tablet 10 mg     • lovastatin (MEVACOR) tablet 20 mg 20 mg at 17   • senna-docusate (PERICOLACE or SENOKOT S) 8.6-50 MG per tablet 2 Tab 2 Tab at 17 2100    And   • polyethylene glycol/lytes (MIRALAX) PACKET 1 Packet      And   • magnesium hydroxide (MILK OF MAGNESIA) suspension 30 mL      And   • bisacodyl (DULCOLAX) suppository 10 mg     • NS infusion     • enoxaparin (LOVENOX) inj 30 mg     • ondansetron (ZOFRAN) syringe/vial injection 4 mg     • ondansetron (ZOFRAN ODT) dispertab 4 mg     • acetaminophen (TYLENOL) tablet 650 mg     • hydrocodone-acetaminophen (NORCO) 5-325 MG per tablet 1 Tab     • MD ALERT... vancomycin per pharmacy protocol         ASSESSMENT/PLAN: 89 year old female patient with worsening RLE cellulitis who failed PO Abx.    #RLE cellulitis    fall with abrasions, evaluated in ED     saw PCP, no signs of infection    [facility] urgent care for pain and erythema, given Rocephin and 250 mg Keflex TID X7 days    saw PCP who doubled dose of Keflex, 500 mg TID X10 days, completed 9 days of Keflex    increasing swelling and pain, Xray neg for fracture, Venous doppler negative for DVT, given Unasyn and Vancomycin  - 0 SIRS  -Lactic Acid: 1.3/0.9    Plan:  - Vancomycin per Pharmacy  - [place] for pain  -Blood Cx X2  pending  -cont. To monitor    #Bradycardia  -HR 40s-50s on admit  -HR 62-80 overnight  Plan:  -cont. To monitor  -transfer off Tele if remains stable    #Anemia  -Hgb 10.5/10.7/9.3  -Baseline Hgb has been 12-13  Plan:  -asymptomatic, however will get a FOBT due to recent decrease     #Chronic Conditions  HTN - cont. Home Lisinopril   - recently reduced by PCP for concern of falls, will keep the same dose  Hyperlipidemia - cont. Home Lovastatin  IBS - bowel regimen while admitted      1. PPX: Enoxaparin, no SCD  2. DISPO: Inpatient for IV Abx.    CODE STATUS: DNR

## 2017-07-06 NOTE — PROGRESS NOTES
Assumed care of patient. Bedside report received from ALEC Vogel. Updated on POC, call light within reach and fall precautions in place. Bed locked and in lowest position. Patient instructed to call for assistance before getting out of bed. All questions answered, no other needs at this time. MAR, labs, orders reviewed.

## 2017-07-06 NOTE — NON-PROVIDER
"MEDICAL STUDENT PROGRESS NOTE     Attending: Dr. Quick  Resident: Dr. Calix    PATIENT: Mila Scanlon; [id number]; 9/20/1926    ID: 89 y.o. female admitted for RLE Cellulitis    SUBJECTIVE: No acute events overnight, but patient complained of a very poor night sleep. She stated that she felt very hot in her room and her back pain kept her up most of the night. She is drinking and eating well, but is having trouble having a bowel movement and appears distraught about her current situation. She stated that her legs feel better and the redness appears \"much better\" than before starting her keflex Rx, but is unsure about any difference between last night and this morning. She only noted a slight burning sensation in her legs while walking to the restroom. She denies SOB, chest pain, n/v.    OBJECTIVE:     Filed Vitals:    07/06/17 0020 07/06/17 0414 07/06/17 0800 07/06/17 0819   BP: 171/72 139/57 146/67 140/60   Pulse: 80 66 78    Temp: 36.9 °C (98.5 °F) 36.3 °C (97.3 °F) 36.8 °C (98.2 °F)    TempSrc:       Resp: 16 18 18    Height:       Weight:       SpO2: 92% 95% 91%        Intake/Output Summary (Last 24 hours) at 07/06/17 0603  Last data filed at 07/05/17 1741   Gross per 24 hour   Intake    630 ml   Output      0 ml   Net    630 ml       PE:  General: In mild distress, resting uncomfortably in bed.  HEENT: NC/AT. PERRLA. EOMI. MMM  Cardiovascular: RRR with no M/R/G.  Respiratory: Symmetrical chest. CTAB with no W/R/R  Abdomen: soft, ND, no masses, +BS, mildly tender throughout with palpation  EXT:  HULL, No C/C/E 2+ pulses. R leg with 4 scabbed wounds below the knee anteriorly with erythema, L leg with purpleish erythema on the shin with spots of darker pigmentation throughout and no visually open wounds  Neuro: non focal with no numbness, tingling or changes in sensation. CN 2-12 grossly intact.    LABS:  Recent Labs      07/05/17   0850  07/06/17   0532   WBC  6.1  4.6*   RBC  3.80*  3.36*   HEMOGLOBIN  10.7*  9.3* " "  HEMATOCRIT  34.8*  30.9*   MCV  91.6  92.0   MCH  28.2  27.7   RDW  57.7*  57.7*   PLATELETCT  274  239   MPV  11.4  11.5   NEUTSPOLYS  70.90  67.30   LYMPHOCYTES  14.60*  16.50*   MONOCYTES  10.80  10.60   EOSINOPHILS  2.60  3.90   BASOPHILS  0.80  1.30     Recent Labs      07/05/17   0850   SODIUM  141   POTASSIUM  4.7   CHLORIDE  110   CO2  20   BUN  49*   CREATININE  1.40   CALCIUM  9.0   ALBUMIN  3.6     Estimated GFR/CRCL = Estimated Creatinine Clearance: 21.8 mL/min (by C-G formula based on Cr of 1.4).  Recent Labs      07/05/17   0850   GLUCOSE  93     Recent Labs      07/05/17   0850   ASTSGOT  15   ALTSGPT  10   TBILIRUBIN  0.3   ALKPHOSPHAT  119*   GLOBULIN  3.5       MICROBIOLOGY: N/A    IMAGING: Tib-Fib RX 7/5: \"No evidence of Fx\"    MEDS:  Current Facility-Administered Medications   Medication Last Dose   • vancomycin 1,000 mg in  mL IVPB 1,000 mg at 07/06/17 1211   • lisinopril (PRINIVIL) 10 MG tablet 10 mg 10 mg at 07/06/17 0819   • lovastatin (MEVACOR) tablet 20 mg 20 mg at 07/05/17 2051   • senna-docusate (PERICOLACE or SENOKOT S) 8.6-50 MG per tablet 2 Tab 2 Tab at 07/05/17 2100    And   • polyethylene glycol/lytes (MIRALAX) PACKET 1 Packet 1 Packet at 07/06/17 0819    And   • magnesium hydroxide (MILK OF MAGNESIA) suspension 30 mL      And   • bisacodyl (DULCOLAX) suppository 10 mg     • NS infusion     • enoxaparin (LOVENOX) inj 30 mg 30 mg at 07/06/17 0818   • ondansetron (ZOFRAN) syringe/vial injection 4 mg     • ondansetron (ZOFRAN ODT) dispertab 4 mg     • acetaminophen (TYLENOL) tablet 650 mg     • hydrocodone-acetaminophen (NORCO) 5-325 MG per tablet 1 Tab 1 Tab at 07/06/17 0726   • MD ALERT... vancomycin per pharmacy protocol         PROBLEM LIST:  1. RLE Cellulitis  2. Bradycardia  3. Anemia  4. Chronic Conditions   1. HTN   2. High Cholesterol   3. Back Pain    ASSESSMENT/PLAN: 89F with RLE cellulitis that failed outpatient PO Keflex    1. #RLE Cellulitis  1. 6/17: patient fell " and was evaluated at the ED  2. 6/25: patient [facility] to [facility]  1. Ceftriaxone Injection: 250mg  2. Keflex: PO 250mg TID x 7 days  3. 6/26: patient saw Dr. Solis; changed Rx to 500mg Keflex PO TID x 10 days  4. 7/5: patient came to ED on day 9/10 of keflex with worsening pain in RLE  5. 7/5: Tib-Fib XR showed no Fx  6. 7/5: LE Venous Duplex showed no signs of DVT or SVT in RLE  7. 7/5: Lactic Acid 1.3 -> 0.9 mmol/L  8. 7/6: WBC 4.6; within normal limits  1. Patient failed outpatient Keflex  2. IV Vancomycin for possible MRSA infection  1. Waiting for blood culture to narrow Abx spectrum  3. Monitor renal function   1. Creatinine 1.40 at baseline  2. GFR 35 at baseline  4. Monitor for SIRS  1. 0/4 current risk factors   2. Bradycardia  1. HR 40-50s at lowest  1. Continue to monitor on tele  1. Monitor for consistently improved HR for at least 24hrs before considering moving her to a different floor  3. Anemia  1. 7/6: Hb 9.3, Hct 30.9, RBC 3.36  2. Baseline Hb 12-14.1  3. Baseline Hct 37.6-44.8  4. Baseline RBC 4.12-4.69  1. Continue to monitor  2. Fecal occult blood to look for GI bleed  4. Chronic Conditions  1. HTN: lisinopril decreased to 10mg by PCP for concern of contributing to falls  1. Continue home lisinopril 10mg daily  2. High Cholesterol: Lovastatin decreased to 20mg by PCP  1. Continue home Lovastatin 20mg daily  3. Back pain  1. Pending Fecal Occult Blood test:  1. Continue Acetaminophen 650mg PRN  4. IBS: hospital regimen        PPX: Enoxaparin for DVT  DISPO: Inpatient for IV antibiotics    CODE STATUS: DNR

## 2017-07-06 NOTE — DISCHARGE PLANNING
Care Transition Team Assessment    Met with pt at bedside, pt states she will discharge home. Pt states she has [facility] [employer]  [facility]. Daughter lives 3 blocks away and pt has her for support. Pt doesn't drive but daughter provides transportation. Pt now has alert button for falls.    Information Source  Orientation : Oriented x 4  Information Given By: Patient  Informant's Name: Mila Scanlon  Who is responsible for making decisions for patient? : Patient    Readmission Evaluation  Is this a readmission?: Yes - unplanned readmission  Why do you think you were readmitted?: Celluitis  Was an appointment arranged for you prior to discharge?: Yes, attended appointment  Were there new prescriptions you were supposed to fill after you were discharged?: Yes, prescriptions filled  Did you understand your discharge instructions?: Yes  Did you have enough support after your last discharge?: Yes    Elopement Risk  Legal Hold: No  Ambulatory or Self Mobile in Wheelchair: Yes  Disoriented: No  Psychiatric Symptoms: None  History of Wandering: No  Elopement this Admit: No  Vocalizing Wanting to Leave: No  Displays Behaviors, Body Language Wanting to Leave: No-Not at Risk for Elopement  Elopement Risk: Not at Risk for Elopement    Interdisciplinary Discharge Planning  Does Admitting Nurse Feel This Could be a Complex Discharge?: No  Primary Care Physician: Dr. Pathak  Lives with - Patient's Self Care Capacity: Alone and Unable to Care For Self  Patient or legal guardian wants to designate a caregiver (see row info): Yes  Caregiver name: Elsy Cardoso  Caregiver relationship to patient: Daughter  Caregiver contact info: [phone]  Support Systems: Children, Family Member(s)  Housing / Facility: 1 Story Apartment / Condo  Do You Take your Prescribed Medications Regularly: Yes  Able to Return to Previous ADL's: Future Time w/Therapy  Mobility Issues: Yes  Prior Services: Other (Comments) ([employer]   [facility])  Patient Expects to be Discharged to:: home  Assistance Needed: Yes  Durable Medical Equipment:  (uses cane at home)    Discharge Preparedness  What is your plan after discharge?: Home with help, Other (comment) ([employer]  [facility])  What are your discharge supports?: Child, Other (comment) ([employer] )  Prior Functional Level: Ambulatory, Independent with Activities of Daily Living, Independent with Medication Management, Uses Cane, Uses Walker  Difficulity with ADLs: Walking  Difficulty with ADLs Comment: Pt uses cane, walker  Difficulity with IADLs: Driving, Shopping, Cooking, Laundry  Difficulity with IADL Comments: Pt daughters lives down the street and helps assist    Functional Assesment  Prior Functional Level: Ambulatory, Independent with Activities of Daily Living, Independent with Medication Management, Uses Cane, Uses Walker    Finances  Financial Barriers to Discharge: No  Prescription Coverage: Yes (Pharmacy: [facility])    Vision / Hearing Impairment  Vision Impairment : Yes  Right Eye Vision: Impaired, Wears Glasses  Left Eye Vision: Impaired, Wears Glasses  Hearing Impairment : Yes  Hearing Impairment: Hearing Device(s) Available, Both Ears  Does Pt Need Special Equipment for the Hearing Impaired?: No    Values / Beliefs / Concerns  Values / Beliefs Concerns : No    Advance Directive  Advance Directive?: None  Advance Directive offered?: AD Booklet refused (Pt states she has adv. dir.)    Domestic Abuse  Have you ever been the victim of abuse or violence?: No  Physical Abuse or Sexual Abuse: No  Verbal Abuse or Emotional Abuse: No  Possible Abuse Reported to:: Not Applicable    Psychological Assessment  History of Substance Abuse: None  History of Psychiatric Problems: No  Non-compliant with Treatment: No  Newly Diagnosed Illness: No    Discharge Risks or Barriers  Discharge risks or barriers?: No    Anticipated Discharge Information  Anticipated discharge  disposition: Home  Discharge Address: [address] [place] [address]  Discharge Contact Phone Number: [phone]

## 2017-07-06 NOTE — WOUND TEAM
"[facility]  Inpatient Services  Initial Wound and Skin Care Evaluation    Admission Date:   07/05/2017.  HPI, PMH, SH: Reviewed  Unit where seen by Wound Team: T8.    WOUND CONSULT RELATED TO: Bilateral lower extremities.     SUBJECTIVE:  \"I fell a couple times, now I have an alert button.\"    Self Report / Pain Level: 1/10.    OBJECTIVE: Very pleasant and engaging.     WOUND TYPE, LOCATION, CHARACTERISTICS (Pressure ulcers: location, stage, POA or date identified)    Location and type of wound: Bilateral lower extremities: Abrasions with cellulitis.        Periwound:     Erythema, discolored.  Drainage:     Scant, sanguinous.  Tissue Type and %:    100% brown crust.   Wound Edges:    Attached.  Odor:      None.  Exposed structure(s):  None.  S&S of Infection:   Erythema, warmth. Was having pain.       Measurements:   07/06/2017.  Length:     9 cm  Width:      3.2 cm  Depth:    0 cm      INTERVENTIONS BY WOUND TEAM: BLE open to air. Cleaned all wounds with saline moistened gauze. Cut honey hydrocolloid to fit over wound beds and applied. Covered these areas with non-adhesive foam and secured with hypafix tape.  Nursing to change dressings Q 72 hours.       Interdisciplinary consultation: Patient, patient's daughter, ALEC Lomeli.    EVALUATION: Honey hydrocolloid provides antimicrobial treatment, also encourages autolytic debridement of crust from wound beds which will promote healing.     Factors affecting wound healing: Age, trauma, infection.   Goals: Wounds will decrease by 1% per week.     NURSING PLAN OF CARE ORDERS (X):    Dressing changes: See Dressing Care orders: X  Skin care: See Skin Care orders:   Rectal tube care: See Rectal Tube Care orders:   Other orders:    RSKIN: CURRENT (X) ORDERED (O)  Q shift Gurwinder:  X  Q shift pressure point assessments:  X  Pressure redistribution mattress   X     JAYMIE      Bariatric JAYMIE      Bariatric foam        Heel float boots       Heels floated on pillows   "    Barrier wipes      Barrier Cream      Barrier paste      Sacral silicone dressing      Silicone O2 tubing      Anchorfast      Trach with Optifoam split foam       Waffle cushion      Rectal tube or BMS      Antifungal tx    Turn q 2 hours   X  Up to chair     Ambulate   PT/OT     Dietician      PO  X   TF   TPN     PVN    NPO   # days   Other       WOUND TEAM PLAN OF CARE (X):   NPWT change 3 x week:        Dressing changes by wound team:       Follow up as needed:  X     Other (explain):    Anticipated discharge plans (X):  SNF:           Home Care:           Outpatient Wound Center:  X          Self Care:            Other:

## 2017-07-06 NOTE — DIETARY
Nutrition: Day 1 of admit.  89 yo female admitted with cellulitis.  History of a fall 3 weeks ago and has been taking antibiotics.  She is noted to have had poor po intake with questionable amount of wt loss PTA.  Weight stable since last admit to ED on 6/17.  She is currently on a regular diet taking 50-75% of breakfast this morning and % of lunch this afternoon.  Pt to be seen daily by nutrition representative for meal and snack preferences.    Recommendations:  1) encourage intake of meals. Consider supplements if pt is consistently taking less than 50% of meals.  2) document intake of all meals as % taken in ADL's to provide interdisciplinary communication across all shifts   3) monitor daily weights

## 2017-07-06 NOTE — CARE PLAN
Problem: Safety  Goal: Will remain free from falls  Outcome: PROGRESSING AS EXPECTED  Discussed with patient the importance to use call light when assistance is needed. Patient verbalized understanding. Patient has bed alarm on, call light within reach, treaded socks on, and hourly rounding in place.     Problem: Infection  Goal: Will remain free from infection  Outcome: PROGRESSING AS EXPECTED  Patient receiving IV antibiotics. Tolerating well. No new signs of infection

## 2017-07-06 NOTE — PROGRESS NOTES
Assumed care of patient. Bedside report received from Night shift, RN. Updated on POC, call light within reach and fall precautions in place. Bed locked and in lowest position. Patient instructed to call for assistance before getting out of bed. All questions answered, no other needs at this time. MAR, labs, orders reviewed.

## 2017-07-06 NOTE — PROGRESS NOTES
"Pharmacy Kinetics 89 y.o. female on vancomycin day # 2 2017    Indication for Treatment: SSTI    Pertinent history per medical record: Admitted on 2017 for RLE cellulitis.  Patient had a fall 3 weeks ago. She came to the ED and steri strips were applied to the wound. She presented again to her PCP, at which point erythema and cellulitis was noted around the wound. She was given ceftriaxone in the office and sent home with a prescription for cephalexin, which she has been taking for 9 days. Despite this, she has persistent pain and erythema on the shin. Vancomycin initiated for presumed SSTI.     Other antibiotics: ampicillin/sulbactam 3 gm once 17    Allergies: Ciprofloxacin     List concerns for renal function: age, SCr 1.4    Pertinent cultures to date:   () blood x2 - NGTD    Recent Labs      17   0850  17   0532   WBC  6.1  4.6*   NEUTSPOLYS  70.90  67.30     Recent Labs      17   0850   BUN  49*   CREATININE  1.40   ALBUMIN  3.6     Recent Labs      17   0532   VANCORANDOM  12.8     Intake/Output Summary (Last 24 hours) at 17 1006  Last data filed at 17 1741   Gross per 24 hour   Intake    630 ml   Output      0 ml   Net    630 ml      Blood pressure 140/60, pulse 78, temperature 36.8 °C (98.2 °F), temperature source Temporal, resp. rate 18, height 1.626 m (5' 4\"), weight 51.8 kg (114 lb 3.2 oz), SpO2 91 %, not currently breastfeeding. Temp (24hrs), Av.6 °C (97.8 °F), Min:35.7 °C (96.3 °F), Max:36.9 °C (98.5 °F)    A/P   1. Vancomycin dose: 20 mg/kg q24h  2. Next vancomycin level: 17 at 1030 (ordered)  3. Goal trough: 12-16 mcg/mL  4. Comments: 20 hour level was about at goal so patient will tolerate q24h dosing. I will start 20 mg/kg but I will check a level tomorrow to assess dosing/clearance. Patient is a poor candidate for long term vancomycin given age and elevated SCr (although likely normal given her age). Recommend de-escalation as soon as " possible if MRSA can be ruled out. Pharmacy will continue to monitor and adjust dosing as clinically appropriate.    Carline Andrade, BlancaD

## 2017-07-07 LAB
ANION GAP SERPL CALC-SCNC: 10 MMOL/L (ref 0–11.9)
BUN SERPL-MCNC: 24 MG/DL (ref 8–22)
CALCIUM SERPL-MCNC: 8.7 MG/DL (ref 8.5–10.5)
CHLORIDE SERPL-SCNC: 113 MMOL/L (ref 96–112)
CO2 SERPL-SCNC: 18 MMOL/L (ref 20–33)
CREAT SERPL-MCNC: 0.98 MG/DL (ref 0.5–1.4)
ERYTHROCYTE [DISTWIDTH] IN BLOOD BY AUTOMATED COUNT: 55.3 FL (ref 35.9–50)
GFR SERPL CREATININE-BSD FRML MDRD: 53 ML/MIN/1.73 M 2
GLUCOSE SERPL-MCNC: 85 MG/DL (ref 65–99)
HCT VFR BLD AUTO: 31.9 % (ref 37–47)
HEMOCCULT STL QL: NEGATIVE
HGB BLD-MCNC: 10 G/DL (ref 12–16)
MCH RBC QN AUTO: 28.3 PG (ref 27–33)
MCHC RBC AUTO-ENTMCNC: 31.3 G/DL (ref 33.6–35)
MCV RBC AUTO: 90.4 FL (ref 81.4–97.8)
PLATELET # BLD AUTO: 251 K/UL (ref 164–446)
PMV BLD AUTO: 10.9 FL (ref 9–12.9)
POTASSIUM SERPL-SCNC: 4.1 MMOL/L (ref 3.6–5.5)
RBC # BLD AUTO: 3.53 M/UL (ref 4.2–5.4)
SODIUM SERPL-SCNC: 141 MMOL/L (ref 135–145)
VANCOMYCIN TROUGH SERPL-MCNC: 12.3 UG/ML (ref 10–20)
WBC # BLD AUTO: 5.8 K/UL (ref 4.8–10.8)

## 2017-07-07 PROCEDURE — 700105 HCHG RX REV CODE 258: Performed by: FAMILY MEDICINE

## 2017-07-07 PROCEDURE — 80202 ASSAY OF VANCOMYCIN: CPT

## 2017-07-07 PROCEDURE — 700105 HCHG RX REV CODE 258

## 2017-07-07 PROCEDURE — 700102 HCHG RX REV CODE 250 W/ 637 OVERRIDE(OP): Performed by: FAMILY MEDICINE

## 2017-07-07 PROCEDURE — 85027 COMPLETE CBC AUTOMATED: CPT

## 2017-07-07 PROCEDURE — 36415 COLL VENOUS BLD VENIPUNCTURE: CPT

## 2017-07-07 PROCEDURE — 82272 OCCULT BLD FECES 1-3 TESTS: CPT

## 2017-07-07 PROCEDURE — 700101 HCHG RX REV CODE 250: Performed by: FAMILY MEDICINE

## 2017-07-07 PROCEDURE — A9270 NON-COVERED ITEM OR SERVICE: HCPCS | Performed by: FAMILY MEDICINE

## 2017-07-07 PROCEDURE — 700111 HCHG RX REV CODE 636 W/ 250 OVERRIDE (IP)

## 2017-07-07 PROCEDURE — 80048 BASIC METABOLIC PNL TOTAL CA: CPT

## 2017-07-07 PROCEDURE — 770006 HCHG ROOM/CARE - MED/SURG/GYN SEMI*

## 2017-07-07 RX ORDER — SODIUM CHLORIDE, SODIUM LACTATE, POTASSIUM CHLORIDE, CALCIUM CHLORIDE 600; 310; 30; 20 MG/100ML; MG/100ML; MG/100ML; MG/100ML
INJECTION, SOLUTION INTRAVENOUS CONTINUOUS
Status: DISCONTINUED | OUTPATIENT
Start: 2017-07-07 | End: 2017-07-09 | Stop reason: HOSPADM

## 2017-07-07 RX ORDER — LABETALOL HYDROCHLORIDE 5 MG/ML
10 INJECTION, SOLUTION INTRAVENOUS EVERY 4 HOURS PRN
Status: DISCONTINUED | OUTPATIENT
Start: 2017-07-07 | End: 2017-07-09 | Stop reason: HOSPADM

## 2017-07-07 RX ORDER — LISINOPRIL 20 MG/1
20 TABLET ORAL DAILY
Status: DISCONTINUED | OUTPATIENT
Start: 2017-07-07 | End: 2017-07-09 | Stop reason: HOSPADM

## 2017-07-07 RX ORDER — LISINOPRIL 5 MG/1
10 TABLET ORAL ONCE
Status: COMPLETED | OUTPATIENT
Start: 2017-07-07 | End: 2017-07-07

## 2017-07-07 RX ORDER — LABETALOL HYDROCHLORIDE 5 MG/ML
10 INJECTION, SOLUTION INTRAVENOUS ONCE
Status: COMPLETED | OUTPATIENT
Start: 2017-07-07 | End: 2017-07-07

## 2017-07-07 RX ADMIN — STANDARDIZED SENNA CONCENTRATE AND DOCUSATE SODIUM 2 TABLET: 8.6; 5 TABLET, FILM COATED ORAL at 10:26

## 2017-07-07 RX ADMIN — ENOXAPARIN SODIUM 30 MG: 100 INJECTION SUBCUTANEOUS at 12:25

## 2017-07-07 RX ADMIN — VANCOMYCIN HYDROCHLORIDE 1000 MG: 100 INJECTION, POWDER, LYOPHILIZED, FOR SOLUTION INTRAVENOUS at 16:31

## 2017-07-07 RX ADMIN — SODIUM CHLORIDE, POTASSIUM CHLORIDE, SODIUM LACTATE AND CALCIUM CHLORIDE: 600; 310; 30; 20 INJECTION, SOLUTION INTRAVENOUS at 16:29

## 2017-07-07 RX ADMIN — LISINOPRIL 20 MG: 20 TABLET ORAL at 10:26

## 2017-07-07 RX ADMIN — LABETALOL HYDROCHLORIDE 10 MG: 5 INJECTION, SOLUTION INTRAVENOUS at 18:19

## 2017-07-07 RX ADMIN — LOVASTATIN 20 MG: 20 TABLET ORAL at 20:39

## 2017-07-07 RX ADMIN — SODIUM CHLORIDE: 9 INJECTION, SOLUTION INTRAVENOUS at 01:49

## 2017-07-07 RX ADMIN — LISINOPRIL 10 MG: 5 TABLET ORAL at 00:29

## 2017-07-07 ASSESSMENT — PAIN SCALES - GENERAL
PAINLEVEL_OUTOF10: 0
PAINLEVEL_OUTOF10: 3

## 2017-07-07 NOTE — PROGRESS NOTES
CNA notified RN of elevated /69. MD Weber notified. Received telephone order for lisinopril 10mg oral once. Order put in. Will administer and continue to monitor pt.

## 2017-07-07 NOTE — PROGRESS NOTES
"[facility] FAMILY MEDICINE PROGRESS NOTE     Attending: Abdelrahman    Resident: Yogi/Hamzah    PATIENT: Mila Scanlon; [id number]; 9/20/1926    ID: 89 y.o. Female admitted for worsening RLE cellulitis.    SUBJECTIVE: No acute events overnight. Patient states she \"had a bad night\", did not sleep well and woke up at night disoriented. C/o back pain, a chronic problem for her. Discussed that pain medication is available to her. Denies nausea, vomiting, chills, SOB, chest pain, leg pain. Urinating normally and had BM yesterday. Good appetite.    OBJECTIVE:     Filed Vitals:    07/06/17 2014 07/06/17 2127 07/07/17 0016 07/07/17 0414   BP: 173/76 163/63 160/69 175/71   Pulse: 72  82 68   Temp: 36.3 °C (97.3 °F)  36.2 °C (97.1 °F) 36.2 °C (97.2 °F)   TempSrc:       Resp: 16  16 16   Height:       Weight: 51.6 kg (113 lb 12.1 oz)      SpO2: 98%  95% 96%       Intake/Output Summary (Last 24 hours) at 07/07/17 0605  Last data filed at 07/06/17 2000   Gross per 24 hour   Intake    120 ml   Output      0 ml   Net    120 ml       PE:  General: No acute distress, resting comfortably in bed.  HEENT: NC/AT. EOMI. MMM  Cardiovascular: RRR with no M/R/G.  Respiratory: Symmetrical chest. CTAB with no W/R/R  Abdomen: soft, NT/ND, no masses, +BS   EXT:  HULL, No C/C/E, multiple healing contusions on L lower arm, L leg with clean and dry dressing in place, erythema still present but not past saida made yesterday just below knee, dressing in place with some brown discoloration noted  Neuro: Alert and oriented,non focal with no numbness, tingling  CN 2-12 intact    LABS:  Recent Labs      07/05/17   0850  07/06/17   0532   WBC  6.1  4.6*   RBC  3.80*  3.36*   HEMOGLOBIN  10.7*  9.3*   HEMATOCRIT  34.8*  30.9*   MCV  91.6  92.0   MCH  28.2  27.7   RDW  57.7*  57.7*   PLATELETCT  274  239   MPV  11.4  11.5   NEUTSPOLYS  70.90  67.30   LYMPHOCYTES  14.60*  16.50*   MONOCYTES  10.80  10.60   EOSINOPHILS  2.60  3.90   BASOPHILS  0.80  1.30     Recent Labs     "  17   0850   SODIUM  141   POTASSIUM  4.7   CHLORIDE  110   CO2  20   BUN  49*   CREATININE  1.40   CALCIUM  9.0   ALBUMIN  3.6     Estimated GFR/CRCL = Estimated Creatinine Clearance: 21.8 mL/min (by C-G formula based on Cr of 1.4).  Recent Labs      17   0850   GLUCOSE  93     Recent Labs      17   0850   ASTSGOT  15   ALTSGPT  10   TBILIRUBIN  0.3   ALKPHOSPHAT  119*   GLOBULIN  3.5               Invalid input(s): [id number]  No results for input(s): INR, APTT, FIBRINOGEN in the last 72 hours.    Invalid input(s): DIMER    MICROBIOLOGY:   Blood Cx X2: NGTD    IMAGIN17 Tib/Fib Xray: no evidence of acute Fx, no focal soft tissue abnormalities, diffuse soft tissue swelling    MEDS:  Current Facility-Administered Medications   Medication Last Dose   • lisinopril (PRINIVIL) tablet 20 mg     • vancomycin 1,000 mg in  mL IVPB Stopped at 17 1411   • lovastatin (MEVACOR) tablet 20 mg 20 mg at 17   • senna-docusate (PERICOLACE or SENOKOT S) 8.6-50 MG per tablet 2 Tab 2 Tab at 17    And   • polyethylene glycol/lytes (MIRALAX) PACKET 1 Packet 1 Packet at 17 08    And   • magnesium hydroxide (MILK OF MAGNESIA) suspension 30 mL      And   • bisacodyl (DULCOLAX) suppository 10 mg     • NS infusion     • enoxaparin (LOVENOX) inj 30 mg 30 mg at 17 0818   • ondansetron (ZOFRAN) syringe/vial injection 4 mg     • ondansetron (ZOFRAN ODT) dispertab 4 mg     • acetaminophen (TYLENOL) tablet 650 mg     • hydrocodone-acetaminophen (NORCO) 5-325 MG per tablet 1 Tab 1 Tab at 17 07   • MD ALERT... vancomycin per pharmacy protocol           ASSESSMENT/PLAN: 89 year old female patient with worsening RLE cellulitis who failed PO Abx.    #RLE cellulitis    fall with abrasions, evaluated in ED      saw PCP, no signs of infection    [place] [facility] for pain and erythema, given Rocephin and 250 mg Keflex TID X7 days    saw PCP who doubled  dose of Keflex, 500 mg TID X10 days, completed 9 days of Keflex   7/5 increasing swelling and pain, Xray neg for fracture, Venous doppler negative for DVT, given Unasyn and Vancomycin  - 0/4 SIRS  -Lactic Acid: 1.3/0.9  - erythema on RLE improved    Plan:  - Vancomycin per Pharmacy, Day #2  - [place]ili or tylenol for pain  -Blood Cx X2 NGTD  -cont. To monitor    #Bradycardia - RESOLVED  -HR 40s-50s on admit  -HR 61-79 overnight, wnl for >24 hours  Plan:  -cont. To monitor  -transfer off Tele     #Anemia  -Hgb 10.5/10.7/9.3/10 -Baseline Hgb has been 12-13  -asymptomatic, neg FOBT  Plan:  -repeat Hgb today back to 10, no signs of bleeding, will not cont. follow        #Chronic Conditions  HTN - cont. Home Lisinopril recently reduced by PCP for concern of falls   -BP's elevated up to 175 systolic   - will increase Lisinopril to 20 mg  Hyperlipidemia - cont. Home Lovastatin  IBS - bowel regimen while admitted      1. PPX: Enoxaparin, no SCD  2. DISPO: Inpatient, IV Abx    CODE STATUS: DNR

## 2017-07-07 NOTE — PROGRESS NOTES
S: Nursing called regarding patient -170/60-70 with HR 60-70 and asymptomatic.   O: last recorded /63 HR 72  A/P  Per last progress note, home Lisinopril 20mg daily was decreased to 10mg daily for concern about falls.   -will give 10mg lisinopril PO x 1 dose tonight with day-team to evaluate further need to increase dose vs allowing permissive HTN in the setting of cellulitis without sepsis.

## 2017-07-07 NOTE — PROGRESS NOTES
"Pharmacy Kinetics 89 y.o. female on vancomycin day #3 2017    Currently on Vancomycin 1000 mg iv q24hr (@1500)    Indication for Treatment: SSTI    Pertinent history per medical record: Admitted on 2017 for RLE cellulitis.  Patient had a fall 3 weeks ago. She came to the ED and steri strips were applied to the wound. She presented again to her PCP, at which point erythema and cellulitis was noted around the wound. She was given ceftriaxone in the office and sent home with a prescription for cephalexin, which she has been taking for 9 days. Despite this, she has persistent pain and erythema on the shin. Vancomycin initiated for presumed SSTI.     Other antibiotics: ampicillin/sulbactam 3 gm once 17    Allergies: Ciprofloxacin     List concerns for renal function: age, SCr 1.4    Pertinent cultures to date:    () blood x2 - NGTD    Recent Labs      17   0850  17   0532  17   0806   WBC  6.1  4.6*  5.8   NEUTSPOLYS  70.90  67.30   --      Recent Labs      17   0850  17   1154   BUN  49*  24*   CREATININE  1.40  0.98   ALBUMIN  3.6   --      Recent Labs      17   0532  17   1154   VANCOTROUGH   --   12.3   VANCORANDOM  12.8   --      Intake/Output Summary (Last 24 hours) at 17 1425  Last data filed at 17 2000   Gross per 24 hour   Intake    120 ml   Output      0 ml   Net    120 ml      Blood pressure 158/63, pulse 71, temperature 36.7 °C (98.1 °F), temperature source Temporal, resp. rate 16, height 1.626 m (5' 4\"), weight 51.6 kg (113 lb 12.1 oz), SpO2 99 %, not currently breastfeeding. Temp (24hrs), Av.4 °C (97.6 °F), Min:36.2 °C (97.1 °F), Max:36.7 °C (98.1 °F)    A/P   1. Next vancomycin level: 2-3 days  2. Goal trough: 12-16 mcg/mL  3. Comments: I was worried about the patient's renal function so I ordered a BMP and vanco level. SCr improved. And patient is tolerating q24h dosing as trough (drawn later) was within goal. I will continue with " current dosing. Spoke with Dr. Calix about switching the patient to an oral agent, such as doxycycline, that does not have to be renally adjusted or have concern with nephrotoxicity. She wanted to wait until cultures resulted. Currently they have no growth to date. I would recommend de-escalation to a PO agent as soon as clinically able due to the patient's many risk factors for accumulation and poor clearance, in addition to the risk of nephrotoxicity. Pharmacy will continue to monitor and adjust dosing as clinically appropriate    Carline Andrade, BlancaD

## 2017-07-07 NOTE — DISCHARGE SUMMARY
CHIEF COMPLAINT ON ADMISSION  Chief Complaint   Patient presents with   • Wound Check   • Pain     R leg wound       CODE STATUS  DNR    HPI:  This is a 89 y.o. female here with worsening Right lower extremity cellulitis. She reports falling 3 weeks ago in a gravel parking lot. She was evaluated in the emergency department (ED) and discharged after one dose of antibiotics. She had a follow up appointment with her PCP, there were no signs of infection at that time. She subsequently developed pain and swelling, presented to [place] urgent care clinic where she was given 250 mg Ceftriaxone IV and 250 mg Keflex for 10 days. She took 9/10 days of the Keflex but experienced worsening pain and swelling. She presented again to the ED and was admitted for IV antibiotics.    HOSPITAL COURSE:    1. Right lower extremity cellulitis: The patient presented to the ED with worsening cellulitis despite having been on oral antibiotics for nine days. She was given 3g IV Unasyn and a dose of Vancomycin. A lower extremity Xray did not show any fractures or focal soft tissue abnormalities and there was no evidence of DVT on venous doppler. She was admitted and started on IV Vancomycin.  She was afebrile with a normal white blood cell count throughout her stay. She continued to improve, was switched to oral antibiotics (total course 10 days) and discharged home with home health for wound care.    2. Hypertension: This is a chronic medical problem. Previous to admission, patient's PCP had decreased her home Lisinopril from 20mg to 10 mg due to history of recent falls. While admitted she had multiple episodes of elevated blood pressures with systolic pressure up to the 170's. Her home dose of Lisinopril was increased from 10mg to 20mg daily. She was discharged with instructions to continue 20 mg dose and follow up with her PCP.      Therefore, she is discharged in stable condition with close outpatient follow-up.    SPECIFIC OUTPATIENT  FOLLOW-UP  Patient has scheduled appointment with PCP, Dr. Solis on 2017.    DISCHARGE PROBLEM LIST  Cellulitis  Hypertension    MEDICATIONS ON DISCHARGE  Lisinopril 20mg PO daily  Bactrim /160mg PO BID for 6 days    DIET  Orders Placed This Encounter   Procedures   • DIET ORDER     Standing Status: Standing      Number of Occurrences: 1      Standing Expiration Date:      Order Specific Question:  Diet:     Answer:  Regular [1]       ACTIVITY  As tolerated.      LABORATORY:  Blood Cultures: no growth    IMAGIN/5/17 Right Tib/Fib Xray: There is no evidence of acute fracture involving the tibia or fibula.  There are no focal soft tissue abnormalities. There is diffuse soft tissue swelling. There are vascular calcifications. There is again probable chondrocalcinosis involving the knee joint.    17 Lower extremity venous duplex: No evidence of DVT or SVT in the right lower extremity.   No clinically significant reflux is detected in the superficial or deep    veins where imaged.

## 2017-07-07 NOTE — PROGRESS NOTES
Assumed care report received. Assessment completed. AOx4. Pt resting in bed, denies any pain at this time. No other complaints at this time. Plan of care discussed, verbalized understanding. Fall precautions in place. Call light within reach. Tele monitor in place. White board updated. Bed alarm in use. Pt talking to daughter on room telephone. NS IV fluids running at 90ml.

## 2017-07-07 NOTE — CARE PLAN
Problem: Safety  Goal: Will remain free from injury  Outcome: PROGRESSING AS EXPECTED  Pt educated about use of call light and bed alarm when getting out of bed. Call light within reach. Bed alarm in use. Pt verbalized understanding and calls appropriately. Treaded socks in place. Bed in low and locked position. Appropriate sign outside of room.         Problem: Knowledge Deficit  Goal: Knowledge of disease process/condition, treatment plan, diagnostic tests, and medications will improve  Outcome: PROGRESSING AS EXPECTED  Discussed POC with pt. Answered all questions appropriately. White board updated. All medications and interventions explained before performed. Pt verbalized understanding.

## 2017-07-07 NOTE — NON-PROVIDER
MEDICAL STUDENT PROGRESS NOTE     Attending: Dr. Quick  Resident: Dr. Calix     PATIENT: Mila Scanlon; [id number]; 9/20/1926    ID: 89 y.o. female admitted for RLE worsening cellulitis     SUBJECTIVE: No acute events overnight, but patient said she woke up in the middle of the night and cried throughout because she felt very stressed and overwhelmed. She is drinking well and eating okay, but said she felt like if she ate any more of her breakfast this morning she would be sick and it appeared like she had only had a few bites. When I walked in this morning she was in distress and stated that she had a headache and a back ache that were making her uncomfortable, so much so with the back ache that it was difficult for her to stand to use the restroom. Patient stated that she had a bowel movement this morning with no traces of blood. She stated that she feels no pain in her legs but is unsure if this is related to a distraction by her back pain or a true absence of pain in her legs. No SOB, cp, n/v.    OBJECTIVE:     Filed Vitals:    07/06/17 2127 07/07/17 0016 07/07/17 0414 07/07/17 0849   BP: 163/63 160/69 175/71 158/63   Pulse:  82 68 71   Temp:  36.2 °C (97.1 °F) 36.2 °C (97.2 °F) 36.7 °C (98.1 °F)   TempSrc:       Resp:  16 16 16   Height:       Weight:       SpO2:  95% 96% 99%       Intake/Output Summary (Last 24 hours) at 07/07/17 0506  Last data filed at 07/06/17 2000   Gross per 24 hour   Intake    120 ml   Output      0 ml   Net    120 ml       PE:  General: In moderate distress, resting uncomfortably in bed.  HEENT: NC/AT. EOMI. MMM  Cardiovascular: RRR with no M/R/G.  Respiratory: Symmetrical chest. CTAB with no W/R/R  Abdomen: soft, NT/ND, no masses, +BS   EXT:  HULL, 2+ pulses, bandages over both legs, erythema on anterior right leg receding. Scab visible under bandage appears soft, sticky, and dissolving.    Neuro: CN 2-12 grossly intact.     LABS:  Recent Labs      07/05/17   0850  07/06/17   0532   07/07/17   0806   WBC  6.1  4.6*  5.8   RBC  3.80*  3.36*  3.53*   HEMOGLOBIN  10.7*  9.3*  10.0*   HEMATOCRIT  34.8*  30.9*  31.9*   MCV  91.6  92.0  90.4   MCH  28.2  27.7  28.3   RDW  57.7*  57.7*  55.3*   PLATELETCT  274  239  251   MPV  11.4  11.5  10.9   NEUTSPOLYS  70.90  67.30   --    LYMPHOCYTES  14.60*  16.50*   --    MONOCYTES  10.80  10.60   --    EOSINOPHILS  2.60  3.90   --    BASOPHILS  0.80  1.30   --      Recent Labs      07/05/17   0850   SODIUM  141   POTASSIUM  4.7   CHLORIDE  110   CO2  20   BUN  49*   CREATININE  1.40   CALCIUM  9.0   ALBUMIN  3.6     Estimated GFR/CRCL = Estimated Creatinine Clearance: 21.8 mL/min (by C-G formula based on Cr of 1.4).  Recent Labs      07/05/17   0850   GLUCOSE  93     Recent Labs      07/05/17   0850   ASTSGOT  15   ALTSGPT  10   TBILIRUBIN  0.3   ALKPHOSPHAT  119*   GLOBULIN  3.5         LABS: Fecal Occult Blood Test negative      MEDS:  Current Facility-Administered Medications   Medication Last Dose   • lisinopril (PRINIVIL) tablet 20 mg     • vancomycin 1,000 mg in  mL IVPB Stopped at 07/06/17 1411   • lovastatin (MEVACOR) tablet 20 mg 20 mg at 07/06/17 2009   • senna-docusate (PERICOLACE or SENOKOT S) 8.6-50 MG per tablet 2 Tab 2 Tab at 07/06/17 2009    And   • polyethylene glycol/lytes (MIRALAX) PACKET 1 Packet 1 Packet at 07/06/17 0819    And   • magnesium hydroxide (MILK OF MAGNESIA) suspension 30 mL      And   • bisacodyl (DULCOLAX) suppository 10 mg     • NS infusion     • enoxaparin (LOVENOX) inj 30 mg 30 mg at 07/06/17 0818   • ondansetron (ZOFRAN) syringe/vial injection 4 mg     • ondansetron (ZOFRAN ODT) dispertab 4 mg     • acetaminophen (TYLENOL) tablet 650 mg     • hydrocodone-acetaminophen (NORCO) 5-325 MG per tablet 1 Tab 1 Tab at 07/06/17 0726   • MD ALERT... vancomycin per pharmacy protocol         PROBLEM LIST:  RLE Cellulitis  HTN  Anemia  Chronic Conditions  -HTN  -High Cholesterol  -Back Pain    ASSESSMENT/PLAN: 89F with RLE  cellulitis that failed outpatient PO Keflex      1. RLE Cellulitis  1. 6/17: patient fell and was evaluated at the ED  2. 6/25: patient went to [facility]  1. Ceftriaxone Injection: 250mg  2. Keflex: PO 250mg TID x 7 days  3. 6/26: patient saw Dr. Solis; changed Rx to 500mg Keflex PO TID x 10 days  4. 7/5: patient came to ED on day 9/10 of keflex with worsening pain in RLE  5. 7/5: Tib-Fib XR showed no Fx  6. 7/5: LE Venous Duplex showed no signs of DVT or SVT in RLE  7. 7/5: Lactic Acid 1.3 -> 0.9 mmol/L  8. 7/6: WBC 4.6; within normal limits  1. Patient failed outpatient Keflex  2. IV Vancomycin for possible MRSA infection  1. Waiting for blood culture to narrow Abx spectrum  2. If patient is improving, consider discharging in the next day or two on oral Abx (Clindamycin, Bactrim, or a tetracycline)  3. Monitor renal function    1. Creatinine 1.40 at baseline  2. GFR 35 at baseline  4. Monitor for SIRS  1. 0/4 current risk factors      3. Anemia  1. 7/7: Hb 9.3-> 10.0, Hct 30.9->31.9, RBC 3.36->3.53  2. Baseline Hb 12-14.1  3. Baseline Hct 37.6-44.8  4. Baseline RBC 4.12-4.69  5. Fecal occult blood test negative  1. Patient is asymptomatic  2. CBC this morning shows upward trend  3. Continue to monitor    2. Bradycardia  1. Resolved  1. HR 61-82 for last 24hrs with low of 61 at 1600 7/6  2. Transfer off of the telemetry floor today    4. Chronic Conditions  1. HTN:   1. 7/6: Lisinopril decreased to 10mg by PCP for concern of contributing to falls  1. Hypertensive on that dose  1. Increase Lisinopril to 20mg daily and re-evaluate dosage before patient discharge  2. BP may be elevated due to increased stress  3. Continue to monitor    2. High Cholesterol: Lovastatin decreased to 20mg by PCP  1. Continue home Lovastatin 20mg daily    3. Back pain  1. Fecal Occult Blood test Negative:  1. Continue Acetaminophen 650mg PRN    4. IBS: hospital regimen    1. PPX: Enoxaparin for DVT  2. DISPO: Inpatient for IV  antibiotics    CODE STATUS: DNR

## 2017-07-07 NOTE — PROGRESS NOTES
Skin check done. Pt has bilateral cellulitis on lower legs, wound care on board, wounds dressed. Scabbing on left upper extremity and bruising. Bruising on right upper extremity. Nonblanching redness on left heel. Scab on right heel. Heels floated.

## 2017-07-07 NOTE — PROGRESS NOTES
SENIOR NOTE    ID: 89 yo F with h/o HTN, HLD admitted for RLE cellulitis.    S: She states she is feeling well and that her pain is much better controlled. She denies f/c, cp, sob.    VS:  Afebrile  Hypertensive up to SBP 170s  Otherwise stable, on RA    PE:   GEN: WD/WN older woman lying comfortably in bed, NAD  HEENT: NC/AT, MMM  CV: RRR, no m/r/g  Pulm: CTAB, no w/r/r  Ext: 2+ pedal pulses, bandages over both legs, erythema of R shin receding     Plan:  1. RLE Cellulitis  - failed outpatient treatment  - non-septic on admission   - vancomycin per pharmacy protocol   - pain control   - IVF at maintenance   - consider transitioning to PO abxn either today or tomorrow    2. Anemia  - baseline Hb 12-13  - Hb 10.7 --> 9.3  - FOBT neg   - CBC this AM    3. Bradycardia  - resolved   - transfer off tele today    4. HTN  - per patient and outpt chart, lisinopril decreased by 1/2 for concern of falls  - hypertensive on that dosing   - increase lisinopril to 20mg daily    Dispo: inpatient, IV abx for cellulitis, transfer off tele

## 2017-07-07 NOTE — PROGRESS NOTES
Report received from RN, pt returning back to bed from the restroom. Pt was tearful and expressed frustration about not receiving more details from her plan of care. Assured patient that her future POC will be discussed in detail so that she understands. Call light in reach and pt educated to call for any questions.

## 2017-07-08 LAB
ANION GAP SERPL CALC-SCNC: 8 MMOL/L (ref 0–11.9)
BUN SERPL-MCNC: 19 MG/DL (ref 8–22)
CALCIUM SERPL-MCNC: 8.6 MG/DL (ref 8.5–10.5)
CHLORIDE SERPL-SCNC: 113 MMOL/L (ref 96–112)
CO2 SERPL-SCNC: 21 MMOL/L (ref 20–33)
CREAT SERPL-MCNC: 0.92 MG/DL (ref 0.5–1.4)
GFR SERPL CREATININE-BSD FRML MDRD: 57 ML/MIN/1.73 M 2
GLUCOSE SERPL-MCNC: 91 MG/DL (ref 65–99)
POTASSIUM SERPL-SCNC: 3.8 MMOL/L (ref 3.6–5.5)
SODIUM SERPL-SCNC: 142 MMOL/L (ref 135–145)

## 2017-07-08 PROCEDURE — 700111 HCHG RX REV CODE 636 W/ 250 OVERRIDE (IP): Performed by: FAMILY MEDICINE

## 2017-07-08 PROCEDURE — 36415 COLL VENOUS BLD VENIPUNCTURE: CPT

## 2017-07-08 PROCEDURE — 700102 HCHG RX REV CODE 250 W/ 637 OVERRIDE(OP): Performed by: FAMILY MEDICINE

## 2017-07-08 PROCEDURE — 80048 BASIC METABOLIC PNL TOTAL CA: CPT

## 2017-07-08 PROCEDURE — 770006 HCHG ROOM/CARE - MED/SURG/GYN SEMI*

## 2017-07-08 PROCEDURE — A9270 NON-COVERED ITEM OR SERVICE: HCPCS | Performed by: FAMILY MEDICINE

## 2017-07-08 PROCEDURE — 700105 HCHG RX REV CODE 258: Performed by: FAMILY MEDICINE

## 2017-07-08 RX ORDER — SULFAMETHOXAZOLE AND TRIMETHOPRIM 800; 160 MG/1; MG/1
1 TABLET ORAL EVERY 12 HOURS
Status: DISCONTINUED | OUTPATIENT
Start: 2017-07-08 | End: 2017-07-09 | Stop reason: HOSPADM

## 2017-07-08 RX ADMIN — SODIUM CHLORIDE, POTASSIUM CHLORIDE, SODIUM LACTATE AND CALCIUM CHLORIDE: 600; 310; 30; 20 INJECTION, SOLUTION INTRAVENOUS at 21:30

## 2017-07-08 RX ADMIN — SULFAMETHOXAZOLE AND TRIMETHOPRIM 1 TABLET: 800; 160 TABLET ORAL at 21:27

## 2017-07-08 RX ADMIN — LISINOPRIL 20 MG: 20 TABLET ORAL at 09:41

## 2017-07-08 RX ADMIN — LOVASTATIN 20 MG: 20 TABLET ORAL at 21:28

## 2017-07-08 RX ADMIN — ENOXAPARIN SODIUM 30 MG: 100 INJECTION SUBCUTANEOUS at 09:41

## 2017-07-08 RX ADMIN — SULFAMETHOXAZOLE AND TRIMETHOPRIM 1 TABLET: 800; 160 TABLET ORAL at 14:44

## 2017-07-08 RX ADMIN — SODIUM CHLORIDE, POTASSIUM CHLORIDE, SODIUM LACTATE AND CALCIUM CHLORIDE: 600; 310; 30; 20 INJECTION, SOLUTION INTRAVENOUS at 11:28

## 2017-07-08 ASSESSMENT — PAIN SCALES - GENERAL
PAINLEVEL_OUTOF10: 0

## 2017-07-08 NOTE — PROGRESS NOTES
Pictures taken of bilateral heels. Float boots provided. WND consult ordered for heel assessment need.

## 2017-07-08 NOTE — WOUND TEAM
Wound Team in to assess bilateral heels. Left heel intact. Right posterior heel has an abrasion. No pressure injuries on heels. Changed BLE lower, anterior, dressings. Wounds have improved. Updated dressing care order.

## 2017-07-08 NOTE — NON-PROVIDER
"MEDICAL STUDENT PROGRESS NOTE     Attending: Dr. Gastelum  Resident: Dr. Calix    PATIENT: Mila Scanlon; [id number]; 9/20/1926    ID: 89 y.o. female admitted for RLE Cellulitis    SUBJECTIVE: Wound care changed her bandages yesterday and patient states that the scabs appeared as if they had \"fallen off.\" Patient states that she feels much better today and had a good night sleep with less back pain than previous nights. She is eating better and drinking well, but says she has to get up multiple times during the night to use the restroom. She says she feels no pain in her legs while laying or walking. No SOB, cp, n/v.      OBJECTIVE:     Filed Vitals:    07/07/17 1735 07/07/17 1945 07/08/17 0100 07/08/17 0432   BP: 179/62 134/64  144/79   Pulse: 77 83  76   Temp: 36.9 °C (98.5 °F) 36.9 °C (98.5 °F)  36.8 °C (98.2 °F)   TempSrc:       Resp: 16 18  18   Height:       Weight:   53 kg (116 lb 13.5 oz)    SpO2: 97% 92%  92%     No intake or output data in the 24 hours ending 07/08/17 0507    PE:  General: No acute distress, resting comfortably in bed.  HEENT: NC/AT. EOMI. MMM  Cardiovascular: RRR with no M/R/G.  Respiratory: Symmetrical chest. CTAB with no W/R/R  Abdomen: soft, NT/ND, no masses, +BS    EXT:  HULL, 2+ pulses, new bandages over both legs.   Neuro: CN 2-12 grossly intact.     LABS:  Recent Labs      07/05/17   0850  07/06/17   0532  07/07/17   0806   WBC  6.1  4.6*  5.8   RBC  3.80*  3.36*  3.53*   HEMOGLOBIN  10.7*  9.3*  10.0*   HEMATOCRIT  34.8*  30.9*  31.9*   MCV  91.6  92.0  90.4   MCH  28.2  27.7  28.3   RDW  57.7*  57.7*  55.3*   PLATELETCT  274  239  251   MPV  11.4  11.5  10.9   NEUTSPOLYS  70.90  67.30   --    LYMPHOCYTES  14.60*  16.50*   --    MONOCYTES  10.80  10.60   --    EOSINOPHILS  2.60  3.90   --    BASOPHILS  0.80  1.30   --      Recent Labs      07/05/17   0850  07/07/17   1154  07/08/17   0402   SODIUM  141  141  142   POTASSIUM  4.7  4.1  3.8   CHLORIDE  110  113*  113*   CO2  20  18*  21 "   BUN  49*  24*  19   CREATININE  1.40  0.98  0.92   CALCIUM  9.0  8.7  8.6   ALBUMIN  3.6   --    --      Estimated GFR/CRCL = Estimated Creatinine Clearance: 34 mL/min (by C-G formula based on Cr of 0.92).  Recent Labs      07/05/17   0850  07/07/17   1154  07/08/17   0402   GLUCOSE  93  85  91     Recent Labs      07/05/17   0850   ASTSGOT  15   ALTSGPT  10   TBILIRUBIN  0.3   ALKPHOSPHAT  119*   GLOBULIN  3.5       MEDS:  Current Facility-Administered Medications   Medication Last Dose   • lisinopril (PRINIVIL) tablet 20 mg 20 mg at 07/07/17 1026   • vancomycin 1,000 mg in  mL IVPB Stopped at 07/07/17 1831   • lactated ringers infusion     • labetalol (NORMODYNE,TRANDATE) injection 10 mg     • lovastatin (MEVACOR) tablet 20 mg 20 mg at 07/07/17 2039   • senna-docusate (PERICOLACE or SENOKOT S) 8.6-50 MG per tablet 2 Tab Stopped at 07/07/17 2040    And   • polyethylene glycol/lytes (MIRALAX) PACKET 1 Packet 1 Packet at 07/06/17 0819    And   • magnesium hydroxide (MILK OF MAGNESIA) suspension 30 mL      And   • bisacodyl (DULCOLAX) suppository 10 mg     • enoxaparin (LOVENOX) inj 30 mg Stopped at 07/07/17 0900   • ondansetron (ZOFRAN) syringe/vial injection 4 mg     • ondansetron (ZOFRAN ODT) dispertab 4 mg     • acetaminophen (TYLENOL) tablet 650 mg     • hydrocodone-acetaminophen (NORCO) 5-325 MG per tablet 1 Tab 1 Tab at 07/06/17 0726   • MD ALERT... vancomycin per pharmacy protocol         PROBLEM LIST:  RLE Cellulitis  HTN  Anemia  Chronic Conditions  -HTN  -High Cholesterol  -Back Pain    ASSESSMENT/PLAN: 89F with RLE cellulitis that failed outpatient PO Keflex      1. RLE Cellulitis  1. 6/17: patient fell and was evaluated at the ED  2. 6/25: patient went to [facility]  1. Ceftriaxone Injection: 250mg  2. Keflex: PO 250mg TID x 7 days  3. 6/26: patient saw Dr. Solis; changed Rx to 500mg Keflex PO TID x 10 days  4. 7/5: patient came to ED on day 9/10 of keflex with worsening pain in  RLE  5. 7/5: Tib-Fib XR showed no Fx  6. 7/5: LE Venous Duplex showed no signs of DVT or SVT in RLE  7. 7/5: Lactic Acid 1.3 -> 0.9 mmol/L  8. 7/6: WBC 4.6; within normal limits  1. Patient failed outpatient Keflex  2. IV Vancomycin for possible MRSA infection  1. Waiting for blood culture to narrow Abx spectrum  2. If patient is improving, consider discharging in the next day or two on oral Abx (Clindamycin, Bactrim, or a tetracycline)  1. Pharmacy recommended doxycycline for decreased risk of nephrotoxicity  3. Monitor renal function    1. Creatinine 1.40 at baseline  1. 7/8: Creatinine at 0.92  2. GFR 35 at baseline  1. 7/8: GFR at 53   4. Monitor for SIRS  1. 0/4 current risk factors        2.   Anemia  1. 7/7: Hb 9.3-> 10.0, Hct 30.9->31.9, RBC 3.36->3.53  2. Baseline Hb 12-14.1  3. Baseline Hct 37.6-44.8  4. Baseline RBC 4.12-4.69  5. Fecal occult blood test negative  1. Patient is asymptomatic  2. CBC on 7/7 shows upward trend  3. Continue to monitor  4. Consider another CBC before discharging patient          3.   Chronic Conditions  1. HTN:    1. 7/6: Lisinopril decreased to 10mg by PCP for concern of contributing to falls  1. Hypertensive on that dose  2. 7/7: Lisinopril increased to 20mg to control HTN this AM  3. 7/7: Patient's /62 @ 1735; given 10mg Labetolol  4. BP may be elevated due to increased stress  5. Patient's BP tends to spike at night  1. Continue patient on Lisinopril 20mg and continue to monitor  2. Consider Lisinopril BID       2. High Cholesterol: Lovastatin decreased to 20mg by PCP  1. Continue home Lovastatin 20mg daily      3. Back pain  1. Fecal Occult Blood test Negative:  1. Continue Acetaminophen 650mg PRN      4. IBS: hospital regimen      1. PPX: Enoxaparin for DVT  2. DISPO: Inpatient for IV antibiotics    CODE STATUS: DNR

## 2017-07-08 NOTE — PROGRESS NOTES
Pt /62. Pt has no PRNs for elevated BP. RN paged [facility]. Received orders from Dr. Weber to give one time dose of 10mg IV Labetalol.

## 2017-07-08 NOTE — CARE PLAN
Problem: Venous Thromboembolism (VTW)/Deep Vein Thrombosis (DVT) Prevention:  Goal: Patient will participate in Venous Thrombosis (VTE)/Deep Vein Thrombosis (DVT)Prevention Measures  Outcome: PROGRESSING AS EXPECTED  Pt educated on DVT prophylaxis. Pt understands importance and complying with taking lovenox shots.     Problem: Skin Integrity  Goal: Risk for impaired skin integrity will decrease  Outcome: PROGRESSING AS EXPECTED  Pt educated on turning throughout shift. Pt's heels floated with pillows, pt refused float heel boots. Rody XIONG.

## 2017-07-08 NOTE — PROGRESS NOTES
Report received from tele RN. Pt arrived on unit. Skin check completed. Pt states pain of 3/10, well controlled. Drsings intact. Pt oriented to room. All questions answered. BA on. Pt understands plan, denies any needs at this time.

## 2017-07-08 NOTE — PROGRESS NOTES
Patient AO x 4 and very pleasant, ambulated to bathroom well with x 1 assistance and single point cane. Dressings on lower legs changed, some slough noted on right anterior shin wound, which was easily removed with NS and gauze. No complaints at this time. Hourly rounding in place.

## 2017-07-08 NOTE — CARE PLAN
Problem: Safety  Goal: Will remain free from falls  Outcome: PROGRESSING AS EXPECTED  Bed alarm in use.     Problem: Skin Integrity  Goal: Risk for impaired skin integrity will decrease  Outcome: PROGRESSING AS EXPECTED  Changed lower leg dressings.

## 2017-07-08 NOTE — PROGRESS NOTES
"Pharmacy Kinetics 89 y.o. female on vancomycin day # 4 2017    Currently on Vancomycin 1000 mg iv q24hr (1630)    Indication for Treatment: SSTI - RLE cellulitis    Pertinent history per medical record: Admitted on 2017 for RLE cellulitis.  Patient had a fall 3 weeks ago. She came to the ED and steri strips were applied to the wound.  She presented again to her PCP, at which point erythema and cellulitis was noted around the wound.  She was given ceftriaxone in the office and sent home with a prescription for cephalexin, which she has been taking for 9 days.  Despite this, she has had persistent pain and erythema on the shin.  Vancomycin initiated for presumed SSTI.     Other antibiotics: none    Allergies: Ciprofloxacin     List concerns for renal function: advanced age    Pertinent cultures to date:   none    Recent Labs      17   0532  17   0806   WBC  4.6*  5.8   NEUTSPOLYS  67.30   --      Recent Labs      17   1154  17   0402   BUN  24*  19   CREATININE  0.98  0.92     Recent Labs      17   0532  17   1154   VANCOTROUGH   --   12.3   VANCORANDOM  12.8   --      Intake/Output Summary (Last 24 hours) at 17 1222  Last data filed at 17 1121   Gross per 24 hour   Intake   1100 ml   Output      0 ml   Net   1100 ml      Blood pressure 128/65, pulse 76, temperature 36.6 °C (97.8 °F), temperature source Temporal, resp. rate 18, height 1.626 m (5' 4\"), weight 53 kg (116 lb 13.5 oz), SpO2 97 %, not currently breastfeeding. Temp (24hrs), Av.8 °C (98.3 °F), Min:36.6 °C (97.8 °F), Max:36.9 °C (98.5 °F)      A/P   1. Vancomycin dose change: not indicated  2. Next vancomycin level: 1-2 days  3. Goal trough: 12-16 mcg/ml  4. Comments: SCr has improved to 0.92 from 1.4 on admit.  Age is now the only concern for renal function.  Level 2 days ago at goal.  Will continue current dosing and plan to check a level in the next day or two.  Pharmacy will continue to monitor " and adjust dosing if needed.      Teresa Saldana, PharmD

## 2017-07-09 ENCOUNTER — HOME HEALTH ADMISSION (OUTPATIENT)
Dept: HOME HEALTH SERVICES | Facility: HOME HEALTHCARE | Age: 82
End: 2017-07-09
Payer: MEDICARE

## 2017-07-09 VITALS
OXYGEN SATURATION: 94 % | HEIGHT: 64 IN | BODY MASS INDEX: 19.95 KG/M2 | SYSTOLIC BLOOD PRESSURE: 116 MMHG | RESPIRATION RATE: 20 BRPM | DIASTOLIC BLOOD PRESSURE: 53 MMHG | WEIGHT: 116.84 LBS | HEART RATE: 79 BPM | TEMPERATURE: 99.6 F

## 2017-07-09 PROCEDURE — 700111 HCHG RX REV CODE 636 W/ 250 OVERRIDE (IP): Performed by: FAMILY MEDICINE

## 2017-07-09 PROCEDURE — 700102 HCHG RX REV CODE 250 W/ 637 OVERRIDE(OP): Performed by: FAMILY MEDICINE

## 2017-07-09 PROCEDURE — A9270 NON-COVERED ITEM OR SERVICE: HCPCS | Performed by: FAMILY MEDICINE

## 2017-07-09 RX ORDER — SULFAMETHOXAZOLE AND TRIMETHOPRIM 800; 160 MG/1; MG/1
1 TABLET ORAL EVERY 12 HOURS
Qty: 12 TAB | Refills: 0 | Status: SHIPPED | OUTPATIENT
Start: 2017-07-09 | End: 2017-07-15

## 2017-07-09 RX ADMIN — ENOXAPARIN SODIUM 30 MG: 100 INJECTION SUBCUTANEOUS at 09:10

## 2017-07-09 RX ADMIN — LISINOPRIL 20 MG: 20 TABLET ORAL at 09:10

## 2017-07-09 RX ADMIN — SULFAMETHOXAZOLE AND TRIMETHOPRIM 1 TABLET: 800; 160 TABLET ORAL at 09:10

## 2017-07-09 ASSESSMENT — PAIN SCALES - GENERAL
PAINLEVEL_OUTOF10: 0
PAINLEVEL_OUTOF10: 0

## 2017-07-09 NOTE — DISCHARGE PLANNING
Received choice form from [facility] [place]. Referral sent to [facility] at 1152.

## 2017-07-09 NOTE — FACE TO FACE
Face to Face Supporting Documentation - Home Health    The encounter with this patient was in whole or in part the primary reason for home health admission.    Date of encounter:   Patient:                    MRN:                       YOB: 2017  Mila cSanlon  [id number]  9/20/1926     Home health to see patient for:  Wound Care and Home health aide    Skilled need for:  New Onset Medical Diagnosis Cellulitis    Skilled nursing interventions to include:  Wound Care    Homebound status evidenced by:  Need the aid of supportive devices such as crutches, canes, wheelchairs or walkers or Needs the assistance of another person in order to leave the home. Leaving home requires a considerable and taxing effort. There is a normal inability to leave the home.    Community Physician to provide follow up care: Umberto Solis M.D.     Optional Interventions? No      I certify the face to face encounter for this home health care referral meets the CMS requirements and the encounter/clinical assessment with the patient was, in whole, or in part, for the medical condition(s) listed above, which is the primary reason for home health care. Based on my clinical findings: the service(s) are medically necessary, support the need for home health care, and the homebound criteria are met.  I certify that this patient has had a face to face encounter by myself.  Patsy Calix M.D. - NPI: [id number]

## 2017-07-09 NOTE — CARE PLAN
Problem: Knowledge Deficit  Goal: Knowledge of disease process/condition, treatment plan, diagnostic tests, and medications will improve  POC: PO ABX    Problem: Discharge Barriers/Planning  Goal: Patient’s continuum of care needs will be met  Pt daughter states she is unable to care for the pt anymore. Daughter would like the pt to go to an assisted living.

## 2017-07-09 NOTE — DISCHARGE PLANNING
Medical Social Work  Informed by patient's , she will need wound care, and will put in a h/h order for this.  Also requested I provide list of group homes for patient's daughter.    Talked to patient and her daughter, Elsy.  [facility], Choice faxed to [facility]. Also provided list of local group homes.

## 2017-07-09 NOTE — PROGRESS NOTES
Pt D/C'd. PIV removed. Discharge instructions provided to pt. and pt daughter.  Pt and pt daughter states understanding.  Pt and daughter states all questions have been answered.  Copy of discharge provided to pt and pt daughter.  Signed copy in chart.  Prescriptions provided to pt. Pt states that all personal belongings are in possession.

## 2017-07-09 NOTE — PROGRESS NOTES
Report received at start of shift. Pt assessed. Pt has bilateral dressings in place, changed by night RN. WND care in to assess heels, drsings also changed by wound care. Bed alarm on, pt assisted up to bathroom as needed. Family at bedside. Per MD IV antibiotics switched to oral. Pt denies any needs at this time. Hourly rounding in place.

## 2017-07-09 NOTE — PROGRESS NOTES
Patient AO x 4 and pleasant, somewhat unsteady while ambulating, x 1 hand held assist with one point cane. Some flank pain tonight. Abdomin leah firm, no nausea at this time. Hourly rounding in place.

## 2017-07-09 NOTE — CARE PLAN
Problem: Communication  Goal: The ability to communicate needs accurately and effectively will improve  Outcome: PROGRESSING AS EXPECTED  1:1 discussion with patient.     Problem: Mobility  Goal: Risk for activity intolerance will decrease  Outcome: PROGRESSING AS EXPECTED  Ambulated patient to bathroom.

## 2017-07-09 NOTE — PROGRESS NOTES
"[facility] FAMILY MEDICINE PROGRESS NOTE     Attending: Nirav    Resident: Yogi/Hamzah    PATIENT: Mila Scanlon; [id number]; 9/20/1926    ID: 89 y.o. Female admitted for worsening RLE cellulitis.    SUBJECTIVE: No acute events overnight. Patient states she did not sleep well due to \"gas pains\", feels well this AM. Denies nausea, vomiting, chills, SOB, chest pain. Urinating and stooling normally, normal appetite. Does state that she feels nervous to go home by herself.     OBJECTIVE:     Filed Vitals:    07/08/17 1557 07/08/17 1905 07/09/17 0110 07/09/17 0300   BP: 146/63 178/77 150/72 140/62   Pulse: 72 85 83 76   Temp: 36.7 °C (98.1 °F) 37.2 °C (99 °F) 37.2 °C (99 °F) 37.1 °C (98.7 °F)   TempSrc:       Resp: 19 16 18 18   Height:       Weight:       SpO2: 95% 95% 96% 94%       Intake/Output Summary (Last 24 hours) at 07/09/17 0515  Last data filed at 07/08/17 1800   Gross per 24 hour   Intake   1236 ml   Output      0 ml   Net   1236 ml       PE:  General: No acute distress, sitting up in bed.  HEENT: NC/AT. EOMI. MMM  Cardiovascular: RRR with no M/R/G.  Respiratory: Symmetrical chest. CTAB with no W/R/R  Abdomen: soft, NT/ND, no masses, +BS   EXT:  HULL, multiple healing contusions on L lower arm, L leg with clean and dry dressing in place, erythema decreased, dressing in place with some brown discoloration noted  Neuro: non focal with no numbness, tingling or changes in sensation, CN 2-12 grossly intact    LABS:  Recent Labs      07/06/17   0532  07/07/17   0806   WBC  4.6*  5.8   RBC  3.36*  3.53*   HEMOGLOBIN  9.3*  10.0*   HEMATOCRIT  30.9*  31.9*   MCV  92.0  90.4   MCH  27.7  28.3   RDW  57.7*  55.3*   PLATELETCT  239  251   MPV  11.5  10.9   NEUTSPOLYS  67.30   --    LYMPHOCYTES  16.50*   --    MONOCYTES  10.60   --    EOSINOPHILS  3.90   --    BASOPHILS  1.30   --      Recent Labs      07/07/17   1154  07/08/17   0402   SODIUM  141  142   POTASSIUM  4.1  3.8   CHLORIDE  113*  113*   CO2  18*  21   BUN  24*  19 "   CREATININE  0.98  0.92   CALCIUM  8.7  8.6     Estimated GFR/CRCL = Estimated Creatinine Clearance: 34 mL/min (by C-G formula based on Cr of 0.92).  Recent Labs      17   1154  17   0402   GLUCOSE  85  91                   Invalid input(s): [id number]  No results for input(s): INR, APTT, FIBRINOGEN in the last 72 hours.    Invalid input(s): DIMER    MICROBIOLOGY:  17 Blood Cx X2: NGTD, Day #5    IMAGIN17 Tib/Fib Xray: no evidence of acute Fx, no focal soft tissue abnormalities, diffuse soft tissue swelling    MEDS:  Current Facility-Administered Medications   Medication Last Dose   • sulfamethoxazole-trimethoprim (BACTRIM DS) 800-160 MG tablet 1 Tab 1 Tab at 17   • lisinopril (PRINIVIL) tablet 20 mg 20 mg at 17   • lactated ringers infusion     • labetalol (NORMODYNE,TRANDATE) injection 10 mg     • lovastatin (MEVACOR) tablet 20 mg 20 mg at 17   • senna-docusate (PERICOLACE or SENOKOT S) 8.6-50 MG per tablet 2 Tab Stopped at 17    And   • polyethylene glycol/lytes (MIRALAX) PACKET 1 Packet 1 Packet at 17    And   • magnesium hydroxide (MILK OF MAGNESIA) suspension 30 mL      And   • bisacodyl (DULCOLAX) suppository 10 mg     • enoxaparin (LOVENOX) inj 30 mg 30 mg at 17   • ondansetron (ZOFRAN) syringe/vial injection 4 mg     • ondansetron (ZOFRAN ODT) dispertab 4 mg     • acetaminophen (TYLENOL) tablet 650 mg     • hydrocodone-acetaminophen (NORCO) 5-325 MG per tablet 1 Tab 1 Tab at 17       ASSESSMENT/PLAN:  #RLE cellulitis    fall with abrasions, evaluated in ED      saw PCP, no signs of infection    [facility] for pain and erythema, given Rocephin and 250 mg Keflex TID X7 days    saw PCP who doubled dose of Keflex, 500 mg TID X10 days, completed 9 days of Keflex   7/5 increasing swelling and pain, Xray neg for fracture, Venous doppler negative for DVT, given Unasyn and  Vancomycin  - 0/4 SIRS  -Lactic Acid: 1.3/0.9  - erythema on RLE resolving  - switched from IV Vanco to Bactrim    Plan:  - cont. Bactrim  - Norco or tylenol for pain  - Blood Cx X2 NGTD, day #5 of neg cultures  -probable discharge today, will follow up with PCP on 7/11    #Bradycardia - RESOLVED  -HR 40s-50s on admit  -HR's normalized and have remained wnl      #Anemia  -Hgb 10.5/10.7/9.3/10 -Baseline Hgb has been 12-13  -asymptomatic, neg FOBT  Plan:  - no signs of bleeding, asymptomatic,will not cont. Follow  - follow up with PCP        #Chronic Conditions  HTN - cont. Home Lisinopril recently reduced by PCP for concern of falls              -BP's elevated up to 175 systolic              -  Lisinopril increased to 20 mg, up from home dose of 10 mg   - BP again elevated to 178 systolic overnight   - will monitor today before making changes to regimen   - will need to follow up with PCP  Hyperlipidemia - cont. Home Lovastatin  IBS - bowel regimen while admitted      1. PPX: Lovenox  2. DISPO: Medically stable, Probable discharge today    CODE STATUS: DNR

## 2017-07-09 NOTE — DISCHARGE INSTRUCTIONS
Discharge Instructions    Discharged to home by car with relative. Discharged via wheelchair, hospital escort: Yes.  Special equipment needed: Not Applicable    Be sure to schedule a follow-up appointment with your primary care doctor or any specialists as instructed.     Discharge Plan:   Diet Plan: Discussed  Activity Level: Discussed  Smoking Cessation Offered: Patient Refused  Confirmed Follow up Appointment: Patient to Call and Schedule Appointment  Confirmed Symptoms Management: Discussed  Medication Reconciliation Updated: Yes  Influenza Vaccine Indication: Not indicated: Previously immunized this influenza season and > 8 years of age    I understand that a diet low in cholesterol, fat, and sodium is recommended for good health. Unless I have been given specific instructions below for another diet, I accept this instruction as my diet prescription.   Other diet: regular    Special Instructions: None    · Is patient discharged on Warfarin / Coumadin?   No     · Is patient Post Blood Transfusion?  No    Depression / Suicide Risk    As you are discharged from this [facility] facility, it is important to learn how to keep safe from harming yourself.    Recognize the warning signs:  · Abrupt changes in personality, positive or negative- including increase in energy   · Giving away possessions  · Change in eating patterns- significant weight changes-  positive or negative  · Change in sleeping patterns- unable to sleep or sleeping all the time   · Unwillingness or inability to communicate  · Depression  · Unusual sadness, discouragement and loneliness  · Talk of wanting to die  · Neglect of personal appearance   · Rebelliousness- reckless behavior  · Withdrawal from people/activities they love  · Confusion- inability to concentrate     If you or a loved one observes any of these behaviors or has concerns about self-harm, here's what you can do:  · Talk about it- your feelings and reasons for harming  yourself  · Remove any means that you might use to hurt yourself (examples: pills, rope, extension cords, firearm)  · Get professional help from the community (Mental Health, Substance Abuse, psychological counseling)  · Do not be alone:Call your Safe Contact- someone whom you trust who will be there for you.  · Call your local CRISIS HOTLINE 754-8875 or 816-539-2679  · Call your local Children's Mobile Crisis Response Team [place] (432) 198-5230 or wwwAuctionata  · Call the toll free National Suicide Prevention Hotlines   · National Suicide Prevention Lifeline 469-909-TCPL (8996)  · National Hope Line Network 800-SUICIDE (691-5316)        Cellulitis  Cellulitis is an infection of the skin and the tissue under the skin. The infected area is usually red and tender. This happens most often in the arms and lower legs.  HOME CARE   · Take your antibiotic medicine as told. Finish the medicine even if you start to feel better.  · Keep the infected arm or leg raised (elevated).  · Put a warm cloth on the area up to 4 times per day.  · Only take medicines as told by your doctor.  · Keep all doctor visits as told.  GET HELP IF:  2. You see red streaks on the skin coming from the infected area.  3. Your red area gets bigger or turns a dark color.  4. Your bone or joint under the infected area is painful after the skin heals.  5. Your infection comes back in the same area or different area.  6. You have a puffy (swollen) bump in the infected area.  7. You have new symptoms.  8. You have a fever.  GET HELP RIGHT AWAY IF:   2. You feel very sleepy.  3. You throw up (vomit) or have watery poop (diarrhea).  4. You feel sick and have muscle aches and pains.  MAKE SURE YOU:   2. Understand these instructions.  3. Will watch your condition.  4. Will get help right away if you are not doing well or get worse.     This information is not intended to replace advice given to you by your health care provider. Make sure you  discuss any questions you have with your health care provider.     Document Released: 06/05/2009 Document Revised: 01/08/2016 Document Reviewed: 03/04/2013  Elsevier Interactive Patient Education ©2016 [employer]

## 2017-07-10 LAB
BACTERIA BLD CULT: NORMAL
BACTERIA BLD CULT: NORMAL
SIGNIFICANT IND 70042: NORMAL
SIGNIFICANT IND 70042: NORMAL
SITE SITE: NORMAL
SITE SITE: NORMAL
SOURCE SOURCE: NORMAL
SOURCE SOURCE: NORMAL

## 2017-07-11 ENCOUNTER — HOME CARE VISIT (OUTPATIENT)
Dept: HOME HEALTH SERVICES | Facility: HOME HEALTHCARE | Age: 82
End: 2017-07-11
Payer: MEDICARE

## 2017-07-11 VITALS
HEART RATE: 63 BPM | BODY MASS INDEX: 19.63 KG/M2 | TEMPERATURE: 98.9 F | SYSTOLIC BLOOD PRESSURE: 110 MMHG | WEIGHT: 115 LBS | DIASTOLIC BLOOD PRESSURE: 60 MMHG | RESPIRATION RATE: 16 BRPM | HEIGHT: 64 IN

## 2017-07-11 PROCEDURE — 665001 SOC-HOME HEALTH

## 2017-07-11 PROCEDURE — G0162 HHC RN E&M PLAN SVS, 15 MIN: HCPCS

## 2017-07-11 SDOH — ECONOMIC STABILITY: HOUSING INSECURITY: UNSAFE COOKING RANGE AREA: 0

## 2017-07-11 SDOH — ECONOMIC STABILITY: HOUSING INSECURITY: UNSAFE APPLIANCES: 0

## 2017-07-11 SDOH — ECONOMIC STABILITY: HOUSING INSECURITY: HOME SAFETY: HOUSE HAS FIRE SPRINKLERS, TUB SHOWER HAS 2 GRAB BARS, PT HAS EMERGENCY ALERT BUTTON ON.

## 2017-07-11 ASSESSMENT — ACTIVITIES OF DAILY LIVING (ADL)
HOME_HEALTH_OASIS: 01
HOME_HEALTH_OASIS: 02
OASIS_M1830: 03

## 2017-07-11 ASSESSMENT — ENCOUNTER SYMPTOMS
VOMITING: DENIES
NAUSEA: DENIES
DEPRESSED MOOD: 1

## 2017-07-11 ASSESSMENT — PATIENT HEALTH QUESTIONNAIRE - PHQ9
1. LITTLE INTEREST OR PLEASURE IN DOING THINGS: 00
2. FEELING DOWN, DEPRESSED, IRRITABLE, OR HOPELESS: 01

## 2017-07-12 ENCOUNTER — HOME CARE VISIT (OUTPATIENT)
Dept: HOME HEALTH SERVICES | Facility: HOME HEALTHCARE | Age: 82
End: 2017-07-12
Payer: MEDICARE

## 2017-07-12 VITALS
TEMPERATURE: 98.1 F | RESPIRATION RATE: 17 BRPM | DIASTOLIC BLOOD PRESSURE: 50 MMHG | SYSTOLIC BLOOD PRESSURE: 117 MMHG | HEART RATE: 89 BPM

## 2017-07-12 PROCEDURE — G0151 HHCP-SERV OF PT,EA 15 MIN: HCPCS

## 2017-07-12 PROCEDURE — A9300 EXERCISE EQUIPMENT: HCPCS

## 2017-07-12 SDOH — ECONOMIC STABILITY: HOUSING INSECURITY

## 2017-07-12 ASSESSMENT — ACTIVITIES OF DAILY LIVING (ADL): IADLS_COMMENTS: <!--EPICS-->PEND OT EVAL<!--EPICE-->

## 2017-07-13 ENCOUNTER — HOME CARE VISIT (OUTPATIENT)
Dept: HOME HEALTH SERVICES | Facility: HOME HEALTHCARE | Age: 82
End: 2017-07-13
Payer: MEDICARE

## 2017-07-13 VITALS
HEART RATE: 80 BPM | TEMPERATURE: 96.8 F | SYSTOLIC BLOOD PRESSURE: 104 MMHG | DIASTOLIC BLOOD PRESSURE: 51 MMHG | RESPIRATION RATE: 18 BRPM

## 2017-07-13 VITALS
DIASTOLIC BLOOD PRESSURE: 59 MMHG | RESPIRATION RATE: 18 BRPM | HEART RATE: 80 BPM | SYSTOLIC BLOOD PRESSURE: 116 MMHG | TEMPERATURE: 98.5 F

## 2017-07-13 VITALS
DIASTOLIC BLOOD PRESSURE: 58 MMHG | TEMPERATURE: 97.7 F | RESPIRATION RATE: 18 BRPM | SYSTOLIC BLOOD PRESSURE: 120 MMHG | HEART RATE: 69 BPM

## 2017-07-13 PROCEDURE — A6403 STERILE GAUZE>16 <= 48 SQ IN: HCPCS

## 2017-07-13 PROCEDURE — G0152 HHCP-SERV OF OT,EA 15 MIN: HCPCS

## 2017-07-13 PROCEDURE — A6212 FOAM DRG <=16 SQ IN W/BORDER: HCPCS

## 2017-07-13 PROCEDURE — G0299 HHS/HOSPICE OF RN EA 15 MIN: HCPCS

## 2017-07-13 PROCEDURE — A6235 HYDROCOLLD DRG >16<=48 W/O B: HCPCS

## 2017-07-13 PROCEDURE — G0156 HHCP-SVS OF AIDE,EA 15 MIN: HCPCS

## 2017-07-13 PROCEDURE — A6250 SKIN SEAL PROTECT MOISTURIZR: HCPCS

## 2017-07-13 SDOH — ECONOMIC STABILITY: HOUSING INSECURITY

## 2017-07-13 SDOH — ECONOMIC STABILITY: HOUSING INSECURITY: UNSAFE APPLIANCES: 0

## 2017-07-13 SDOH — ECONOMIC STABILITY: HOUSING INSECURITY: UNSAFE COOKING RANGE AREA: 0

## 2017-07-13 ASSESSMENT — ACTIVITIES OF DAILY LIVING (ADL)
GROOMING_ASSISTANCE: 0
EATING_ASSISTANCE: 0
MEAL_PREP_ASSISTANCE: 0
HOUSEKEEPING_ASSISTANCE: 6
EATING_ASSISTANCE: 0
TELEPHONE_ASSISTANCE: 0
BATHING_ASSISTANCE: 5
TOILETING_ASSISTANCE: 0
TRANSPORTATION_ASSISTANCE: 6
DRESSING_LB_ASSISTANCE: 0
DRESSING_UB_ASSISTANCE: 0
TELEPHONE_ASSISTANCE: 0
SHOPPING_ASSISTANCE: 6
ORAL_CARE_ASSISTANCE: 0
LAUNDRY_ASSISTANCE: 6
TRANSPORTATION_ASSISTANCE: 6
ORAL_CARE_ASSISTANCE: 0
DRESSING_UB_ASSISTANCE: 4
BATHING_ASSISTANCE: 4
LAUNDRY_ASSISTANCE: 6
SHOPPING_ASSISTANCE: 6
TOILETING_ASSISTANCE: 0
MEAL_PREP_ASSISTANCE: 6
HOUSEKEEPING_ASSISTANCE: 6
DRESSING_LB_ASSISTANCE: 4
GROOMING_ASSISTANCE: 0
BATHING_ASSISTIVE_EQUIPMENT_NEEDED: TUB TRANSFER BENCH

## 2017-07-13 ASSESSMENT — ENCOUNTER SYMPTOMS
NAUSEA: DENIES
VOMITING: DENIES

## 2017-07-14 ENCOUNTER — HOME CARE VISIT (OUTPATIENT)
Dept: HOME HEALTH SERVICES | Facility: HOME HEALTHCARE | Age: 82
End: 2017-07-14
Payer: MEDICARE

## 2017-07-14 VITALS
HEART RATE: 54 BPM | SYSTOLIC BLOOD PRESSURE: 106 MMHG | RESPIRATION RATE: 17 BRPM | TEMPERATURE: 98.1 F | DIASTOLIC BLOOD PRESSURE: 60 MMHG

## 2017-07-14 PROCEDURE — G0151 HHCP-SERV OF PT,EA 15 MIN: HCPCS

## 2017-07-15 ENCOUNTER — HOME CARE VISIT (OUTPATIENT)
Dept: HOME HEALTH SERVICES | Facility: HOME HEALTHCARE | Age: 82
End: 2017-07-15
Payer: MEDICARE

## 2017-07-15 VITALS
HEART RATE: 68 BPM | SYSTOLIC BLOOD PRESSURE: 120 MMHG | DIASTOLIC BLOOD PRESSURE: 70 MMHG | RESPIRATION RATE: 16 BRPM | TEMPERATURE: 97.7 F

## 2017-07-15 PROCEDURE — G0496 LPN CARE TRAIN/EDU IN HH: HCPCS

## 2017-07-15 SDOH — ECONOMIC STABILITY: HOUSING INSECURITY: UNSAFE COOKING RANGE AREA: 0

## 2017-07-15 SDOH — ECONOMIC STABILITY: HOUSING INSECURITY: UNSAFE APPLIANCES: 0

## 2017-07-17 ENCOUNTER — HOME CARE VISIT (OUTPATIENT)
Dept: HOME HEALTH SERVICES | Facility: HOME HEALTHCARE | Age: 82
End: 2017-07-17
Payer: MEDICARE

## 2017-07-17 VITALS
TEMPERATURE: 97.5 F | HEART RATE: 67 BPM | RESPIRATION RATE: 16 BRPM | SYSTOLIC BLOOD PRESSURE: 118 MMHG | DIASTOLIC BLOOD PRESSURE: 70 MMHG

## 2017-07-17 VITALS
TEMPERATURE: 97.5 F | SYSTOLIC BLOOD PRESSURE: 118 MMHG | RESPIRATION RATE: 16 BRPM | DIASTOLIC BLOOD PRESSURE: 70 MMHG | HEART RATE: 67 BPM

## 2017-07-17 PROCEDURE — G0156 HHCP-SVS OF AIDE,EA 15 MIN: HCPCS

## 2017-07-17 PROCEDURE — G0300 HHS/HOSPICE OF LPN EA 15 MIN: HCPCS

## 2017-07-17 SDOH — ECONOMIC STABILITY: HOUSING INSECURITY: UNSAFE APPLIANCES: 0

## 2017-07-17 SDOH — ECONOMIC STABILITY: HOUSING INSECURITY: UNSAFE COOKING RANGE AREA: 0

## 2017-07-18 ENCOUNTER — HOME CARE VISIT (OUTPATIENT)
Dept: HOME HEALTH SERVICES | Facility: HOME HEALTHCARE | Age: 82
End: 2017-07-18
Payer: MEDICARE

## 2017-07-18 VITALS
DIASTOLIC BLOOD PRESSURE: 59 MMHG | RESPIRATION RATE: 19 BRPM | TEMPERATURE: 97.4 F | SYSTOLIC BLOOD PRESSURE: 117 MMHG | HEART RATE: 82 BPM

## 2017-07-18 PROCEDURE — G0151 HHCP-SERV OF PT,EA 15 MIN: HCPCS

## 2017-07-19 ENCOUNTER — HOME CARE VISIT (OUTPATIENT)
Dept: HOME HEALTH SERVICES | Facility: HOME HEALTHCARE | Age: 82
End: 2017-07-19
Payer: MEDICARE

## 2017-07-19 VITALS
RESPIRATION RATE: 16 BRPM | TEMPERATURE: 96.9 F | HEART RATE: 75 BPM | DIASTOLIC BLOOD PRESSURE: 60 MMHG | SYSTOLIC BLOOD PRESSURE: 102 MMHG

## 2017-07-19 VITALS
HEART RATE: 75 BPM | RESPIRATION RATE: 16 BRPM | DIASTOLIC BLOOD PRESSURE: 60 MMHG | SYSTOLIC BLOOD PRESSURE: 102 MMHG | TEMPERATURE: 96.9 F

## 2017-07-19 PROCEDURE — G0156 HHCP-SVS OF AIDE,EA 15 MIN: HCPCS

## 2017-07-19 PROCEDURE — G0300 HHS/HOSPICE OF LPN EA 15 MIN: HCPCS

## 2017-07-20 ENCOUNTER — HOME CARE VISIT (OUTPATIENT)
Dept: HOME HEALTH SERVICES | Facility: HOME HEALTHCARE | Age: 82
End: 2017-07-20
Payer: MEDICARE

## 2017-07-20 VITALS
TEMPERATURE: 97.6 F | SYSTOLIC BLOOD PRESSURE: 110 MMHG | HEART RATE: 72 BPM | DIASTOLIC BLOOD PRESSURE: 50 MMHG | RESPIRATION RATE: 16 BRPM

## 2017-07-20 PROCEDURE — G0157 HHC PT ASSISTANT EA 15: HCPCS

## 2017-07-21 ENCOUNTER — HOME CARE VISIT (OUTPATIENT)
Dept: HOME HEALTH SERVICES | Facility: HOME HEALTHCARE | Age: 82
End: 2017-07-21
Payer: MEDICARE

## 2017-07-21 VITALS
DIASTOLIC BLOOD PRESSURE: 53 MMHG | HEART RATE: 76 BPM | TEMPERATURE: 207.9 F | SYSTOLIC BLOOD PRESSURE: 111 MMHG | RESPIRATION RATE: 16 BRPM

## 2017-07-21 PROCEDURE — A6212 FOAM DRG <=16 SQ IN W/BORDER: HCPCS

## 2017-07-21 PROCEDURE — G0299 HHS/HOSPICE OF RN EA 15 MIN: HCPCS

## 2017-07-21 PROCEDURE — A6216 NON-STERILE GAUZE<=16 SQ IN: HCPCS

## 2017-07-21 SDOH — ECONOMIC STABILITY: HOUSING INSECURITY: UNSAFE APPLIANCES: 0

## 2017-07-21 SDOH — ECONOMIC STABILITY: HOUSING INSECURITY: UNSAFE COOKING RANGE AREA: 0

## 2017-07-21 ASSESSMENT — ACTIVITIES OF DAILY LIVING (ADL)
GROOMING_ASSISTANCE: 0
BATHING_ASSISTANCE: 4
LAUNDRY_ASSISTANCE: 4
DRESSING_LB_ASSISTANCE: 0
TELEPHONE_ASSISTANCE: 0
DRESSING_UB_ASSISTANCE: 0
TRANSPORTATION_ASSISTANCE: 6
SHOPPING_ASSISTANCE: 6
ORAL_CARE_ASSISTANCE: 0
EATING_ASSISTANCE: 0
MEAL_PREP_ASSISTANCE: 4
HOUSEKEEPING_ASSISTANCE: 6
TOILETING_ASSISTANCE: 0

## 2017-07-21 ASSESSMENT — ENCOUNTER SYMPTOMS
VOMITING: DENIES
RESPIRATORY SYMPTOMS COMMENTS: .
NAUSEA: DENIES

## 2017-07-24 ENCOUNTER — HOME CARE VISIT (OUTPATIENT)
Dept: HOME HEALTH SERVICES | Facility: HOME HEALTHCARE | Age: 82
End: 2017-07-24
Payer: MEDICARE

## 2017-07-24 VITALS
HEART RATE: 73 BPM | RESPIRATION RATE: 18 BRPM | DIASTOLIC BLOOD PRESSURE: 51 MMHG | SYSTOLIC BLOOD PRESSURE: 119 MMHG | TEMPERATURE: 97.5 F

## 2017-07-24 VITALS
TEMPERATURE: 97.5 F | SYSTOLIC BLOOD PRESSURE: 119 MMHG | HEART RATE: 73 BPM | DIASTOLIC BLOOD PRESSURE: 51 MMHG | RESPIRATION RATE: 18 BRPM

## 2017-07-24 PROCEDURE — G0156 HHCP-SVS OF AIDE,EA 15 MIN: HCPCS

## 2017-07-24 PROCEDURE — G0299 HHS/HOSPICE OF RN EA 15 MIN: HCPCS

## 2017-07-24 SDOH — ECONOMIC STABILITY: HOUSING INSECURITY: UNSAFE COOKING RANGE AREA: 0

## 2017-07-24 SDOH — ECONOMIC STABILITY: HOUSING INSECURITY: UNSAFE APPLIANCES: 0

## 2017-07-24 ASSESSMENT — ENCOUNTER SYMPTOMS
NAUSEA: DENIES
VOMITING: DENIES

## 2017-07-24 ASSESSMENT — ACTIVITIES OF DAILY LIVING (ADL)
MEAL_PREP_ASSISTANCE: 6
LAUNDRY_ASSISTANCE: 6
DRESSING_LB_ASSISTANCE: 0
TOILETING_ASSISTANCE: 0
TRANSPORTATION_ASSISTANCE: 6
GROOMING_ASSISTANCE: 0
ORAL_CARE_ASSISTANCE: 0
TELEPHONE_ASSISTANCE: 0
DRESSING_UB_ASSISTANCE: 0
HOUSEKEEPING_ASSISTANCE: 6
SHOPPING_ASSISTANCE: 6
EATING_ASSISTANCE: 0
BATHING_ASSISTANCE: 4

## 2017-07-25 ENCOUNTER — HOME CARE VISIT (OUTPATIENT)
Dept: HOME HEALTH SERVICES | Facility: HOME HEALTHCARE | Age: 82
End: 2017-07-25
Payer: MEDICARE

## 2017-07-26 ENCOUNTER — HOME CARE VISIT (OUTPATIENT)
Dept: HOME HEALTH SERVICES | Facility: HOME HEALTHCARE | Age: 82
End: 2017-07-26
Payer: MEDICARE

## 2017-07-26 ENCOUNTER — HOME CARE VISIT (OUTPATIENT)
Dept: HOME HEALTH SERVICES | Facility: HOME HEALTHCARE | Age: 82
End: 2017-07-26

## 2017-07-26 VITALS
TEMPERATURE: 98.3 F | SYSTOLIC BLOOD PRESSURE: 94 MMHG | BODY MASS INDEX: 19.56 KG/M2 | WEIGHT: 114 LBS | HEART RATE: 80 BPM | DIASTOLIC BLOOD PRESSURE: 50 MMHG | RESPIRATION RATE: 16 BRPM

## 2017-07-26 PROCEDURE — G0151 HHCP-SERV OF PT,EA 15 MIN: HCPCS

## 2017-07-27 ENCOUNTER — HOME CARE VISIT (OUTPATIENT)
Dept: HOME HEALTH SERVICES | Facility: HOME HEALTHCARE | Age: 82
End: 2017-07-27
Payer: MEDICARE

## 2017-07-27 PROCEDURE — G0299 HHS/HOSPICE OF RN EA 15 MIN: HCPCS

## 2017-07-29 ENCOUNTER — HOME CARE VISIT (OUTPATIENT)
Dept: HOME HEALTH SERVICES | Facility: HOME HEALTHCARE | Age: 82
End: 2017-07-29
Payer: MEDICARE

## 2017-07-29 PROCEDURE — G0299 HHS/HOSPICE OF RN EA 15 MIN: HCPCS

## 2017-07-30 VITALS
DIASTOLIC BLOOD PRESSURE: 70 MMHG | TEMPERATURE: 97.8 F | SYSTOLIC BLOOD PRESSURE: 110 MMHG | RESPIRATION RATE: 16 BRPM | HEART RATE: 62 BPM

## 2017-07-30 SDOH — ECONOMIC STABILITY: HOUSING INSECURITY: UNSAFE COOKING RANGE AREA: 0

## 2017-07-30 SDOH — ECONOMIC STABILITY: HOUSING INSECURITY: UNSAFE APPLIANCES: 0

## 2017-07-30 ASSESSMENT — ENCOUNTER SYMPTOMS
NAUSEA: DENIES
VOMITING: DENIES

## 2017-07-31 ENCOUNTER — HOME CARE VISIT (OUTPATIENT)
Dept: HOME HEALTH SERVICES | Facility: HOME HEALTHCARE | Age: 82
End: 2017-07-31
Payer: MEDICARE

## 2017-07-31 VITALS
BODY MASS INDEX: 19.56 KG/M2 | RESPIRATION RATE: 20 BRPM | HEART RATE: 74 BPM | SYSTOLIC BLOOD PRESSURE: 120 MMHG | WEIGHT: 114 LBS | TEMPERATURE: 98 F | DIASTOLIC BLOOD PRESSURE: 70 MMHG

## 2017-07-31 PROCEDURE — A6212 FOAM DRG <=16 SQ IN W/BORDER: HCPCS

## 2017-07-31 PROCEDURE — G0299 HHS/HOSPICE OF RN EA 15 MIN: HCPCS

## 2017-07-31 SDOH — ECONOMIC STABILITY: HOUSING INSECURITY: UNSAFE COOKING RANGE AREA: 0

## 2017-07-31 SDOH — ECONOMIC STABILITY: HOUSING INSECURITY: UNSAFE APPLIANCES: 0

## 2017-07-31 ASSESSMENT — ENCOUNTER SYMPTOMS
DEBILITATING PAIN: 1
RESPIRATORY SYMPTOMS COMMENTS: NO S/S OF RESP DISTRESS

## 2017-08-01 VITALS
HEART RATE: 85 BPM | TEMPERATURE: 96.8 F | RESPIRATION RATE: 18 BRPM | DIASTOLIC BLOOD PRESSURE: 43 MMHG | SYSTOLIC BLOOD PRESSURE: 110 MMHG

## 2017-08-01 SDOH — ECONOMIC STABILITY: HOUSING INSECURITY: UNSAFE COOKING RANGE AREA: 0

## 2017-08-01 SDOH — ECONOMIC STABILITY: HOUSING INSECURITY: UNSAFE APPLIANCES: 0

## 2017-08-01 ASSESSMENT — ACTIVITIES OF DAILY LIVING (ADL)
DRESSING_UB_ASSISTANCE: 0
LAUNDRY_ASSISTANCE: 4
TOILETING_ASSISTANCE: 0
ORAL_CARE_ASSISTANCE: 0
SHOPPING_ASSISTANCE: 6
MEAL_PREP_ASSISTANCE: 4
TRANSPORTATION_ASSISTANCE: 6
TELEPHONE_ASSISTANCE: 0
HOUSEKEEPING_ASSISTANCE: 5
EATING_ASSISTANCE: 0
BATHING_ASSISTANCE: 0
DRESSING_LB_ASSISTANCE: 0
GROOMING_ASSISTANCE: 0

## 2017-08-01 ASSESSMENT — SOCIAL DETERMINANTS OF HEALTH (SDOH): IS CONCERNED ABOUT INCOME: N

## 2017-08-02 ENCOUNTER — HOME CARE VISIT (OUTPATIENT)
Dept: HOME HEALTH SERVICES | Facility: HOME HEALTHCARE | Age: 82
End: 2017-08-02
Payer: MEDICARE

## 2017-08-02 VITALS
RESPIRATION RATE: 16 BRPM | SYSTOLIC BLOOD PRESSURE: 120 MMHG | DIASTOLIC BLOOD PRESSURE: 50 MMHG | HEART RATE: 72 BPM | TEMPERATURE: 97.4 F

## 2017-08-02 PROCEDURE — G0151 HHCP-SERV OF PT,EA 15 MIN: HCPCS

## 2017-08-03 ENCOUNTER — HOME CARE VISIT (OUTPATIENT)
Dept: HOME HEALTH SERVICES | Facility: HOME HEALTHCARE | Age: 82
End: 2017-08-03

## 2017-08-03 ENCOUNTER — HOME CARE VISIT (OUTPATIENT)
Dept: HOME HEALTH SERVICES | Facility: HOME HEALTHCARE | Age: 82
End: 2017-08-03
Payer: MEDICARE

## 2017-08-03 VITALS
HEART RATE: 79 BPM | SYSTOLIC BLOOD PRESSURE: 124 MMHG | DIASTOLIC BLOOD PRESSURE: 70 MMHG | TEMPERATURE: 97.8 F | RESPIRATION RATE: 18 BRPM

## 2017-08-03 PROCEDURE — G0299 HHS/HOSPICE OF RN EA 15 MIN: HCPCS

## 2017-08-03 SDOH — ECONOMIC STABILITY: HOUSING INSECURITY: UNSAFE COOKING RANGE AREA: 0

## 2017-08-03 SDOH — ECONOMIC STABILITY: HOUSING INSECURITY: UNSAFE APPLIANCES: 0

## 2017-08-03 ASSESSMENT — ACTIVITIES OF DAILY LIVING (ADL)
LAUNDRY_ASSISTANCE: 4
TOILETING_ASSISTANCE: 0
GROOMING_ASSISTANCE: 0
HOUSEKEEPING_ASSISTANCE: 4
SHOPPING_ASSISTANCE: 6
MEAL_PREP_ASSISTANCE: 4
EATING_ASSISTANCE: 0
DRESSING_UB_ASSISTANCE: 0
TELEPHONE_ASSISTANCE: 0
DRESSING_LB_ASSISTANCE: 0
TRANSPORTATION_ASSISTANCE: 6
ORAL_CARE_ASSISTANCE: 0
BATHING_ASSISTANCE: 1
BATHING_ASSISTANCE: 0

## 2017-08-03 ASSESSMENT — ENCOUNTER SYMPTOMS
RESPIRATORY SYMPTOMS COMMENTS: RESPIRATIONS EVEN AND UNLABORED, NO SOB NOTED.
VOMITING: DENIES
NAUSEA: DENIES

## 2017-08-07 ENCOUNTER — HOME CARE VISIT (OUTPATIENT)
Dept: HOME HEALTH SERVICES | Facility: HOME HEALTHCARE | Age: 82
End: 2017-08-07
Payer: MEDICARE

## 2017-08-07 VITALS
HEART RATE: 87 BPM | DIASTOLIC BLOOD PRESSURE: 58 MMHG | RESPIRATION RATE: 18 BRPM | TEMPERATURE: 97.4 F | WEIGHT: 115 LBS | SYSTOLIC BLOOD PRESSURE: 102 MMHG | BODY MASS INDEX: 19.73 KG/M2

## 2017-08-07 PROCEDURE — A6214 FOAM DRG > 48 SQ IN W/BORDER: HCPCS

## 2017-08-07 PROCEDURE — G0299 HHS/HOSPICE OF RN EA 15 MIN: HCPCS

## 2017-08-07 SDOH — ECONOMIC STABILITY: HOUSING INSECURITY: UNSAFE COOKING RANGE AREA: 0

## 2017-08-07 SDOH — ECONOMIC STABILITY: HOUSING INSECURITY: UNSAFE APPLIANCES: 0

## 2017-08-07 ASSESSMENT — ACTIVITIES OF DAILY LIVING (ADL)
TRANSPORTATION_ASSISTANCE: 6
BATHING_ASSISTANCE: 0
TOILETING_ASSISTANCE: 0
MEAL_PREP_ASSISTANCE: 0
SHOPPING_ASSISTANCE: 5
TELEPHONE_ASSISTANCE: 0
GROOMING_ASSISTANCE: 0
ORAL_CARE_ASSISTANCE: 0
HOUSEKEEPING_ASSISTANCE: 5
HOUSEKEEPING_ASSISTANCE: 1
LAUNDRY_ASSISTANCE: 0
EATING_ASSISTANCE: 0
DRESSING_UB_ASSISTANCE: 0
DRESSING_LB_ASSISTANCE: 0

## 2017-08-07 ASSESSMENT — ENCOUNTER SYMPTOMS
VOMITING: DENIES
NAUSEA: DENIES
RESPIRATORY SYMPTOMS COMMENTS: RESPIRATIONS ARE EVEN AND UNLABORED WITH NO SOB NOTED

## 2017-08-10 ENCOUNTER — HOME CARE VISIT (OUTPATIENT)
Dept: HOME HEALTH SERVICES | Facility: HOME HEALTHCARE | Age: 82
End: 2017-08-10
Payer: MEDICARE

## 2017-08-10 VITALS
DIASTOLIC BLOOD PRESSURE: 58 MMHG | HEART RATE: 88 BPM | SYSTOLIC BLOOD PRESSURE: 120 MMHG | TEMPERATURE: 97.9 F | RESPIRATION RATE: 16 BRPM

## 2017-08-10 PROCEDURE — G0299 HHS/HOSPICE OF RN EA 15 MIN: HCPCS

## 2017-08-10 PROCEDURE — A6212 FOAM DRG <=16 SQ IN W/BORDER: HCPCS

## 2017-08-10 SDOH — ECONOMIC STABILITY: HOUSING INSECURITY: UNSAFE APPLIANCES: 0

## 2017-08-10 SDOH — ECONOMIC STABILITY: HOUSING INSECURITY: UNSAFE COOKING RANGE AREA: 0

## 2017-08-10 ASSESSMENT — ENCOUNTER SYMPTOMS
RESPIRATORY SYMPTOMS COMMENTS: RESPIRATIONS EVEN AND UNLABORED, NO SOB NOTED.
NAUSEA: DENIES.
SHORTNESS OF BREATH: F
VOMITING: DENIES.

## 2017-08-18 ENCOUNTER — HOME CARE VISIT (OUTPATIENT)
Dept: HOME HEALTH SERVICES | Facility: HOME HEALTHCARE | Age: 82
End: 2017-08-18
Payer: MEDICARE

## 2017-08-18 VITALS
HEART RATE: 60 BPM | RESPIRATION RATE: 18 BRPM | SYSTOLIC BLOOD PRESSURE: 103 MMHG | TEMPERATURE: 208.2 F | DIASTOLIC BLOOD PRESSURE: 49 MMHG

## 2017-08-18 PROCEDURE — G0299 HHS/HOSPICE OF RN EA 15 MIN: HCPCS

## 2017-08-18 PROCEDURE — A4649 SURGICAL SUPPLIES: HCPCS

## 2017-08-18 PROCEDURE — A6212 FOAM DRG <=16 SQ IN W/BORDER: HCPCS

## 2017-08-18 SDOH — ECONOMIC STABILITY: HOUSING INSECURITY: UNSAFE APPLIANCES: 0

## 2017-08-18 SDOH — ECONOMIC STABILITY: HOUSING INSECURITY: UNSAFE COOKING RANGE AREA: 0

## 2017-08-18 ASSESSMENT — ACTIVITIES OF DAILY LIVING (ADL)
HOUSEKEEPING_ASSISTANCE: 5
BATHING_ASSISTANCE: 0
ORAL_CARE_ASSISTANCE: 0
LAUNDRY_ASSISTANCE: 5
TRANSPORTATION_ASSISTANCE: 6
DRESSING_LB_ASSISTANCE: 0
GROOMING_ASSISTANCE: 0
TOILETING_ASSISTANCE: 0
EATING_ASSISTANCE: 0
TELEPHONE_ASSISTANCE: 0
SHOPPING_ASSISTANCE: 6
MEAL_PREP_ASSISTANCE: 0
DRESSING_UB_ASSISTANCE: 0

## 2017-08-18 ASSESSMENT — ENCOUNTER SYMPTOMS
VOMITING: DENIES
NAUSEA: DENIES

## 2017-08-23 ENCOUNTER — PATIENT OUTREACH (OUTPATIENT)
Dept: HEALTH INFORMATION MANAGEMENT | Facility: OTHER | Age: 82
End: 2017-08-23

## 2017-08-23 NOTE — PROGRESS NOTES
Mila Scanlon was discharged from [facility] on 7/9/17. She was discharged home with instructions to follow up with PCP on 7/11 patient completed appt and had follow up appt on 8/9 which pt cancelled, [facility] patient initial start date 7/12, patient is still receiving Home Health services to date once a week . Patient Kept 2 out 3 of her follow up appointments. Patient is not seeing any other providers at this time. [facility] patient advocated was able to engage with the patient several times upon discharge, and confirmed that he picked up all medications upon discharge.

## 2017-08-25 ENCOUNTER — HOME CARE VISIT (OUTPATIENT)
Dept: HOME HEALTH SERVICES | Facility: HOME HEALTHCARE | Age: 82
End: 2017-08-25
Payer: MEDICARE

## 2017-08-25 VITALS
SYSTOLIC BLOOD PRESSURE: 100 MMHG | TEMPERATURE: 98.6 F | RESPIRATION RATE: 18 BRPM | HEART RATE: 90 BPM | DIASTOLIC BLOOD PRESSURE: 60 MMHG

## 2017-08-25 PROCEDURE — G0299 HHS/HOSPICE OF RN EA 15 MIN: HCPCS

## 2017-08-25 SDOH — ECONOMIC STABILITY: HOUSING INSECURITY: UNSAFE APPLIANCES: 0

## 2017-08-25 SDOH — ECONOMIC STABILITY: HOUSING INSECURITY: UNSAFE COOKING RANGE AREA: 0

## 2017-08-25 ASSESSMENT — ENCOUNTER SYMPTOMS
NAUSEA: DENIES ANY
VOMITING: DENIES ANY

## 2017-08-28 ENCOUNTER — HOME CARE VISIT (OUTPATIENT)
Dept: HOME HEALTH SERVICES | Facility: HOME HEALTHCARE | Age: 82
End: 2017-08-28
Payer: MEDICARE

## 2017-08-28 PROCEDURE — G0299 HHS/HOSPICE OF RN EA 15 MIN: HCPCS

## 2017-08-29 VITALS
SYSTOLIC BLOOD PRESSURE: 110 MMHG | TEMPERATURE: 97.4 F | HEART RATE: 81 BPM | DIASTOLIC BLOOD PRESSURE: 60 MMHG | OXYGEN SATURATION: 97 % | RESPIRATION RATE: 18 BRPM

## 2017-08-29 ASSESSMENT — ENCOUNTER SYMPTOMS
VOMITING: NAUSEA
NAUSEA: NAUSEA

## 2017-08-30 SDOH — ECONOMIC STABILITY: HOUSING INSECURITY: UNSAFE APPLIANCES: 0

## 2017-08-30 SDOH — ECONOMIC STABILITY: HOUSING INSECURITY: UNSAFE COOKING RANGE AREA: 0

## 2017-09-01 ENCOUNTER — HOME CARE VISIT (OUTPATIENT)
Dept: HOME HEALTH SERVICES | Facility: HOME HEALTHCARE | Age: 82
End: 2017-09-01
Payer: MEDICARE

## 2017-09-01 VITALS
DIASTOLIC BLOOD PRESSURE: 58 MMHG | OXYGEN SATURATION: 96 % | TEMPERATURE: 98.4 F | WEIGHT: 117 LBS | BODY MASS INDEX: 20.08 KG/M2 | HEART RATE: 81 BPM | RESPIRATION RATE: 16 BRPM | SYSTOLIC BLOOD PRESSURE: 112 MMHG

## 2017-09-01 PROCEDURE — G0299 HHS/HOSPICE OF RN EA 15 MIN: HCPCS

## 2017-09-01 ASSESSMENT — ENCOUNTER SYMPTOMS
NAUSEA: DENIES
VOMITING: DENIES

## 2017-09-04 ENCOUNTER — HOME CARE VISIT (OUTPATIENT)
Dept: HOME HEALTH SERVICES | Facility: HOME HEALTHCARE | Age: 82
End: 2017-09-04
Payer: MEDICARE

## 2017-09-04 VITALS
RESPIRATION RATE: 16 BRPM | OXYGEN SATURATION: 95 % | TEMPERATURE: 97.8 F | DIASTOLIC BLOOD PRESSURE: 60 MMHG | HEART RATE: 74 BPM | SYSTOLIC BLOOD PRESSURE: 110 MMHG

## 2017-09-04 PROCEDURE — G0299 HHS/HOSPICE OF RN EA 15 MIN: HCPCS

## 2017-09-04 SDOH — ECONOMIC STABILITY: HOUSING INSECURITY: UNSAFE APPLIANCES: 0

## 2017-09-04 SDOH — ECONOMIC STABILITY: HOUSING INSECURITY: UNSAFE COOKING RANGE AREA: 0

## 2017-09-04 ASSESSMENT — ENCOUNTER SYMPTOMS
VOMITING: DENIES
NAUSEA: DENIES

## 2017-09-08 ENCOUNTER — HOME CARE VISIT (OUTPATIENT)
Dept: HOME HEALTH SERVICES | Facility: HOME HEALTHCARE | Age: 82
End: 2017-09-08
Payer: MEDICARE

## 2017-09-08 PROCEDURE — G0162 HHC RN E&M PLAN SVS, 15 MIN: HCPCS

## 2017-09-10 VITALS
WEIGHT: 119 LBS | HEIGHT: 64 IN | DIASTOLIC BLOOD PRESSURE: 60 MMHG | SYSTOLIC BLOOD PRESSURE: 110 MMHG | BODY MASS INDEX: 20.32 KG/M2 | HEART RATE: 71 BPM | TEMPERATURE: 97.3 F | OXYGEN SATURATION: 96 %

## 2017-09-10 SDOH — ECONOMIC STABILITY: HOUSING INSECURITY: UNSAFE COOKING RANGE AREA: 0

## 2017-09-10 SDOH — ECONOMIC STABILITY: HOUSING INSECURITY: UNSAFE APPLIANCES: 0

## 2017-09-10 ASSESSMENT — ENCOUNTER SYMPTOMS
NAUSEA: DENIES
VOMITING: DENIES

## 2017-09-11 ENCOUNTER — HOME CARE VISIT (OUTPATIENT)
Dept: HOME HEALTH SERVICES | Facility: HOME HEALTHCARE | Age: 82
End: 2017-09-11
Payer: MEDICARE

## 2017-09-11 VITALS
DIASTOLIC BLOOD PRESSURE: 60 MMHG | OXYGEN SATURATION: 95 % | HEART RATE: 69 BPM | SYSTOLIC BLOOD PRESSURE: 110 MMHG | RESPIRATION RATE: 16 BRPM | TEMPERATURE: 98.2 F

## 2017-09-11 PROCEDURE — 665003 FOLLOW UP-HOME HEALTH

## 2017-09-11 PROCEDURE — G0299 HHS/HOSPICE OF RN EA 15 MIN: HCPCS

## 2017-09-11 SDOH — ECONOMIC STABILITY: HOUSING INSECURITY: UNSAFE COOKING RANGE AREA: 0

## 2017-09-11 SDOH — ECONOMIC STABILITY: HOUSING INSECURITY: UNSAFE APPLIANCES: 0

## 2017-09-11 ASSESSMENT — ENCOUNTER SYMPTOMS
VOMITING: DENIES
NAUSEA: DENIES

## 2017-09-13 ENCOUNTER — HOME CARE VISIT (OUTPATIENT)
Dept: HOME HEALTH SERVICES | Facility: HOME HEALTHCARE | Age: 82
End: 2017-09-13
Payer: MEDICARE

## 2017-09-13 VITALS
HEART RATE: 76 BPM | SYSTOLIC BLOOD PRESSURE: 138 MMHG | TEMPERATURE: 98.9 F | RESPIRATION RATE: 18 BRPM | OXYGEN SATURATION: 96 % | DIASTOLIC BLOOD PRESSURE: 50 MMHG

## 2017-09-13 PROCEDURE — A6214 FOAM DRG > 48 SQ IN W/BORDER: HCPCS

## 2017-09-13 PROCEDURE — A6196 ALGINATE DRESSING <=16 SQ IN: HCPCS

## 2017-09-13 PROCEDURE — G0299 HHS/HOSPICE OF RN EA 15 MIN: HCPCS

## 2017-09-13 PROCEDURE — A6209 FOAM DRSG <=16 SQ IN W/O BDR: HCPCS

## 2017-09-13 PROCEDURE — A6402 STERILE GAUZE <= 16 SQ IN: HCPCS

## 2017-09-13 SDOH — ECONOMIC STABILITY: HOUSING INSECURITY: UNSAFE COOKING RANGE AREA: 0

## 2017-09-13 SDOH — ECONOMIC STABILITY: HOUSING INSECURITY: UNSAFE APPLIANCES: 0

## 2017-09-13 ASSESSMENT — ENCOUNTER SYMPTOMS
NAUSEA: NO
VOMITING: NO

## 2017-09-15 ENCOUNTER — HOME CARE VISIT (OUTPATIENT)
Dept: HOME HEALTH SERVICES | Facility: HOME HEALTHCARE | Age: 82
End: 2017-09-15
Payer: MEDICARE

## 2017-09-15 PROCEDURE — G0299 HHS/HOSPICE OF RN EA 15 MIN: HCPCS

## 2017-09-15 ASSESSMENT — ACTIVITIES OF DAILY LIVING (ADL): OASIS_M1830: 02

## 2017-09-17 VITALS
RESPIRATION RATE: 18 BRPM | SYSTOLIC BLOOD PRESSURE: 122 MMHG | TEMPERATURE: 97.8 F | HEART RATE: 73 BPM | DIASTOLIC BLOOD PRESSURE: 80 MMHG | OXYGEN SATURATION: 97 %

## 2017-09-17 SDOH — ECONOMIC STABILITY: HOUSING INSECURITY: UNSAFE COOKING RANGE AREA: 0

## 2017-09-17 SDOH — ECONOMIC STABILITY: HOUSING INSECURITY: UNSAFE APPLIANCES: 0

## 2017-09-17 ASSESSMENT — ENCOUNTER SYMPTOMS
NAUSEA: DENIES
VOMITING: DENIES

## 2017-09-18 ENCOUNTER — HOME CARE VISIT (OUTPATIENT)
Dept: HOME HEALTH SERVICES | Facility: HOME HEALTHCARE | Age: 82
End: 2017-09-18
Payer: MEDICARE

## 2017-09-18 VITALS
RESPIRATION RATE: 18 BRPM | OXYGEN SATURATION: 97 % | TEMPERATURE: 97.6 F | DIASTOLIC BLOOD PRESSURE: 60 MMHG | HEART RATE: 84 BPM | SYSTOLIC BLOOD PRESSURE: 118 MMHG

## 2017-09-18 PROCEDURE — G0299 HHS/HOSPICE OF RN EA 15 MIN: HCPCS

## 2017-09-18 SDOH — ECONOMIC STABILITY: HOUSING INSECURITY: UNSAFE COOKING RANGE AREA: 0

## 2017-09-18 SDOH — ECONOMIC STABILITY: HOUSING INSECURITY: UNSAFE APPLIANCES: 0

## 2017-09-18 ASSESSMENT — ENCOUNTER SYMPTOMS
NAUSEA: DENIES
VOMITING: DENIES

## 2017-09-20 ENCOUNTER — HOME CARE VISIT (OUTPATIENT)
Dept: HOME HEALTH SERVICES | Facility: HOME HEALTHCARE | Age: 82
End: 2017-09-20
Payer: MEDICARE

## 2017-09-20 VITALS
TEMPERATURE: 97.7 F | HEART RATE: 65 BPM | RESPIRATION RATE: 16 BRPM | DIASTOLIC BLOOD PRESSURE: 64 MMHG | SYSTOLIC BLOOD PRESSURE: 136 MMHG | OXYGEN SATURATION: 97 %

## 2017-09-20 PROCEDURE — G0300 HHS/HOSPICE OF LPN EA 15 MIN: HCPCS

## 2017-09-20 SDOH — ECONOMIC STABILITY: HOUSING INSECURITY: UNSAFE APPLIANCES: 0

## 2017-09-20 SDOH — ECONOMIC STABILITY: HOUSING INSECURITY: UNSAFE COOKING RANGE AREA: 0

## 2017-09-22 ENCOUNTER — HOME CARE VISIT (OUTPATIENT)
Dept: HOME HEALTH SERVICES | Facility: HOME HEALTHCARE | Age: 82
End: 2017-09-22
Payer: MEDICARE

## 2017-09-22 VITALS
BODY MASS INDEX: 20.25 KG/M2 | OXYGEN SATURATION: 94 % | SYSTOLIC BLOOD PRESSURE: 130 MMHG | TEMPERATURE: 98 F | WEIGHT: 118 LBS | DIASTOLIC BLOOD PRESSURE: 60 MMHG | HEART RATE: 67 BPM | RESPIRATION RATE: 16 BRPM

## 2017-09-22 PROCEDURE — G0299 HHS/HOSPICE OF RN EA 15 MIN: HCPCS

## 2017-09-23 SDOH — ECONOMIC STABILITY: HOUSING INSECURITY: UNSAFE APPLIANCES: 0

## 2017-09-23 SDOH — ECONOMIC STABILITY: HOUSING INSECURITY: UNSAFE COOKING RANGE AREA: 0

## 2017-09-23 ASSESSMENT — ENCOUNTER SYMPTOMS
NAUSEA: DENIES
VOMITING: DENIES

## 2017-09-25 ENCOUNTER — HOME CARE VISIT (OUTPATIENT)
Dept: HOME HEALTH SERVICES | Facility: HOME HEALTHCARE | Age: 82
End: 2017-09-25
Payer: MEDICARE

## 2017-09-25 PROCEDURE — G0299 HHS/HOSPICE OF RN EA 15 MIN: HCPCS

## 2017-09-26 VITALS
SYSTOLIC BLOOD PRESSURE: 110 MMHG | DIASTOLIC BLOOD PRESSURE: 60 MMHG | RESPIRATION RATE: 16 BRPM | OXYGEN SATURATION: 96 % | TEMPERATURE: 97.8 F | HEART RATE: 64 BPM

## 2017-09-26 SDOH — ECONOMIC STABILITY: HOUSING INSECURITY: UNSAFE COOKING RANGE AREA: 0

## 2017-09-26 SDOH — ECONOMIC STABILITY: HOUSING INSECURITY: UNSAFE APPLIANCES: 0

## 2017-09-26 ASSESSMENT — ENCOUNTER SYMPTOMS
NAUSEA: DENIES
VOMITING: DENIES

## 2017-09-27 ENCOUNTER — HOME CARE VISIT (OUTPATIENT)
Dept: HOME HEALTH SERVICES | Facility: HOME HEALTHCARE | Age: 82
End: 2017-09-27
Payer: MEDICARE

## 2017-09-27 VITALS
TEMPERATURE: 97.6 F | SYSTOLIC BLOOD PRESSURE: 138 MMHG | DIASTOLIC BLOOD PRESSURE: 70 MMHG | OXYGEN SATURATION: 98 % | RESPIRATION RATE: 18 BRPM | HEART RATE: 64 BPM

## 2017-09-27 PROCEDURE — G0299 HHS/HOSPICE OF RN EA 15 MIN: HCPCS

## 2017-09-27 SDOH — ECONOMIC STABILITY: HOUSING INSECURITY: UNSAFE APPLIANCES: 0

## 2017-09-27 SDOH — ECONOMIC STABILITY: HOUSING INSECURITY: UNSAFE COOKING RANGE AREA: 0

## 2017-09-27 ASSESSMENT — ENCOUNTER SYMPTOMS
NAUSEA: DENIES
VOMITING: DENIES
RESPIRATORY SYMPTOMS COMMENTS: RESPIRATIONS EVEN AND UNLABORED, NO SOB NOTED.

## 2017-09-27 ASSESSMENT — ACTIVITIES OF DAILY LIVING (ADL)
GROOMING_ASSISTANCE: 0
DRESSING_LB_ASSISTANCE: 0
ORAL_CARE_ASSISTANCE: 0
EATING_ASSISTANCE: 0
BATHING_ASSISTANCE: 0
TELEPHONE_ASSISTANCE: 0
TOILETING_ASSISTANCE: 0
DRESSING_UB_ASSISTANCE: 0
TRANSPORTATION_ASSISTANCE: 6

## 2017-09-29 ENCOUNTER — HOME CARE VISIT (OUTPATIENT)
Dept: HOME HEALTH SERVICES | Facility: HOME HEALTHCARE | Age: 82
End: 2017-09-29
Payer: MEDICARE

## 2017-09-29 VITALS
RESPIRATION RATE: 16 BRPM | SYSTOLIC BLOOD PRESSURE: 120 MMHG | HEART RATE: 78 BPM | TEMPERATURE: 97.8 F | DIASTOLIC BLOOD PRESSURE: 70 MMHG | OXYGEN SATURATION: 98 %

## 2017-09-29 PROCEDURE — G0299 HHS/HOSPICE OF RN EA 15 MIN: HCPCS

## 2017-10-01 SDOH — ECONOMIC STABILITY: HOUSING INSECURITY: UNSAFE COOKING RANGE AREA: 0

## 2017-10-01 SDOH — ECONOMIC STABILITY: HOUSING INSECURITY: UNSAFE APPLIANCES: 0

## 2017-10-01 ASSESSMENT — ENCOUNTER SYMPTOMS
VOMITING: DENIES
NAUSEA: DENIES

## 2017-10-02 ENCOUNTER — HOME CARE VISIT (OUTPATIENT)
Dept: HOME HEALTH SERVICES | Facility: HOME HEALTHCARE | Age: 82
End: 2017-10-02
Payer: MEDICARE

## 2017-10-02 PROCEDURE — G0299 HHS/HOSPICE OF RN EA 15 MIN: HCPCS

## 2017-10-03 VITALS
RESPIRATION RATE: 16 BRPM | TEMPERATURE: 97.9 F | OXYGEN SATURATION: 96 % | HEART RATE: 64 BPM | SYSTOLIC BLOOD PRESSURE: 120 MMHG | DIASTOLIC BLOOD PRESSURE: 70 MMHG

## 2017-10-05 ENCOUNTER — HOME CARE VISIT (OUTPATIENT)
Dept: HOME HEALTH SERVICES | Facility: HOME HEALTHCARE | Age: 82
End: 2017-10-05
Payer: MEDICARE

## 2017-10-05 VITALS
TEMPERATURE: 97.9 F | HEART RATE: 66 BPM | SYSTOLIC BLOOD PRESSURE: 112 MMHG | RESPIRATION RATE: 16 BRPM | OXYGEN SATURATION: 96 % | DIASTOLIC BLOOD PRESSURE: 60 MMHG

## 2017-10-05 PROCEDURE — G0300 HHS/HOSPICE OF LPN EA 15 MIN: HCPCS

## 2017-10-05 SDOH — ECONOMIC STABILITY: HOUSING INSECURITY: UNSAFE COOKING RANGE AREA: 0

## 2017-10-05 SDOH — ECONOMIC STABILITY: HOUSING INSECURITY: UNSAFE APPLIANCES: 0

## 2017-10-07 SDOH — ECONOMIC STABILITY: HOUSING INSECURITY: UNSAFE APPLIANCES: 0

## 2017-10-07 SDOH — ECONOMIC STABILITY: HOUSING INSECURITY: UNSAFE COOKING RANGE AREA: 0

## 2017-10-07 ASSESSMENT — ENCOUNTER SYMPTOMS
VOMITING: DENIES
NAUSEA: DENIES

## 2017-10-09 ENCOUNTER — HOME CARE VISIT (OUTPATIENT)
Dept: HOME HEALTH SERVICES | Facility: HOME HEALTHCARE | Age: 82
End: 2017-10-09
Payer: MEDICARE

## 2017-10-09 VITALS
DIASTOLIC BLOOD PRESSURE: 70 MMHG | TEMPERATURE: 97.8 F | SYSTOLIC BLOOD PRESSURE: 120 MMHG | RESPIRATION RATE: 16 BRPM | HEART RATE: 61 BPM | OXYGEN SATURATION: 95 %

## 2017-10-09 PROCEDURE — G0299 HHS/HOSPICE OF RN EA 15 MIN: HCPCS

## 2017-10-09 SDOH — ECONOMIC STABILITY: HOUSING INSECURITY: UNSAFE COOKING RANGE AREA: 0

## 2017-10-09 SDOH — ECONOMIC STABILITY: HOUSING INSECURITY: UNSAFE APPLIANCES: 0

## 2017-10-09 ASSESSMENT — ENCOUNTER SYMPTOMS
VOMITING: DENIES
NAUSEA: DENIES

## 2017-10-12 ENCOUNTER — HOME CARE VISIT (OUTPATIENT)
Dept: HOME HEALTH SERVICES | Facility: HOME HEALTHCARE | Age: 82
End: 2017-10-12
Payer: MEDICARE

## 2017-10-12 PROCEDURE — G0299 HHS/HOSPICE OF RN EA 15 MIN: HCPCS

## 2017-10-13 VITALS
SYSTOLIC BLOOD PRESSURE: 130 MMHG | DIASTOLIC BLOOD PRESSURE: 70 MMHG | OXYGEN SATURATION: 98 % | RESPIRATION RATE: 16 BRPM | TEMPERATURE: 98.1 F | HEART RATE: 79 BPM

## 2017-10-13 SDOH — ECONOMIC STABILITY: HOUSING INSECURITY: UNSAFE COOKING RANGE AREA: 0

## 2017-10-13 SDOH — ECONOMIC STABILITY: HOUSING INSECURITY: UNSAFE APPLIANCES: 0

## 2017-10-13 ASSESSMENT — ENCOUNTER SYMPTOMS
NAUSEA: DENIES
VOMITING: DENIES

## 2017-10-14 ENCOUNTER — HOME CARE VISIT (OUTPATIENT)
Dept: HOME HEALTH SERVICES | Facility: HOME HEALTHCARE | Age: 82
End: 2017-10-14
Payer: MEDICARE

## 2017-10-14 PROCEDURE — G0299 HHS/HOSPICE OF RN EA 15 MIN: HCPCS

## 2017-10-15 VITALS
OXYGEN SATURATION: 97 % | WEIGHT: 118 LBS | SYSTOLIC BLOOD PRESSURE: 130 MMHG | RESPIRATION RATE: 18 BRPM | TEMPERATURE: 98 F | BODY MASS INDEX: 20.25 KG/M2 | HEART RATE: 73 BPM | DIASTOLIC BLOOD PRESSURE: 80 MMHG

## 2017-10-15 SDOH — ECONOMIC STABILITY: HOUSING INSECURITY: UNSAFE COOKING RANGE AREA: 0

## 2017-10-15 SDOH — ECONOMIC STABILITY: HOUSING INSECURITY: UNSAFE APPLIANCES: 0

## 2017-10-15 ASSESSMENT — ENCOUNTER SYMPTOMS: RESPIRATORY SYMPTOMS COMMENTS: NO S/S OF RESP DISTRESS

## 2017-10-16 ENCOUNTER — HOME CARE VISIT (OUTPATIENT)
Dept: HOME HEALTH SERVICES | Facility: HOME HEALTHCARE | Age: 82
End: 2017-10-16
Payer: MEDICARE

## 2017-10-16 VITALS
HEART RATE: 67 BPM | RESPIRATION RATE: 20 BRPM | WEIGHT: 119 LBS | SYSTOLIC BLOOD PRESSURE: 100 MMHG | DIASTOLIC BLOOD PRESSURE: 70 MMHG | TEMPERATURE: 97.3 F | OXYGEN SATURATION: 96 % | BODY MASS INDEX: 20.43 KG/M2

## 2017-10-16 PROCEDURE — G0299 HHS/HOSPICE OF RN EA 15 MIN: HCPCS

## 2017-10-16 SDOH — ECONOMIC STABILITY: HOUSING INSECURITY: UNSAFE APPLIANCES: 0

## 2017-10-16 SDOH — ECONOMIC STABILITY: HOUSING INSECURITY: UNSAFE COOKING RANGE AREA: 0

## 2017-10-16 ASSESSMENT — ENCOUNTER SYMPTOMS
RESPIRATORY SYMPTOMS COMMENTS: NO S/S OF RESP DISTRESS
DEBILITATING PAIN: 1

## 2017-10-19 ENCOUNTER — HOME CARE VISIT (OUTPATIENT)
Dept: HOME HEALTH SERVICES | Facility: HOME HEALTHCARE | Age: 82
End: 2017-10-19
Payer: MEDICARE

## 2017-10-19 VITALS
TEMPERATURE: 98.2 F | RESPIRATION RATE: 16 BRPM | OXYGEN SATURATION: 94 % | DIASTOLIC BLOOD PRESSURE: 60 MMHG | SYSTOLIC BLOOD PRESSURE: 104 MMHG | HEART RATE: 64 BPM

## 2017-10-19 PROCEDURE — G0299 HHS/HOSPICE OF RN EA 15 MIN: HCPCS

## 2017-10-19 SDOH — ECONOMIC STABILITY: HOUSING INSECURITY: UNSAFE APPLIANCES: 0

## 2017-10-19 SDOH — ECONOMIC STABILITY: HOUSING INSECURITY: UNSAFE COOKING RANGE AREA: 0

## 2017-10-19 ASSESSMENT — ENCOUNTER SYMPTOMS
NAUSEA: DENIES
VOMITING: DENIES

## 2017-10-23 ENCOUNTER — HOME CARE VISIT (OUTPATIENT)
Dept: HOME HEALTH SERVICES | Facility: HOME HEALTHCARE | Age: 82
End: 2017-10-23
Payer: MEDICARE

## 2017-10-23 VITALS
RESPIRATION RATE: 16 BRPM | DIASTOLIC BLOOD PRESSURE: 50 MMHG | TEMPERATURE: 97.8 F | HEART RATE: 54 BPM | SYSTOLIC BLOOD PRESSURE: 100 MMHG | OXYGEN SATURATION: 97 %

## 2017-10-23 PROCEDURE — G0299 HHS/HOSPICE OF RN EA 15 MIN: HCPCS

## 2017-10-23 SDOH — ECONOMIC STABILITY: HOUSING INSECURITY: UNSAFE APPLIANCES: 0

## 2017-10-23 SDOH — ECONOMIC STABILITY: HOUSING INSECURITY: UNSAFE COOKING RANGE AREA: 0

## 2017-10-23 ASSESSMENT — ENCOUNTER SYMPTOMS
VOMITING: DENIES
NAUSEA: DENIES

## 2017-10-26 ENCOUNTER — HOME CARE VISIT (OUTPATIENT)
Dept: HOME HEALTH SERVICES | Facility: HOME HEALTHCARE | Age: 82
End: 2017-10-26
Payer: MEDICARE

## 2017-10-26 PROCEDURE — G0299 HHS/HOSPICE OF RN EA 15 MIN: HCPCS

## 2017-10-27 VITALS
DIASTOLIC BLOOD PRESSURE: 64 MMHG | TEMPERATURE: 98 F | SYSTOLIC BLOOD PRESSURE: 110 MMHG | RESPIRATION RATE: 16 BRPM | OXYGEN SATURATION: 96 % | HEART RATE: 70 BPM

## 2017-10-27 SDOH — ECONOMIC STABILITY: HOUSING INSECURITY: UNSAFE COOKING RANGE AREA: 0

## 2017-10-27 SDOH — ECONOMIC STABILITY: HOUSING INSECURITY: UNSAFE APPLIANCES: 0

## 2017-10-27 ASSESSMENT — ENCOUNTER SYMPTOMS
NAUSEA: DENIES
VOMITING: DENIES

## 2017-10-31 ENCOUNTER — HOME CARE VISIT (OUTPATIENT)
Dept: HOME HEALTH SERVICES | Facility: HOME HEALTHCARE | Age: 82
End: 2017-10-31
Payer: MEDICARE

## 2017-10-31 VITALS
OXYGEN SATURATION: 97 % | DIASTOLIC BLOOD PRESSURE: 64 MMHG | RESPIRATION RATE: 16 BRPM | TEMPERATURE: 98.2 F | HEART RATE: 68 BPM | SYSTOLIC BLOOD PRESSURE: 140 MMHG

## 2017-10-31 PROCEDURE — G0496 LPN CARE TRAIN/EDU IN HH: HCPCS

## 2017-10-31 SDOH — ECONOMIC STABILITY: HOUSING INSECURITY: UNSAFE COOKING RANGE AREA: 0

## 2017-10-31 SDOH — ECONOMIC STABILITY: HOUSING INSECURITY: UNSAFE APPLIANCES: 0

## 2017-11-07 ENCOUNTER — HOME CARE VISIT (OUTPATIENT)
Dept: HOME HEALTH SERVICES | Facility: HOME HEALTHCARE | Age: 82
End: 2017-11-07
Payer: MEDICARE

## 2017-11-07 VITALS
RESPIRATION RATE: 16 BRPM | OXYGEN SATURATION: 98 % | SYSTOLIC BLOOD PRESSURE: 130 MMHG | HEART RATE: 54 BPM | TEMPERATURE: 97.8 F | DIASTOLIC BLOOD PRESSURE: 70 MMHG

## 2017-11-07 PROCEDURE — G0162 HHC RN E&M PLAN SVS, 15 MIN: HCPCS

## 2017-11-07 SDOH — ECONOMIC STABILITY: HOUSING INSECURITY: UNSAFE APPLIANCES: 0

## 2017-11-07 SDOH — ECONOMIC STABILITY: HOUSING INSECURITY: UNSAFE COOKING RANGE AREA: 0

## 2017-11-07 ASSESSMENT — ACTIVITIES OF DAILY LIVING (ADL)
HOME_HEALTH_OASIS: 00
OASIS_M1830: 00
HOME_HEALTH_OASIS: 01

## 2018-07-16 ENCOUNTER — NON-PROVIDER VISIT (OUTPATIENT)
Dept: URGENT CARE | Facility: CLINIC | Age: 83
End: 2018-07-16

## 2018-07-16 DIAGNOSIS — Z11.1 ENCOUNTER FOR PPD TEST: ICD-10-CM

## 2018-07-16 PROCEDURE — 86580 TB INTRADERMAL TEST: CPT | Performed by: NURSE PRACTITIONER

## 2018-07-19 ENCOUNTER — NON-PROVIDER VISIT (OUTPATIENT)
Dept: URGENT CARE | Facility: CLINIC | Age: 83
End: 2018-07-19

## 2018-07-19 LAB — TB WHEAL 3D P 5 TU DIAM: NORMAL MM

## 2018-08-25 ENCOUNTER — OFFICE VISIT (OUTPATIENT)
Dept: URGENT CARE | Facility: CLINIC | Age: 83
End: 2018-08-25
Payer: MEDICARE

## 2018-08-25 VITALS
HEIGHT: 66 IN | TEMPERATURE: 97.1 F | BODY MASS INDEX: 20.09 KG/M2 | DIASTOLIC BLOOD PRESSURE: 60 MMHG | SYSTOLIC BLOOD PRESSURE: 140 MMHG | RESPIRATION RATE: 18 BRPM | WEIGHT: 125 LBS | HEART RATE: 70 BPM | OXYGEN SATURATION: 96 %

## 2018-08-25 DIAGNOSIS — G44.89 OTHER HEADACHE SYNDROME: ICD-10-CM

## 2018-08-25 PROCEDURE — 99214 OFFICE O/P EST MOD 30 MIN: CPT | Performed by: PHYSICIAN ASSISTANT

## 2018-08-25 ASSESSMENT — ENCOUNTER SYMPTOMS
MIGRAINE HEADACHES: 0
EYES NEGATIVE: 1
FOCAL WEAKNESS: 0
BLURRED VISION: 0
GASTROINTESTINAL NEGATIVE: 1
MUSCULOSKELETAL NEGATIVE: 1
RESPIRATORY NEGATIVE: 1
HEADACHES: 1
SENSORY CHANGE: 0
DIZZINESS: 0
CARDIOVASCULAR NEGATIVE: 1
CONSTITUTIONAL NEGATIVE: 1
FEVER: 0

## 2018-08-25 NOTE — PROGRESS NOTES
"Subjective:      Mila Scanlon is a 89 y.o. female who presents with Headache (head pressure, blood pressure irregular)            Headache    This is a new (mild; ?elev BP; no CP) problem. The current episode started today. The problem occurs constantly. The problem has been unchanged. The pain does not radiate. The quality of the pain is described as aching. The pain is mild. Pertinent negatives include no blurred vision, dizziness or fever. Nothing aggravates the symptoms. She has tried nothing for the symptoms. The treatment provided mild relief. There is no history of migraine headaches.       Review of Systems   Constitutional: Negative.  Negative for fever.   HENT: Negative.    Eyes: Negative.  Negative for blurred vision.   Respiratory: Negative.    Cardiovascular: Negative.    Gastrointestinal: Negative.    Musculoskeletal: Negative.    Skin: Negative.    Neurological: Positive for headaches. Negative for dizziness, sensory change and focal weakness.          Objective:     /60   Pulse 70   Temp 36.2 °C (97.1 °F)   Resp 18   Ht 1.676 m (5' 6\")   Wt 56.7 kg (125 lb)   SpO2 96%   BMI 20.18 kg/m²      Physical Exam   Constitutional: She is oriented to person, place, and time. She appears well-developed and well-nourished. No distress.   HENT:   Head: Normocephalic and atraumatic.   Mouth/Throat: Oropharynx is clear and moist.   Eyes: Pupils are equal, round, and reactive to light. Conjunctivae and EOM are normal.   Neck: Normal range of motion. Neck supple.   Cardiovascular: Normal rate, regular rhythm and normal heart sounds.    No murmur heard.  Pulmonary/Chest: Effort normal and breath sounds normal. No respiratory distress.   Lymphadenopathy:     She has no cervical adenopathy.   Neurological: She is alert and oriented to person, place, and time.   Skin: Skin is warm and dry. Capillary refill takes less than 2 seconds.   Psychiatric: She has a normal mood and affect. Her behavior is normal. "   Nursing note and vitals reviewed.    Active Ambulatory Problems     Diagnosis Date Noted   • Back pain 05/16/2016   • Weakness 05/16/2016   • Cellulitis 07/05/2017     Resolved Ambulatory Problems     Diagnosis Date Noted   • No Resolved Ambulatory Problems     Past Medical History:   Diagnosis Date   • Anxiety    • Anxiety    • Arthritis    • Back pain    • Carotid artery obstruction 2008   • Cataract    • High cholesterol    • Hypertension    • IBS (irritable bowel syndrome)    • Kidney stones 2008   • Skin cancer 2013     Current Outpatient Prescriptions on File Prior to Visit   Medication Sig Dispense Refill   • lisinopril (PRINIVIL) 5 MG Tab Take 2.5 mg by mouth every day.     • lovastatin (MEVACOR) 40 MG tablet Take 20 mg by mouth every evening.     • acetaminophen (TYLENOL) 500 MG Tab Take 500 mg by mouth every 6 hours as needed for Moderate Pain.       No current facility-administered medications on file prior to visit.      Social History     Social History   • Marital status:      Spouse name: N/A   • Number of children: N/A   • Years of education: N/A     Occupational History   • Not on file.     Social History Main Topics   • Smoking status: Former Smoker     Quit date: 10/1/2015   • Smokeless tobacco: Never Used   • Alcohol use No   • Drug use: No   • Sexual activity: Not Currently     Other Topics Concern   • Not on file     Social History Narrative   • No narrative on file     Family History   Problem Relation Age of Onset   • Heart Disease Mother    • Heart Disease Sister      Ciprofloxacin            Assessment/Plan:     ·  mild ha; nl BP [but labile]      · Observe; monitor bP; tylenol prn; trial claritin  · No ER indic  · F/u PCP

## 2019-01-05 ENCOUNTER — OFFICE VISIT (OUTPATIENT)
Dept: URGENT CARE | Facility: CLINIC | Age: 84
End: 2019-01-05
Payer: MEDICARE

## 2019-01-05 VITALS
BODY MASS INDEX: 20.09 KG/M2 | RESPIRATION RATE: 16 BRPM | HEIGHT: 66 IN | DIASTOLIC BLOOD PRESSURE: 82 MMHG | OXYGEN SATURATION: 95 % | SYSTOLIC BLOOD PRESSURE: 140 MMHG | TEMPERATURE: 97.8 F | WEIGHT: 125 LBS | HEART RATE: 60 BPM

## 2019-01-05 DIAGNOSIS — R05.9 COUGH: ICD-10-CM

## 2019-01-05 DIAGNOSIS — J00 NASOPHARYNGITIS: ICD-10-CM

## 2019-01-05 LAB
FLUAV+FLUBV AG SPEC QL IA: NEGATIVE
INT CON NEG: NEGATIVE
INT CON POS: POSITIVE

## 2019-01-05 PROCEDURE — 99214 OFFICE O/P EST MOD 30 MIN: CPT | Performed by: PHYSICIAN ASSISTANT

## 2019-01-05 PROCEDURE — 87804 INFLUENZA ASSAY W/OPTIC: CPT | Performed by: PHYSICIAN ASSISTANT

## 2019-01-05 RX ORDER — CEFPROZIL 250 MG/5ML
500 POWDER, FOR SUSPENSION ORAL 2 TIMES DAILY
Qty: 140 ML | Refills: 0 | Status: SHIPPED | OUTPATIENT
Start: 2019-01-05 | End: 2019-01-12

## 2019-01-05 ASSESSMENT — ENCOUNTER SYMPTOMS
FEVER: 0
SORE THROAT: 1
SINUS PAIN: 1
CONSTITUTIONAL NEGATIVE: 1
SHORTNESS OF BREATH: 0
MYALGIAS: 1
WHEEZING: 0
CARDIOVASCULAR NEGATIVE: 1
EYES NEGATIVE: 1
TROUBLE SWALLOWING: 1
SWOLLEN GLANDS: 1
COUGH: 1

## 2019-01-05 ASSESSMENT — COPD QUESTIONNAIRES: COPD: 0

## 2019-01-05 NOTE — PROGRESS NOTES
"Subjective:      Mila Scanlon is a 89 y.o. female who presents with Cough (cough, fever, bodyaches, fatigue x 1 week)            Cough   This is a new (mild cough (more from sore throat), ; no sob/fever) problem. The current episode started in the past 7 days. The problem has been unchanged. The problem occurs every few minutes. The cough is productive of sputum. Associated symptoms include myalgias and a sore throat. Pertinent negatives include no ear pain, fever, shortness of breath or wheezing. Nothing aggravates the symptoms. She has tried nothing for the symptoms. The treatment provided no relief. There is no history of COPD.   Pharyngitis    This is a new (ST, sinus pressure/dc) problem. The current episode started in the past 7 days. The problem has been unchanged. Neither side of throat is experiencing more pain than the other. There has been no fever. The pain is moderate. Associated symptoms include congestion, coughing, swollen glands and trouble swallowing. Pertinent negatives include no ear pain or shortness of breath. She has had no exposure to strep. She has tried nothing for the symptoms. The treatment provided no relief.       Review of Systems   Constitutional: Negative.  Negative for fever.   HENT: Positive for congestion, sinus pain, sore throat and trouble swallowing. Negative for ear pain.    Eyes: Negative.    Respiratory: Positive for cough. Negative for shortness of breath and wheezing.    Cardiovascular: Negative.    Musculoskeletal: Positive for myalgias.   Skin: Negative.    All other systems reviewed and are negative.         Objective:     /82 (BP Location: Right arm, Patient Position: Sitting, BP Cuff Size: Adult)   Pulse 60   Temp 36.6 °C (97.8 °F) (Temporal)   Resp 16   Ht 1.676 m (5' 5.98\")   Wt 56.7 kg (125 lb)   SpO2 95%   BMI 20.19 kg/m²      Physical Exam   Constitutional: She is oriented to person, place, and time. She appears well-developed and well-nourished. No " distress.   HENT:   Head: Normocephalic and atraumatic.   Mouth/Throat: Oropharynx is clear and moist. No oropharyngeal exudate.   +maxill.sinus press.  +phar./tons redn       Eyes: Pupils are equal, round, and reactive to light. Conjunctivae and EOM are normal.   Neck: Normal range of motion. Neck supple.   Cardiovascular: Normal rate, regular rhythm and normal heart sounds.    Pulmonary/Chest: Effort normal and breath sounds normal. No respiratory distress. She has no wheezes. She has no rales.   Lymphadenopathy:     She has no cervical adenopathy.   Neurological: She is alert and oriented to person, place, and time.   Skin: Skin is warm and dry. No rash noted.   Psychiatric: She has a normal mood and affect. Her behavior is normal.   Nursing note and vitals reviewed.    Active Ambulatory Problems     Diagnosis Date Noted   • Back pain 05/16/2016   • Weakness 05/16/2016   • Cellulitis 07/05/2017     Resolved Ambulatory Problems     Diagnosis Date Noted   • No Resolved Ambulatory Problems     Past Medical History:   Diagnosis Date   • Anxiety    • Anxiety    • Arthritis    • Back pain    • Carotid artery obstruction 2008   • Cataract    • High cholesterol    • Hypertension    • IBS (irritable bowel syndrome)    • Kidney stones 2008   • Skin cancer 2013     Current Outpatient Prescriptions on File Prior to Visit   Medication Sig Dispense Refill   • lisinopril (PRINIVIL) 5 MG Tab Take 2.5 mg by mouth every day.     • lovastatin (MEVACOR) 40 MG tablet Take 20 mg by mouth every evening.     • acetaminophen (TYLENOL) 500 MG Tab Take 500 mg by mouth every 6 hours as needed for Moderate Pain.       No current facility-administered medications on file prior to visit.      Social History     Social History   • Marital status:      Spouse name: N/A   • Number of children: N/A   • Years of education: N/A     Occupational History   • Not on file.     Social History Main Topics   • Smoking status: Former Smoker     Quit  date: 10/1/2015   • Smokeless tobacco: Never Used   • Alcohol use No   • Drug use: No   • Sexual activity: Not Currently     Other Topics Concern   • Not on file     Social History Narrative   • No narrative on file     Family History   Problem Relation Age of Onset   • Heart Disease Mother    • Heart Disease Sister      Ciprofloxacin       flu = neg         Assessment/Plan:     1. Cough [mild; more ST/nasopharyngitis sx]    - POCT Influenza A/B    · rx abx; otc prn

## 2019-02-02 ENCOUNTER — HOSPITAL ENCOUNTER (INPATIENT)
Facility: MEDICAL CENTER | Age: 84
LOS: 1 days | DRG: 203 | End: 2019-02-03
Attending: EMERGENCY MEDICINE | Admitting: FAMILY MEDICINE
Payer: MEDICARE

## 2019-02-02 ENCOUNTER — APPOINTMENT (OUTPATIENT)
Dept: RADIOLOGY | Facility: MEDICAL CENTER | Age: 84
DRG: 203 | End: 2019-02-02
Attending: EMERGENCY MEDICINE
Payer: MEDICARE

## 2019-02-02 DIAGNOSIS — J18.9 PNEUMONIA OF LOWER LOBE DUE TO INFECTIOUS ORGANISM, UNSPECIFIED LATERALITY: ICD-10-CM

## 2019-02-02 DIAGNOSIS — R53.1 GENERALIZED WEAKNESS: ICD-10-CM

## 2019-02-02 DIAGNOSIS — R09.02 HYPOXIA: ICD-10-CM

## 2019-02-02 LAB
ALBUMIN SERPL BCP-MCNC: 3.7 G/DL (ref 3.2–4.9)
ALBUMIN/GLOB SERPL: 1.2 G/DL
ALP SERPL-CCNC: 111 U/L (ref 30–99)
ALT SERPL-CCNC: 9 U/L (ref 2–50)
ANION GAP SERPL CALC-SCNC: 10 MMOL/L (ref 0–11.9)
APPEARANCE UR: CLEAR
AST SERPL-CCNC: 18 U/L (ref 12–45)
BACTERIA #/AREA URNS HPF: NEGATIVE /HPF
BASOPHILS # BLD AUTO: 0.9 % (ref 0–1.8)
BASOPHILS # BLD: 0.06 K/UL (ref 0–0.12)
BILIRUB SERPL-MCNC: 0.4 MG/DL (ref 0.1–1.5)
BILIRUB UR QL STRIP.AUTO: NEGATIVE
BUN SERPL-MCNC: 23 MG/DL (ref 8–22)
CALCIUM SERPL-MCNC: 8.8 MG/DL (ref 8.5–10.5)
CHLORIDE SERPL-SCNC: 110 MMOL/L (ref 96–112)
CO2 SERPL-SCNC: 21 MMOL/L (ref 20–33)
COLOR UR: YELLOW
CREAT SERPL-MCNC: 0.86 MG/DL (ref 0.5–1.4)
EOSINOPHIL # BLD AUTO: 0.18 K/UL (ref 0–0.51)
EOSINOPHIL NFR BLD: 2.6 % (ref 0–6.9)
EPI CELLS #/AREA URNS HPF: NEGATIVE /HPF
ERYTHROCYTE [DISTWIDTH] IN BLOOD BY AUTOMATED COUNT: 49.5 FL (ref 35.9–50)
FLUAV RNA SPEC QL NAA+PROBE: NEGATIVE
FLUBV RNA SPEC QL NAA+PROBE: NEGATIVE
GLOBULIN SER CALC-MCNC: 3.2 G/DL (ref 1.9–3.5)
GLUCOSE SERPL-MCNC: 95 MG/DL (ref 65–99)
GLUCOSE UR STRIP.AUTO-MCNC: NEGATIVE MG/DL
HCT VFR BLD AUTO: 37.3 % (ref 37–47)
HGB BLD-MCNC: 11.6 G/DL (ref 12–16)
HYALINE CASTS #/AREA URNS LPF: ABNORMAL /LPF
IMM GRANULOCYTES # BLD AUTO: 0.02 K/UL (ref 0–0.11)
IMM GRANULOCYTES NFR BLD AUTO: 0.3 % (ref 0–0.9)
KETONES UR STRIP.AUTO-MCNC: NEGATIVE MG/DL
LACTATE BLD-SCNC: 1.2 MMOL/L (ref 0.5–2)
LEUKOCYTE ESTERASE UR QL STRIP.AUTO: NEGATIVE
LYMPHOCYTES # BLD AUTO: 0.82 K/UL (ref 1–4.8)
LYMPHOCYTES NFR BLD: 12.1 % (ref 22–41)
MCH RBC QN AUTO: 30.1 PG (ref 27–33)
MCHC RBC AUTO-ENTMCNC: 31.1 G/DL (ref 33.6–35)
MCV RBC AUTO: 96.6 FL (ref 81.4–97.8)
MICRO URNS: ABNORMAL
MONOCYTES # BLD AUTO: 0.57 K/UL (ref 0–0.85)
MONOCYTES NFR BLD AUTO: 8.4 % (ref 0–13.4)
NEUTROPHILS # BLD AUTO: 5.15 K/UL (ref 2–7.15)
NEUTROPHILS NFR BLD: 75.7 % (ref 44–72)
NITRITE UR QL STRIP.AUTO: NEGATIVE
NRBC # BLD AUTO: 0 K/UL
NRBC BLD-RTO: 0 /100 WBC
PH UR STRIP.AUTO: 6 [PH]
PLATELET # BLD AUTO: 249 K/UL (ref 164–446)
PMV BLD AUTO: 10.7 FL (ref 9–12.9)
POTASSIUM SERPL-SCNC: 3.7 MMOL/L (ref 3.6–5.5)
PROT SERPL-MCNC: 6.9 G/DL (ref 6–8.2)
PROT UR QL STRIP: NEGATIVE MG/DL
RBC # BLD AUTO: 3.86 M/UL (ref 4.2–5.4)
RBC # URNS HPF: ABNORMAL /HPF
RBC UR QL AUTO: ABNORMAL
SODIUM SERPL-SCNC: 141 MMOL/L (ref 135–145)
SP GR UR STRIP.AUTO: 1.02
UROBILINOGEN UR STRIP.AUTO-MCNC: 0.2 MG/DL
WBC # BLD AUTO: 6.8 K/UL (ref 4.8–10.8)
WBC #/AREA URNS HPF: ABNORMAL /HPF

## 2019-02-02 PROCEDURE — 94760 N-INVAS EAR/PLS OXIMETRY 1: CPT

## 2019-02-02 PROCEDURE — 87040 BLOOD CULTURE FOR BACTERIA: CPT

## 2019-02-02 PROCEDURE — 99285 EMERGENCY DEPT VISIT HI MDM: CPT

## 2019-02-02 PROCEDURE — 700105 HCHG RX REV CODE 258: Performed by: EMERGENCY MEDICINE

## 2019-02-02 PROCEDURE — 700111 HCHG RX REV CODE 636 W/ 250 OVERRIDE (IP): Performed by: STUDENT IN AN ORGANIZED HEALTH CARE EDUCATION/TRAINING PROGRAM

## 2019-02-02 PROCEDURE — A9270 NON-COVERED ITEM OR SERVICE: HCPCS | Performed by: STUDENT IN AN ORGANIZED HEALTH CARE EDUCATION/TRAINING PROGRAM

## 2019-02-02 PROCEDURE — 96365 THER/PROPH/DIAG IV INF INIT: CPT

## 2019-02-02 PROCEDURE — 700102 HCHG RX REV CODE 250 W/ 637 OVERRIDE(OP): Performed by: STUDENT IN AN ORGANIZED HEALTH CARE EDUCATION/TRAINING PROGRAM

## 2019-02-02 PROCEDURE — 87502 INFLUENZA DNA AMP PROBE: CPT

## 2019-02-02 PROCEDURE — 71045 X-RAY EXAM CHEST 1 VIEW: CPT

## 2019-02-02 PROCEDURE — 81001 URINALYSIS AUTO W/SCOPE: CPT

## 2019-02-02 PROCEDURE — 85025 COMPLETE CBC W/AUTO DIFF WBC: CPT

## 2019-02-02 PROCEDURE — 770006 HCHG ROOM/CARE - MED/SURG/GYN SEMI*

## 2019-02-02 PROCEDURE — 700111 HCHG RX REV CODE 636 W/ 250 OVERRIDE (IP): Performed by: EMERGENCY MEDICINE

## 2019-02-02 PROCEDURE — 83605 ASSAY OF LACTIC ACID: CPT

## 2019-02-02 PROCEDURE — 700105 HCHG RX REV CODE 258: Performed by: STUDENT IN AN ORGANIZED HEALTH CARE EDUCATION/TRAINING PROGRAM

## 2019-02-02 PROCEDURE — 80053 COMPREHEN METABOLIC PANEL: CPT

## 2019-02-02 PROCEDURE — 96367 TX/PROPH/DG ADDL SEQ IV INF: CPT

## 2019-02-02 PROCEDURE — 96361 HYDRATE IV INFUSION ADD-ON: CPT

## 2019-02-02 PROCEDURE — 304561 HCHG STAT O2

## 2019-02-02 RX ORDER — BISACODYL 10 MG
10 SUPPOSITORY, RECTAL RECTAL
Status: DISCONTINUED | OUTPATIENT
Start: 2019-02-02 | End: 2019-02-03 | Stop reason: HOSPADM

## 2019-02-02 RX ORDER — HEPARIN SODIUM 5000 [USP'U]/ML
5000 INJECTION, SOLUTION INTRAVENOUS; SUBCUTANEOUS EVERY 8 HOURS
Status: DISCONTINUED | OUTPATIENT
Start: 2019-02-02 | End: 2019-02-03 | Stop reason: HOSPADM

## 2019-02-02 RX ORDER — SODIUM CHLORIDE 9 MG/ML
INJECTION, SOLUTION INTRAVENOUS CONTINUOUS
Status: DISCONTINUED | OUTPATIENT
Start: 2019-02-02 | End: 2019-02-03 | Stop reason: HOSPADM

## 2019-02-02 RX ORDER — ACETAMINOPHEN 500 MG
500 TABLET ORAL EVERY 6 HOURS PRN
Status: DISCONTINUED | OUTPATIENT
Start: 2019-02-02 | End: 2019-02-03 | Stop reason: HOSPADM

## 2019-02-02 RX ORDER — AMOXICILLIN 250 MG
2 CAPSULE ORAL 2 TIMES DAILY
Status: DISCONTINUED | OUTPATIENT
Start: 2019-02-02 | End: 2019-02-03 | Stop reason: HOSPADM

## 2019-02-02 RX ORDER — LOVASTATIN 20 MG/1
20 TABLET ORAL EVERY EVENING
Status: DISCONTINUED | OUTPATIENT
Start: 2019-02-02 | End: 2019-02-03 | Stop reason: HOSPADM

## 2019-02-02 RX ORDER — ENALAPRILAT 1.25 MG/ML
1.25 INJECTION INTRAVENOUS EVERY 6 HOURS PRN
Status: DISCONTINUED | OUTPATIENT
Start: 2019-02-02 | End: 2019-02-03 | Stop reason: HOSPADM

## 2019-02-02 RX ORDER — LOVASTATIN 20 MG/1
20 TABLET ORAL DAILY
Refills: 3 | COMMUNITY
Start: 2018-12-01

## 2019-02-02 RX ORDER — AZITHROMYCIN 500 MG/1
500 INJECTION, POWDER, LYOPHILIZED, FOR SOLUTION INTRAVENOUS ONCE
Status: COMPLETED | OUTPATIENT
Start: 2019-02-02 | End: 2019-02-02

## 2019-02-02 RX ORDER — SODIUM CHLORIDE 9 MG/ML
1000 INJECTION, SOLUTION INTRAVENOUS ONCE
Status: COMPLETED | OUTPATIENT
Start: 2019-02-02 | End: 2019-02-02

## 2019-02-02 RX ORDER — POLYETHYLENE GLYCOL 3350 17 G/17G
1 POWDER, FOR SOLUTION ORAL
Status: DISCONTINUED | OUTPATIENT
Start: 2019-02-02 | End: 2019-02-03 | Stop reason: HOSPADM

## 2019-02-02 RX ADMIN — SODIUM CHLORIDE: 9 INJECTION, SOLUTION INTRAVENOUS at 17:30

## 2019-02-02 RX ADMIN — SODIUM CHLORIDE 1000 ML: 9 INJECTION, SOLUTION INTRAVENOUS at 08:32

## 2019-02-02 RX ADMIN — AZITHROMYCIN FOR INJECTION INJECTION, POWDER, LYOPHILIZED, FOR SOLUTION 500 MG: 500 INJECTION INTRAVENOUS at 15:38

## 2019-02-02 RX ADMIN — LOVASTATIN 20 MG: 20 TABLET ORAL at 17:33

## 2019-02-02 RX ADMIN — HEPARIN SODIUM 5000 UNITS: 5000 INJECTION, SOLUTION INTRAVENOUS; SUBCUTANEOUS at 17:33

## 2019-02-02 RX ADMIN — AMPICILLIN SODIUM AND SULBACTAM SODIUM 3 G: 2; 1 INJECTION, POWDER, FOR SOLUTION INTRAMUSCULAR; INTRAVENOUS at 13:36

## 2019-02-02 ASSESSMENT — COGNITIVE AND FUNCTIONAL STATUS - GENERAL
MOBILITY SCORE: 23
DAILY ACTIVITIY SCORE: 24
SUGGESTED CMS G CODE MODIFIER MOBILITY: CI
CLIMB 3 TO 5 STEPS WITH RAILING: A LITTLE
SUGGESTED CMS G CODE MODIFIER DAILY ACTIVITY: CH

## 2019-02-02 ASSESSMENT — PATIENT HEALTH QUESTIONNAIRE - PHQ9
1. LITTLE INTEREST OR PLEASURE IN DOING THINGS: NOT AT ALL
SUM OF ALL RESPONSES TO PHQ9 QUESTIONS 1 AND 2: 0
2. FEELING DOWN, DEPRESSED, IRRITABLE, OR HOPELESS: NOT AT ALL

## 2019-02-02 ASSESSMENT — COPD QUESTIONNAIRES
COPD SCREENING SCORE: 2
DURING THE PAST 4 WEEKS HOW MUCH DID YOU FEEL SHORT OF BREATH: NONE/LITTLE OF THE TIME
IN THE PAST 12 MONTHS DO YOU DO LESS THAN YOU USED TO BECAUSE OF YOUR BREATHING PROBLEMS: DISAGREE/UNSURE
DO YOU EVER COUGH UP ANY MUCUS OR PHLEGM?: NO/ONLY WITH OCCASIONAL COLDS OR INFECTIONS
HAVE YOU SMOKED AT LEAST 100 CIGARETTES IN YOUR ENTIRE LIFE: NO/DON'T KNOW

## 2019-02-02 ASSESSMENT — ENCOUNTER SYMPTOMS
BACK PAIN: 1
VOMITING: 0
HEADACHES: 0
CHILLS: 0
COUGH: 1
ABDOMINAL PAIN: 0
FEVER: 0
NAUSEA: 0

## 2019-02-02 ASSESSMENT — LIFESTYLE VARIABLES
EVER_SMOKED: YES
ALCOHOL_USE: NO

## 2019-02-02 NOTE — ED PROVIDER NOTES
"ED Provider Note    ED Provider Note    Primary care provider: Umberto Solis M.D.  Means of arrival: EMS  History obtained from: Patient, daughter  History limited by: None    CHIEF COMPLAINT  Chief Complaint   Patient presents with   • Flu Like Symptoms     x 1 week. non productive cough.        HPI  Mila Scanlon is a 89 y.o. female who presents to the Emergency Department via EMS.  She is accompanied in the ED by her daughter.  Patient lives at [facility], at a local assisted living center.  Daughter states that the facility called this morning to report that her mom had complained of cough, chest pain and back pain.  Her daughter states that she has had a cough and cold for about 3 or 4 weeks.  She saw her primary care doctor on January 11 had an EKG done and it was \"okay\".  She states she does not think it is her mother's heart.  She also was seen in the urgent care on January 1 with cold symptoms and had a negative flu swab.  Denies any chest pain or back pain at this time.  She states she just feels weak and crummy.  She has had difficulty sleeping secondary to congestion.  She reports a cough.  Daughter states she uses her walker and does well.  She has been eating well.  No nausea no vomiting.  Patient denies any urinary complaints.  No abdominal pain.  No recent trauma or falls.  She is overall quite healthy.  She is sharp.  She only takes a cholesterol medication.    REVIEW OF SYSTEMS  Review of Systems   Constitutional: Negative for chills and fever.   HENT: Positive for congestion.    Respiratory: Positive for cough.    Cardiovascular: Positive for chest pain.   Gastrointestinal: Negative for abdominal pain, nausea and vomiting.   Genitourinary: Negative for dysuria.   Musculoskeletal: Positive for back pain.   Neurological: Negative for headaches.   Psychiatric/Behavioral: Suicidal ideas: .n.   All other systems reviewed and are negative.      PAST MEDICAL HISTORY   has a past medical history of " "Anxiety; Anxiety; Arthritis; Back pain; Carotid artery obstruction (2008); Cataract; High cholesterol; Hypertension; IBS (irritable bowel syndrome); Kidney stones (2008); and Skin cancer (2013).    SURGICAL HISTORY   has a past surgical history that includes hysterectomy laparoscopy.    SOCIAL HISTORY  Social History   Substance Use Topics   • Smoking status: Former Smoker     Quit date: 10/1/2015   • Smokeless tobacco: Never Used   • Alcohol use No      History   Drug Use No       FAMILY HISTORY  Family History   Problem Relation Age of Onset   • Heart Disease Mother    • Heart Disease Sister        CURRENT MEDICATIONS  Home Medications     Reviewed by Shameka Moreau, Pharmacy Intern (Pharmacy Intern) on 02/02/19 at 1137  Med List Status: Complete   Medication Last Dose Status   acetaminophen (TYLENOL) 500 MG Tab 2/1/2019 Active   lovastatin (MEVACOR) 40 MG tablet 1/31/2019 Active                ALLERGIES  Allergies   Allergen Reactions   • Ciprofloxacin Nausea       PHYSICAL EXAM  VITAL SIGNS: BP (!) 161/76   Pulse 69   Temp 36.6 °C (97.8 °F) (Temporal)   Resp 18   Ht 1.626 m (5' 4\")   Wt 53.5 kg (118 lb)   SpO2 94%   BMI 20.25 kg/m²   Vitals reviewed.  Constitutional: Patient is oriented to person, place, and time. Appears well-developed and well-nourished.  Mild distress.    Head: Normocephalic and atraumatic.   Ears: Normal external ears bilaterally.   Mouth/Throat: Oropharynx is clear, with dry mucous membranes. no exudates.   Eyes: Conjunctivae are normal. Pupils are equal, round, and reactive to light.   Neck: Normal range of motion. Neck supple.  Cardiovascular: Normal rate, regular rhythm and normal heart sounds. Normal peripheral pulses.  Pulmonary/Chest: Effort normal.  Mild coarse breath sounds posteriorly. No respiratory distress, no wheezes, rhonchi, or rales. No chest wall tenderness.  Abdominal: Soft. Bowel sounds are normal. There is no tenderness. No rebound or guarding, or peritoneal " signs. No CVA tenderness.  Musculoskeletal: No edema and no tenderness.   Lymphadenopathy: No cervical adenopathy.   Neurological: No focal deficits.   Skin: Skin is warm and dry. No erythema. No pallor.   Psychiatric: Patient has a normal mood and affect.     LABS  Results for orders placed or performed during the hospital encounter of 02/02/19   CBC w/ Differential   Result Value Ref Range    WBC 6.8 4.8 - 10.8 K/uL    RBC 3.86 (L) 4.20 - 5.40 M/uL    Hemoglobin 11.6 (L) 12.0 - 16.0 g/dL    Hematocrit 37.3 37.0 - 47.0 %    MCV 96.6 81.4 - 97.8 fL    MCH 30.1 27.0 - 33.0 pg    MCHC 31.1 (L) 33.6 - 35.0 g/dL    RDW 49.5 35.9 - 50.0 fL    Platelet Count 249 164 - 446 K/uL    MPV 10.7 9.0 - 12.9 fL    Neutrophils-Polys 75.70 (H) 44.00 - 72.00 %    Lymphocytes 12.10 (L) 22.00 - 41.00 %    Monocytes 8.40 0.00 - 13.40 %    Eosinophils 2.60 0.00 - 6.90 %    Basophils 0.90 0.00 - 1.80 %    Immature Granulocytes 0.30 0.00 - 0.90 %    Nucleated RBC 0.00 /100 WBC    Neutrophils (Absolute) 5.15 2.00 - 7.15 K/uL    Lymphs (Absolute) 0.82 (L) 1.00 - 4.80 K/uL    Monos (Absolute) 0.57 0.00 - 0.85 K/uL    Eos (Absolute) 0.18 0.00 - 0.51 K/uL    Baso (Absolute) 0.06 0.00 - 0.12 K/uL    Immature Granulocytes (abs) 0.02 0.00 - 0.11 K/uL    NRBC (Absolute) 0.00 K/uL   Complete Metabolic Panel (CMP)   Result Value Ref Range    Sodium 141 135 - 145 mmol/L    Potassium 3.7 3.6 - 5.5 mmol/L    Chloride 110 96 - 112 mmol/L    Co2 21 20 - 33 mmol/L    Anion Gap 10.0 0.0 - 11.9    Glucose 95 65 - 99 mg/dL    Bun 23 (H) 8 - 22 mg/dL    Creatinine 0.86 0.50 - 1.40 mg/dL    Calcium 8.8 8.5 - 10.5 mg/dL    AST(SGOT) 18 12 - 45 U/L    ALT(SGPT) 9 2 - 50 U/L    Alkaline Phosphatase 111 (H) 30 - 99 U/L    Total Bilirubin 0.4 0.1 - 1.5 mg/dL    Albumin 3.7 3.2 - 4.9 g/dL    Total Protein 6.9 6.0 - 8.2 g/dL    Globulin 3.2 1.9 - 3.5 g/dL    A-G Ratio 1.2 g/dL   Influenza A/B By PCR   Result Value Ref Range    Influenza virus A RNA Negative Negative     Influenza virus B, PCR Negative Negative   URINALYSIS,CULTURE IF INDICATED   Result Value Ref Range    Color Yellow     Character Clear     Specific Gravity 1.016 <1.035    Ph 6.0 5.0 - 8.0    Glucose Negative Negative mg/dL    Ketones Negative Negative mg/dL    Protein Negative Negative mg/dL    Bilirubin Negative Negative    Urobilinogen, Urine 0.2 Negative    Nitrite Negative Negative    Leukocyte Esterase Negative Negative    Occult Blood Trace (A) Negative    Micro Urine Req Microscopic    ESTIMATED GFR   Result Value Ref Range    GFR If African American >60 >60 mL/min/1.73 m 2    GFR If Non African American >60 >60 mL/min/1.73 m 2   URINE MICROSCOPIC (W/UA)   Result Value Ref Range    WBC 0-2 /hpf    RBC 5-10 (A) /hpf    Bacteria Negative None /hpf    Epithelial Cells Negative /hpf    Hyaline Cast 0-2 /lpf       All labs reviewed by me.      RADIOLOGY  DX-CHEST-PORTABLE (1 VIEW)   Final Result      There is slightly increased increased interstitial opacity which may represent interstitial edema or pneumonia. No lobar consolidation is present.        The radiologist's interpretation of all radiological studies have been reviewed by me.    COURSE & MEDICAL DECISION MAKING  Pertinent Labs & Imaging studies reviewed. (See chart for details)    Obtained and reviewed past medical records.  Multiple outpatient encounters.  Last admission and ED visit in 2017, July    7:21 AM - Patient seen and examined at bedside.  This is a pleasant 89-year-old female who has had a cough for about a month.  Now she is experiencing generalized weakness and body aches.  This prompted a call from her assisted living facility to 911     The differential diagnoses include but are not limited to: Pneumonia, bronchitis, dehydration, UTI    10:11 AM, patient's reevaluated at the bedside.  He is feeling better although she still feels weak.  We discussed x-ray findings and lab results.  Will trial ambulation and recheck pulse ox and  disposition from there.    11:30 AM, patient was notably hypoxic, with ambulation, after return from ambulation.  At this time, given her age, her generalized weakness and ongoing symptoms, I recommend admission to the hospital and she is agreeable to this plan of care as is her daughter who is at the bedside.  Antibiotics have been initiated.    12:36 PM [facility] family medicine paged for admission.    12:48 PM discussed with [facility] family medicine resident who agrees to admit the patient to their service.      Patient is admitted in stable condition.      FINAL IMPRESSION  1. Pneumonia of lower lobe due to infectious organism, unspecified laterality (HCC)    2. Generalized weakness    3. Hypoxia

## 2019-02-02 NOTE — ED NOTES
Pt appears comfortable in room.  Pt is resting in bed. Tolerating PO intake well.   No visible signs or symptoms of distress noted at this time.  Pt denies needs or concerns at this time.   Will continue to monitor.

## 2019-02-02 NOTE — H&P
[facility]     PATIENT ID:  NAME:  Mila Scanlon  MRN:               [id number]  YOB: 1926    Date of Admission: 2/2/2019     Attending: Dr. Sabiha Leary    Resident: Dr. Manny Correa    Primary Care Physician:  Umberto Solis M.D.    CC: Cough, fatigue, weakness    HPI: Mila Scanlon is a 89 y.o. female who presented with one-month history of nonproductive cough, fatigue and weakness.  She was seen in urgent care 1/5/19 for similar symptoms, was prescribed a 5-day course of unknown antibiotic, likely azithromycin, which she said gave her little improvement.  She says that she continues to drink lots of water, however her appetite has been the increased.  She also had one episode a few days ago with feeling dizzy and like she was about to pass out, however after sitting down for a period of time this went away.  She denies any fevers, chills, nausea, vomiting, diarrhea, chest pain, shortness of breath, abdominal pain, changes in voiding or stooling.    ED course: Flu negative.  Chest x-ray showing slightly increased interstitial opacity, cannot rule out pneumonia versus interstitial edema.  Was initiated on Unasyn and azithromycin in the emergency department.  Patient was given a 1 L fluid bolus.  She was started on 2 L nasal cannula at presentation to the ED and was satting in the mid 90s on 2L O2 upon examination    REVIEW OF SYSTEMS:   Ten systems reviewed and were negative except as noted in the HPI.                PAST MEDICAL HISTORY:  Past Medical History:   Diagnosis Date   • Anxiety    • Anxiety    • Arthritis    • Back pain    • Carotid artery obstruction 2008   • Cataract    • High cholesterol    • Hypertension    • IBS (irritable bowel syndrome)    • Kidney stones 2008   • Skin cancer 2013       PAST SURGICAL HISTORY:  Past Surgical History:   Procedure Laterality Date   • HYSTERECTOMY LAPAROSCOPY         FAMILY HISTORY:  Family History   Problem  Relation Age of Onset   • Heart Disease Mother    • Heart Disease Sister        SOCIAL HISTORY:   Pt lives in a group home  Smoking 20-pack-year history of smoking quit 10 years ago  Etoh use rare  Drug use denies    DIET:   No orders of the defined types were placed in this encounter.      ALLERGIES:  Allergies   Allergen Reactions   • Ciprofloxacin Nausea       OUTPATIENT MEDICATIONS:    Current Facility-Administered Medications:   •  azithromycin (ZITHROMAX) injection 500 mg, 500 mg, Intravenous, Once, Priyanka Hanson D.O.    Current Outpatient Prescriptions:   •  lovastatin (MEVACOR) 20 MG Tab, Take 20 mg by mouth every day., Disp: , Rfl: 3  •  acetaminophen (TYLENOL) 500 MG Tab, Take 500 mg by mouth every 6 hours as needed for Moderate Pain., Disp: , Rfl:     PHYSICAL EXAM:  Vitals:    19 1400 19 1430 19 1445 19 1500   BP:       Pulse: 72      Resp:       Temp:       TempSrc:       SpO2: 95% 94% 91% 96%   Weight:       Height:       , Temp (24hrs), Av.6 °C (97.8 °F), Min:36.6 °C (97.8 °F), Max:36.6 °C (97.8 °F)  , Pulse Oximetry: 96 %, O2 (LPM): 2, O2 Delivery: Nasal Cannula    General: Pt resting in NAD, cooperative    Skin:  Pink, warm and dry.  No rashes  HEENT: NC/AT. PERRL. EOMI. MMM. No nasal discharge. Oropharynx nonerythematous without exudate/plaques  Neck:  Supple without lymphadenopathy or rigidity. No JVD   Lungs:  Symmetrical.  Mild coarse breath sounds posteriorly right> left  Cardiovascular:  S1/S2 RRR soft systolic murmur  Abdomen:  Abdomen is soft NT/ND. +BS. No masses noted.  Genitourinary:  Deferred.    Extremities:  Full range of motion. No gross deformities noted. 2+ pulses in all extremities. No C/C/E   Spine:  Straight without vertebral anomalies.  CNS:  Muscle tone is normal. Cranial nerves II-XII grossly intact. 2+ DTRs.         LAB TESTS:   Recent Labs      19   0821   WBC  6.8   RBC  3.86*   HEMOGLOBIN  11.6*   HEMATOCRIT  37.3   MCV  96.6   MCH  30.1  "  RDW  49.5   PLATELETCT  249   MPV  10.7   NEUTSPOLYS  75.70*   LYMPHOCYTES  12.10*   MONOCYTES  8.40   EOSINOPHILS  2.60   BASOPHILS  0.90         Recent Labs      02/02/19   0821   SODIUM  141   POTASSIUM  3.7   CHLORIDE  110   CO2  21   BUN  23*   CREATININE  0.86   CALCIUM  8.8   ALBUMIN  3.7       CULTURES:   Results     Procedure Component Value Units Date/Time    BLOOD CULTURE [[id number]] Collected:  02/02/19 1323    Order Status:  Completed Specimen:  Blood from Peripheral Updated:  02/02/19 1357    Narrative:       Per Hospital Policy: Only change Specimen Src: to \"Line\" if  specified by physician order.    BLOOD CULTURE [[id number]] Collected:  02/02/19 1343    Order Status:  Completed Specimen:  Blood from Peripheral Updated:  02/02/19 1356    Narrative:       Per Hospital Policy: Only change Specimen Src: to \"Line\" if  specified by physician order.    Influenza A/B By PCR [[id number]] Collected:  02/02/19 0828    Order Status:  Completed Specimen:  Nasal from Nasopharyngeal Updated:  02/02/19 0922     Influenza virus A RNA Negative     Influenza virus B, PCR Negative    URINALYSIS,CULTURE IF INDICATED [[id number]]  (Abnormal) Collected:  02/02/19 0900    Order Status:  Completed Specimen:  Urine from Urine, Cath Updated:  02/02/19 0918     Color Yellow     Character Clear     Specific Gravity 1.016     Ph 6.0     Glucose Negative mg/dL      Ketones Negative mg/dL      Protein Negative mg/dL      Bilirubin Negative     Urobilinogen, Urine 0.2     Nitrite Negative     Leukocyte Esterase Negative     Occult Blood Trace (A)     Micro Urine Req Microscopic          IMAGES:  DX-CHEST-PORTABLE (1 VIEW)   Final Result      There is slightly increased increased interstitial opacity which may represent interstitial edema or pneumonia. No lobar consolidation is present.            CONSULTS:   none    ASSESSMENT/PLAN: 89 y.o. female with PMH of chronic kidney stones admitted for hypoxia and weakness 2/2 bronchitis " versus pneumonia.    #Bronchitis versus pneumonia  #Hypoxia 2/2 above  -Patient requiring 2 L oxygen  -Continues to have mild cough, which is nonproductive  -No signs or symptoms of sepsis currently  -Continue O2 supplementation, weaning as tolerated to maintain saturations in the 90s  -We will continue Unasyn and azithromycin upon admission  -We will continue maintenance IV fluids  -Blood culture drawn in the emergency department is pending    #Weakness  -Likely due to her 1 month history of coughing  -We will order PT for evaluation and treatment  -Encourage p.o. intake  -Continue to monitor    Chronic conditions:  #Dyslipidemia  -We will continue outpatient lovastatin at this time, may discuss with outpatient providers if this continues to be necessary  #Hypertension  -Not currently on any antihypertensives  -Blood pressure 130s-160s/50s-70s  -PRN Vasotec ordered  -Continue to monitor        Quality Measures  Quality-Core Measures   Code: DNR  Antibiotics: Azithromycin and Unasyn  DVT P PX: Heparin  Lines: PIV  Diet: Regular  Fluids: NS at 83 cc/hour

## 2019-02-02 NOTE — ED NOTES
Ambulated pt in halls to restroom. Pt tolerated well. Pt O2 88-89 on RA. Placed back on 2L O2. MD made aware

## 2019-02-02 NOTE — ED NOTES
Lab called for Blood culture collection,  Pt appears comfortable in room.  Pt is resting in bed.  No visible signs or symptoms of distress noted at this time.  Pt denies needs or concerns at this time.   Will continue to monitor.

## 2019-02-03 VITALS
RESPIRATION RATE: 20 BRPM | WEIGHT: 123.9 LBS | TEMPERATURE: 97.4 F | BODY MASS INDEX: 20.64 KG/M2 | HEART RATE: 66 BPM | SYSTOLIC BLOOD PRESSURE: 135 MMHG | HEIGHT: 65 IN | DIASTOLIC BLOOD PRESSURE: 59 MMHG | OXYGEN SATURATION: 94 %

## 2019-02-03 PROBLEM — L03.90 CELLULITIS: Status: RESOLVED | Noted: 2017-07-05 | Resolved: 2019-02-03

## 2019-02-03 LAB
ANION GAP SERPL CALC-SCNC: 9 MMOL/L (ref 0–11.9)
BASOPHILS # BLD AUTO: 0.6 % (ref 0–1.8)
BASOPHILS # BLD: 0.04 K/UL (ref 0–0.12)
BUN SERPL-MCNC: 16 MG/DL (ref 8–22)
CALCIUM SERPL-MCNC: 8.2 MG/DL (ref 8.5–10.5)
CHLORIDE SERPL-SCNC: 111 MMOL/L (ref 96–112)
CO2 SERPL-SCNC: 22 MMOL/L (ref 20–33)
CREAT SERPL-MCNC: 0.79 MG/DL (ref 0.5–1.4)
EOSINOPHIL # BLD AUTO: 0.13 K/UL (ref 0–0.51)
EOSINOPHIL NFR BLD: 1.9 % (ref 0–6.9)
ERYTHROCYTE [DISTWIDTH] IN BLOOD BY AUTOMATED COUNT: 50.7 FL (ref 35.9–50)
GLUCOSE SERPL-MCNC: 100 MG/DL (ref 65–99)
HCT VFR BLD AUTO: 36.5 % (ref 37–47)
HGB BLD-MCNC: 11.1 G/DL (ref 12–16)
IMM GRANULOCYTES # BLD AUTO: 0.02 K/UL (ref 0–0.11)
IMM GRANULOCYTES NFR BLD AUTO: 0.3 % (ref 0–0.9)
LACTATE BLD-SCNC: 0.9 MMOL/L (ref 0.5–2)
LYMPHOCYTES # BLD AUTO: 0.7 K/UL (ref 1–4.8)
LYMPHOCYTES NFR BLD: 10 % (ref 22–41)
MCH RBC QN AUTO: 30 PG (ref 27–33)
MCHC RBC AUTO-ENTMCNC: 30.4 G/DL (ref 33.6–35)
MCV RBC AUTO: 98.6 FL (ref 81.4–97.8)
MONOCYTES # BLD AUTO: 0.63 K/UL (ref 0–0.85)
MONOCYTES NFR BLD AUTO: 9 % (ref 0–13.4)
NEUTROPHILS # BLD AUTO: 5.46 K/UL (ref 2–7.15)
NEUTROPHILS NFR BLD: 78.2 % (ref 44–72)
NRBC # BLD AUTO: 0 K/UL
NRBC BLD-RTO: 0 /100 WBC
PLATELET # BLD AUTO: 220 K/UL (ref 164–446)
PMV BLD AUTO: 10.5 FL (ref 9–12.9)
POTASSIUM SERPL-SCNC: 3.4 MMOL/L (ref 3.6–5.5)
RBC # BLD AUTO: 3.7 M/UL (ref 4.2–5.4)
SODIUM SERPL-SCNC: 142 MMOL/L (ref 135–145)
WBC # BLD AUTO: 7 K/UL (ref 4.8–10.8)

## 2019-02-03 PROCEDURE — 99285 EMERGENCY DEPT VISIT HI MDM: CPT

## 2019-02-03 PROCEDURE — 36415 COLL VENOUS BLD VENIPUNCTURE: CPT

## 2019-02-03 PROCEDURE — 304561 HCHG STAT O2

## 2019-02-03 PROCEDURE — 80048 BASIC METABOLIC PNL TOTAL CA: CPT

## 2019-02-03 PROCEDURE — 83605 ASSAY OF LACTIC ACID: CPT

## 2019-02-03 PROCEDURE — 85025 COMPLETE CBC W/AUTO DIFF WBC: CPT

## 2019-02-03 PROCEDURE — 700111 HCHG RX REV CODE 636 W/ 250 OVERRIDE (IP): Performed by: STUDENT IN AN ORGANIZED HEALTH CARE EDUCATION/TRAINING PROGRAM

## 2019-02-03 PROCEDURE — 700105 HCHG RX REV CODE 258: Performed by: STUDENT IN AN ORGANIZED HEALTH CARE EDUCATION/TRAINING PROGRAM

## 2019-02-03 RX ORDER — AMOXICILLIN AND CLAVULANATE POTASSIUM 875; 125 MG/1; MG/1
1 TABLET, FILM COATED ORAL 2 TIMES DAILY
Qty: 18 TAB | Refills: 0 | Status: ON HOLD | OUTPATIENT
Start: 2019-02-03 | End: 2019-02-05

## 2019-02-03 RX ADMIN — AMPICILLIN SODIUM AND SULBACTAM SODIUM 3 G: 2; 1 INJECTION, POWDER, FOR SOLUTION INTRAMUSCULAR; INTRAVENOUS at 08:20

## 2019-02-03 RX ADMIN — HEPARIN SODIUM 5000 UNITS: 5000 INJECTION, SOLUTION INTRAVENOUS; SUBCUTANEOUS at 04:39

## 2019-02-03 RX ADMIN — SODIUM CHLORIDE: 9 INJECTION, SOLUTION INTRAVENOUS at 04:39

## 2019-02-03 RX ADMIN — AMPICILLIN SODIUM AND SULBACTAM SODIUM 3 G: 2; 1 INJECTION, POWDER, FOR SOLUTION INTRAMUSCULAR; INTRAVENOUS at 11:48

## 2019-02-03 NOTE — DISCHARGE INSTRUCTIONS
Discharge Instructions    Discharged to other by car with relative. Discharged via wheelchair, hospital escort: Yes.  Special equipment needed: Not Applicable    Be sure to schedule a follow-up appointment with your primary care doctor or any specialists as instructed.     Discharge Plan:   Diet Plan: Discussed (heart healthy)  Activity Level: Discussed (as tolerated)  Confirmed Follow up Appointment: Appointment Scheduled  Confirmed Symptoms Management: Discussed  Medication Reconciliation Updated: Yes  Influenza Vaccine Indication: Not indicated: Previously immunized this influenza season and > 8 years of age    I understand that a diet low in cholesterol, fat, and sodium is recommended for good health. Unless I have been given specific instructions below for another diet, I accept this instruction as my diet prescription.   Other diet: heart healthy    Special Instructions: None    · Is patient discharged on Warfarin / Coumadin?   No     Depression / Suicide Risk    As you are discharged from this [facility] facility, it is important to learn how to keep safe from harming yourself.    Recognize the warning signs:  · Abrupt changes in personality, positive or negative- including increase in energy   · Giving away possessions  · Change in eating patterns- significant weight changes-  positive or negative  · Change in sleeping patterns- unable to sleep or sleeping all the time   · Unwillingness or inability to communicate  · Depression  · Unusual sadness, discouragement and loneliness  · Talk of wanting to die  · Neglect of personal appearance   · Rebelliousness- reckless behavior  · Withdrawal from people/activities they love  · Confusion- inability to concentrate     If you or a loved one observes any of these behaviors or has concerns about self-harm, here's what you can do:  · Talk about it- your feelings and reasons for harming yourself  · Remove any means that you might use to hurt yourself (examples:  pills, rope, extension cords, firearm)  · Get professional help from the community (Mental Health, Substance Abuse, psychological counseling)  · Do not be alone:Call your Safe Contact- someone whom you trust who will be there for you.  · Call your local CRISIS HOTLINE 567-0743 or 109-438-2505  · Call your local Children's Mobile Crisis Response Team [place] (563) 657-2687 or www.Networked Organisms  · Call the toll free National Suicide Prevention Hotlines   · National Suicide Prevention Lifeline 830-999-GSUJ (8978)  · [place] [facility] Line Network 800-SUICIDE (609-9876)        Antibiotic Medicine  Introduction  Antibiotic medicines are used to treat infections caused by bacteria. They work by hurting or killing the germs that are making you sick.  How will my medicine be picked?  There are many kinds of antibiotic medicines. To help your doctor pick one, tell your doctor if:  · You have any allergies.  · You are pregnant or plan to get pregnant.  · You are breastfeeding.  · You are taking any medicines. These include over-the-counter medicines, prescription medicines, and herbal remedies.  · You have a medical condition or problem.  If you have questions about why your medicine was picked, ask.  For how long should I take my medicine?  Take your medicine for as long as your doctor tells you to. Do not stop taking it when you feel better. If you stop taking it too soon:  · You may start to feel sick again.  · Your infection may get harder to treat.  · New problems may develop.  What if I miss a dose?  Try not to miss any doses of antibiotic medicine. If you miss a dose:  · Take the dose as soon as you can.  · If you are taking 2 doses a day, take the next dose in 5 to 6 hours.  · If you are taking 3 or more doses a day, take the next dose in 2 to 4 hours. Then go back to the normal schedule.  If you cannot take a missed dose, take the next dose on time. Then take the missed dose after you have taken all the doses as  told by your doctor, as if you had one more dose left.  Does this medicine affect birth control?  Birth control pills may not work while you are on antibiotic medicines. If you are taking birth control pills, keep taking them as usual. Use a second form of birth control, such as a condom. Keep using the second form of birth control until you are finished with your current 1 month cycle of birth control pills.  Get help if:  · You get worse.  · You do not feel better a few days after starting the medicine.  · You throw up (vomit).  · There are white patches in your mouth.  · You have new joint pain after starting the medicine.  · You have new muscle aches after starting the medicine.  · You had a fever before starting the medicine, and it comes back.  · You have any symptoms of an allergic reaction, such as an itchy rash. If this happens, stop taking the medicine.  Get help right away if:  · Your pee (urine) turns dark or becomes blood-colored.  · Your skin turns yellow.  · You bruise or bleed easily.  · You have very bad watery poop (diarrhea) and cramps in your belly (abdomen).  · You have a very bad headache.  · You have signs of a very bad allergic reaction, such as:  ¨ Trouble breathing.  ¨ Wheezing.  ¨ Swelling of the lips, tongue, or face.  ¨ Fainting.  ¨ Blisters on the skin or in the mouth.  If you have signs of a very bad allergic reaction, stop taking the antibiotic medicine right away.  This information is not intended to replace advice given to you by your health care provider. Make sure you discuss any questions you have with your health care provider.  Document Released: 09/26/2009 Document Revised: 08/15/2017 Document Reviewed: 05/04/2016  © 2017 [employer]        Bronchitis  Bronchitis is a problem of the air tubes leading to your lungs. This problem makes it hard for air to get in and out of the lungs. You may cough a lot because your air tubes are narrow. Going without care can cause lasting  (chronic) bronchitis.  HOME CARE   · Drink enough fluids to keep your pee (urine) clear or pale yellow.  · Use a cool mist humidifier.  · Quit smoking if you smoke. If you keep smoking, the bronchitis might not get better.  · Only take medicine as told by your doctor.  GET HELP RIGHT AWAY IF:   · Coughing keeps you awake.  · You start to wheeze.  · You become more sick or weak.  · You have a hard time breathing or get short of breath.  · You cough up blood.  · Coughing lasts more than 2 weeks.  · You have a fever.  · Your baby is older than 3 months with a rectal temperature of 102° F (38.9° C) or higher.  · Your baby is 3 months old or younger with a rectal temperature of 100.4° F (38° C) or higher.  MAKE SURE YOU:  · Understand these instructions.  · Will watch your condition.  · Will get help right away if you are not doing well or get worse.  Document Released: 06/05/2009 Document Revised: 03/11/2013 Document Reviewed: 11/19/2010  [employer]® Patient Information ©2014 [employer].

## 2019-02-03 NOTE — DISCHARGE SUMMARY
"DISCHARGE SUMMARY     ADMIT DATE: 2019       DISCHARGE DATE: 2/3/2019    SERVICE: [facility]  ATTENDING PHYSICIAN: Dr. Sabiha Leary  SENIOR RESIDENT: Dr. Saravanan Alejo  MARTHA RESIDENT: Dr. Manny Correa      24 hour events:  Patient says that she is feeling better than she was yesterday, however overnight had some continued coughing.  She is currently in room air oxygen and maintaining saturations above 97%    Objective:  Vitals:  Temp (24hrs), Av.7 °C (98 °F), Min:36.3 °C (97.4 °F), Max:37.1 °C (98.7 °F)      Blood pressure 135/59, pulse 66, temperature 36.3 °C (97.4 °F), temperature source Oral, resp. rate 20, height 1.651 m (5' 5\"), weight 56.2 kg (123 lb 14.4 oz), SpO2 91 %, not currently breastfeeding.   Oxygen: Pulse Oximetry: 91 %, O2 (LPM): 1, O2 Delivery: Nasal Cannula    In/Out:  I/O last 3 completed shifts:  In: 100 [P.O.:100]  Out: -     Physical Exam  Gen:  NAD  HEENT: MMM, EOMI  Cardio: RRR, clear s1/s2, no murmur  Resp:  Crackles R>L, wheezing bilateral  GI/: Soft, non-distended, no TTP, normal bowel sounds, no guarding/rebound  Neuro: Non-focal, Gross intact, no deficits  Skin/Extremities: Cap refill <3sec, warm/well perfused, no rash, normal extremities          FINAL DIAGNOSES:   Hypoxia 2/2 bronchitis    HOSPITAL COURSE:   Patient complains of a one-month history of nonproductive cough and fatigue.  Had been previously tried on unknown antibiotic, likely azithromycin.  On presentation to the ED patient was found to be Flu negative.  Chest x-ray showing slightly increased interstitial opacity, cannot rule out pneumonia versus interstitial edema.  Was initiated on Unasyn and azithromycin in the emergency department.  Patient was given a 1 L fluid bolus.  She was started on 2 L nasal cannula at presentation to the ED and was satting in the mid 90s on 2L O2 upon examination.  Patient was weaned to 1 L O2 the night of , and the morning of 2/3 was satting in the mid 90s " in room air oxygen.  Patient said she was feeling much better, that her cough had improved, and was able to ambulate to and from the bathroom at her baseline ability.  She was subsequently found to be stable for discharge home, with 9-day course of p.o. Augmentin and recommended follow-up with primary care physician, Dr. Solis, in 1 week.    CONSULTANTS:   None    PROCEDURES:   None    IMAGING/ LABS:     DX-CHEST-PORTABLE (1 VIEW)   Final Result      There is slightly increased increased interstitial opacity which may represent interstitial edema or pneumonia. No lobar consolidation is present.            Recent Labs      02/02/19   0821 02/03/19   0055   WBC  6.8  7.0   RBC  3.86*  3.70*   HEMOGLOBIN  11.6*  11.1*   HEMATOCRIT  37.3  36.5*   MCV  96.6  98.6*   MCH  30.1  30.0   RDW  49.5  50.7*   PLATELETCT  249  220   MPV  10.7  10.5   NEUTSPOLYS  75.70*  78.20*   LYMPHOCYTES  12.10*  10.00*   MONOCYTES  8.40  9.00   EOSINOPHILS  2.60  1.90   BASOPHILS  0.90  0.60     No results found for: [id number], FOLATE, FERRITIN, IRON, TOTIRONBC    Estimated GFR/CRCL = Estimated Creatinine Clearance: 40.3 mL/min (by C-G formula based on SCr of 0.79 mg/dL).  Recent Labs      02/02/19 0821 02/03/19 0055   SODIUM  141  142   POTASSIUM  3.7  3.4*   CHLORIDE  110  111   CO2  21  22   BUN  23*  16   CREATININE  0.86  0.79   CALCIUM  8.8  8.2*   ALBUMIN  3.7   --        Recent Labs      02/02/19 0821   ALTSGPT  9   ASTSGOT  18   ALKPHOSPHAT  111*   TBILIRUBIN  0.4   ALBUMIN  3.7   GLOBULIN  3.2       No results for input(s): POCGLUCOSE in the last 72 hours.  No results found for: HBA1C, TSH, FREET4, FREET3, CORTISOL    DISCHARGE MEDICATIONS:     (x)  Medication Reconciliation Completed     Medication List      START taking these medications      Instructions   amoxicillin-clavulanate 875-125 MG Tabs  Commonly known as:  AUGMENTIN   Take 1 Tab by mouth 2 times a day.  Dose:  1 Tab        CONTINUE taking these  medications      Instructions   acetaminophen 500 MG Tabs  Commonly known as:  TYLENOL   Take 500 mg by mouth every 6 hours as needed for Moderate Pain.  Dose:  500 mg     lovastatin 20 MG Tabs  Commonly known as:  MEVACOR   Take 20 mg by mouth every day.  Dose:  20 mg            DISPOSITION: Discharge home in stable condition    DIET: Regular    ACTIVITY: As tolerated         INSTRUCTIONS:      The patient was instructed to return to the ER in the event of worsening symptoms. I have counseled the patient on the importance of compliance and the patient has agreed to proceed with all medical recommendations and follow up plan indicated above.   The patient understands that all medications come with benefits and risks. Risks may include permanent injury or death and these risks can be minimized with close reassessment and monitoring.        PCP:   Umberto Solis M.D.  Discharge summary faxed to primary care provider  Copy of discharge summary given to the patient    F/U APPOINTMENT:   Dr. Solis in 1 week

## 2019-02-03 NOTE — CARE PLAN
Problem: Communication  Goal: The ability to communicate needs accurately and effectively will improve  Outcome: PROGRESSING AS EXPECTED  Able to make needs known, uses call bell appropriately. A&O x 4.     Problem: Safety  Goal: Will remain free from injury  Outcome: PROGRESSING AS EXPECTED  Resting in bed at this time, call bell in reach. Fall precautions in place. Bed in lowest position.

## 2019-02-03 NOTE — PROGRESS NOTES
"Pt alert and oriented x4. Pt verbalizes \" I want to sleep\". Provided calm and quiet environment. Safety precautions in placed. Bed in lowest position. Upper side rails up. Treaded socks on. Reinforced the use of call light when needing assistance.  "

## 2019-02-03 NOTE — PROGRESS NOTES
89 yo female arrived on the floor in stable condition via cart with transport and all belongings. A&O X4, able to make needs known. No C/O at this time. Expiratory wheezes noted. Nonproductive cough. 02 97% 2L NC.  2 Rn skin Check performed with Priyanka MICHAEL, no skin issues noted. Resting in bed at this time. Call bell in reach. Bed in lowest position. Fall precautions in lace, bed alarm on. Pt verbalized understanding of calling for assistance.

## 2019-02-04 ENCOUNTER — HOSPITAL ENCOUNTER (INPATIENT)
Facility: MEDICAL CENTER | Age: 84
LOS: 1 days | DRG: 194 | End: 2019-02-05
Attending: EMERGENCY MEDICINE | Admitting: FAMILY MEDICINE
Payer: MEDICARE

## 2019-02-04 ENCOUNTER — APPOINTMENT (OUTPATIENT)
Dept: RADIOLOGY | Facility: MEDICAL CENTER | Age: 84
DRG: 194 | End: 2019-02-04
Attending: EMERGENCY MEDICINE
Payer: MEDICARE

## 2019-02-04 DIAGNOSIS — R11.10 VOMITING AND DIARRHEA: ICD-10-CM

## 2019-02-04 DIAGNOSIS — E86.0 DEHYDRATION: ICD-10-CM

## 2019-02-04 DIAGNOSIS — R19.7 VOMITING AND DIARRHEA: ICD-10-CM

## 2019-02-04 DIAGNOSIS — J18.9 COMMUNITY ACQUIRED PNEUMONIA, UNSPECIFIED LATERALITY: ICD-10-CM

## 2019-02-04 DIAGNOSIS — N17.9 AKI (ACUTE KIDNEY INJURY) (HCC): ICD-10-CM

## 2019-02-04 LAB
ALBUMIN SERPL BCP-MCNC: 3.8 G/DL (ref 3.2–4.9)
ALBUMIN/GLOB SERPL: 0.9 G/DL
ALP SERPL-CCNC: 125 U/L (ref 30–99)
ALT SERPL-CCNC: 8 U/L (ref 2–50)
ANION GAP SERPL CALC-SCNC: 16 MMOL/L (ref 0–11.9)
APPEARANCE UR: CLEAR
AST SERPL-CCNC: 22 U/L (ref 12–45)
BACTERIA #/AREA URNS HPF: NEGATIVE /HPF
BASOPHILS # BLD AUTO: 0.1 % (ref 0–1.8)
BASOPHILS # BLD: 0.01 K/UL (ref 0–0.12)
BILIRUB SERPL-MCNC: 0.5 MG/DL (ref 0.1–1.5)
BILIRUB UR QL STRIP.AUTO: NEGATIVE
BNP SERPL-MCNC: 193 PG/ML (ref 0–100)
BUN SERPL-MCNC: 26 MG/DL (ref 8–22)
CALCIUM SERPL-MCNC: 9.3 MG/DL (ref 8.5–10.5)
CHLORIDE SERPL-SCNC: 106 MMOL/L (ref 96–112)
CO2 SERPL-SCNC: 18 MMOL/L (ref 20–33)
COLOR UR: YELLOW
CREAT SERPL-MCNC: 1.26 MG/DL (ref 0.5–1.4)
EOSINOPHIL # BLD AUTO: 0.01 K/UL (ref 0–0.51)
EOSINOPHIL NFR BLD: 0.1 % (ref 0–6.9)
EPI CELLS #/AREA URNS HPF: NEGATIVE /HPF
ERYTHROCYTE [DISTWIDTH] IN BLOOD BY AUTOMATED COUNT: 49.6 FL (ref 35.9–50)
FLUAV RNA SPEC QL NAA+PROBE: NEGATIVE
FLUBV RNA SPEC QL NAA+PROBE: NEGATIVE
GLOBULIN SER CALC-MCNC: 4.1 G/DL (ref 1.9–3.5)
GLUCOSE SERPL-MCNC: 139 MG/DL (ref 65–99)
GLUCOSE UR STRIP.AUTO-MCNC: NEGATIVE MG/DL
HCT VFR BLD AUTO: 43.1 % (ref 37–47)
HGB BLD-MCNC: 13.4 G/DL (ref 12–16)
HYALINE CASTS #/AREA URNS LPF: ABNORMAL /LPF
IMM GRANULOCYTES # BLD AUTO: 0.02 K/UL (ref 0–0.11)
IMM GRANULOCYTES NFR BLD AUTO: 0.3 % (ref 0–0.9)
KETONES UR STRIP.AUTO-MCNC: 15 MG/DL
LEUKOCYTE ESTERASE UR QL STRIP.AUTO: NEGATIVE
LIPASE SERPL-CCNC: 35 U/L (ref 11–82)
LYMPHOCYTES # BLD AUTO: 0.32 K/UL (ref 1–4.8)
LYMPHOCYTES NFR BLD: 4.7 % (ref 22–41)
MCH RBC QN AUTO: 30.2 PG (ref 27–33)
MCHC RBC AUTO-ENTMCNC: 31.1 G/DL (ref 33.6–35)
MCV RBC AUTO: 97.3 FL (ref 81.4–97.8)
MICRO URNS: ABNORMAL
MONOCYTES # BLD AUTO: 0.51 K/UL (ref 0–0.85)
MONOCYTES NFR BLD AUTO: 7.5 % (ref 0–13.4)
NEUTROPHILS # BLD AUTO: 5.95 K/UL (ref 2–7.15)
NEUTROPHILS NFR BLD: 87.3 % (ref 44–72)
NITRITE UR QL STRIP.AUTO: NEGATIVE
NRBC # BLD AUTO: 0 K/UL
NRBC BLD-RTO: 0 /100 WBC
PH UR STRIP.AUTO: 5 [PH]
PLATELET # BLD AUTO: 268 K/UL (ref 164–446)
PMV BLD AUTO: 11.1 FL (ref 9–12.9)
POTASSIUM SERPL-SCNC: 3.6 MMOL/L (ref 3.6–5.5)
PROT SERPL-MCNC: 7.9 G/DL (ref 6–8.2)
PROT UR QL STRIP: NEGATIVE MG/DL
RBC # BLD AUTO: 4.43 M/UL (ref 4.2–5.4)
RBC # URNS HPF: ABNORMAL /HPF
RBC UR QL AUTO: ABNORMAL
SODIUM SERPL-SCNC: 140 MMOL/L (ref 135–145)
SP GR UR STRIP.AUTO: 1.04
TROPONIN I SERPL-MCNC: 0.01 NG/ML (ref 0–0.04)
UROBILINOGEN UR STRIP.AUTO-MCNC: 0.2 MG/DL
WBC # BLD AUTO: 6.8 K/UL (ref 4.8–10.8)
WBC #/AREA URNS HPF: ABNORMAL /HPF

## 2019-02-04 PROCEDURE — 74177 CT ABD & PELVIS W/CONTRAST: CPT

## 2019-02-04 PROCEDURE — 700111 HCHG RX REV CODE 636 W/ 250 OVERRIDE (IP): Performed by: EMERGENCY MEDICINE

## 2019-02-04 PROCEDURE — 83880 ASSAY OF NATRIURETIC PEPTIDE: CPT

## 2019-02-04 PROCEDURE — 36415 COLL VENOUS BLD VENIPUNCTURE: CPT

## 2019-02-04 PROCEDURE — 700111 HCHG RX REV CODE 636 W/ 250 OVERRIDE (IP): Performed by: STUDENT IN AN ORGANIZED HEALTH CARE EDUCATION/TRAINING PROGRAM

## 2019-02-04 PROCEDURE — 700111 HCHG RX REV CODE 636 W/ 250 OVERRIDE (IP)

## 2019-02-04 PROCEDURE — 96368 THER/DIAG CONCURRENT INF: CPT

## 2019-02-04 PROCEDURE — 700117 HCHG RX CONTRAST REV CODE 255: Performed by: EMERGENCY MEDICINE

## 2019-02-04 PROCEDURE — 80053 COMPREHEN METABOLIC PANEL: CPT

## 2019-02-04 PROCEDURE — 700105 HCHG RX REV CODE 258: Performed by: EMERGENCY MEDICINE

## 2019-02-04 PROCEDURE — 87502 INFLUENZA DNA AMP PROBE: CPT

## 2019-02-04 PROCEDURE — 96365 THER/PROPH/DIAG IV INF INIT: CPT | Mod: XU

## 2019-02-04 PROCEDURE — A9270 NON-COVERED ITEM OR SERVICE: HCPCS | Performed by: STUDENT IN AN ORGANIZED HEALTH CARE EDUCATION/TRAINING PROGRAM

## 2019-02-04 PROCEDURE — 83690 ASSAY OF LIPASE: CPT

## 2019-02-04 PROCEDURE — 700105 HCHG RX REV CODE 258: Performed by: STUDENT IN AN ORGANIZED HEALTH CARE EDUCATION/TRAINING PROGRAM

## 2019-02-04 PROCEDURE — 81001 URINALYSIS AUTO W/SCOPE: CPT

## 2019-02-04 PROCEDURE — 700102 HCHG RX REV CODE 250 W/ 637 OVERRIDE(OP): Performed by: STUDENT IN AN ORGANIZED HEALTH CARE EDUCATION/TRAINING PROGRAM

## 2019-02-04 PROCEDURE — 84484 ASSAY OF TROPONIN QUANT: CPT

## 2019-02-04 PROCEDURE — 85025 COMPLETE CBC W/AUTO DIFF WBC: CPT

## 2019-02-04 PROCEDURE — 93005 ELECTROCARDIOGRAM TRACING: CPT | Performed by: EMERGENCY MEDICINE

## 2019-02-04 PROCEDURE — 770006 HCHG ROOM/CARE - MED/SURG/GYN SEMI*

## 2019-02-04 PROCEDURE — 71275 CT ANGIOGRAPHY CHEST: CPT

## 2019-02-04 RX ORDER — AZITHROMYCIN 500 MG/1
500 INJECTION, POWDER, LYOPHILIZED, FOR SOLUTION INTRAVENOUS ONCE
Status: COMPLETED | OUTPATIENT
Start: 2019-02-04 | End: 2019-02-04

## 2019-02-04 RX ORDER — HYDROXYZINE 50 MG/1
50 TABLET, FILM COATED ORAL 3 TIMES DAILY PRN
Status: DISCONTINUED | OUTPATIENT
Start: 2019-02-04 | End: 2019-02-05 | Stop reason: HOSPADM

## 2019-02-04 RX ORDER — ONDANSETRON 4 MG/1
4 TABLET, ORALLY DISINTEGRATING ORAL ONCE
Status: COMPLETED | OUTPATIENT
Start: 2019-02-04 | End: 2019-02-04

## 2019-02-04 RX ORDER — ACETAMINOPHEN 325 MG/1
650 TABLET ORAL ONCE
Status: ACTIVE | OUTPATIENT
Start: 2019-02-04 | End: 2019-02-05

## 2019-02-04 RX ORDER — LOVASTATIN 20 MG/1
20 TABLET ORAL DAILY
Status: DISCONTINUED | OUTPATIENT
Start: 2019-02-04 | End: 2019-02-05 | Stop reason: HOSPADM

## 2019-02-04 RX ORDER — AMOXICILLIN 250 MG
2 CAPSULE ORAL 2 TIMES DAILY
Status: DISCONTINUED | OUTPATIENT
Start: 2019-02-04 | End: 2019-02-04

## 2019-02-04 RX ORDER — ONDANSETRON 4 MG/1
TABLET, ORALLY DISINTEGRATING ORAL
Status: COMPLETED
Start: 2019-02-04 | End: 2019-02-04

## 2019-02-04 RX ORDER — HEPARIN SODIUM 5000 [USP'U]/ML
5000 INJECTION, SOLUTION INTRAVENOUS; SUBCUTANEOUS EVERY 8 HOURS
Status: DISCONTINUED | OUTPATIENT
Start: 2019-02-04 | End: 2019-02-05 | Stop reason: HOSPADM

## 2019-02-04 RX ORDER — SODIUM CHLORIDE 9 MG/ML
1000 INJECTION, SOLUTION INTRAVENOUS ONCE
Status: COMPLETED | OUTPATIENT
Start: 2019-02-04 | End: 2019-02-04

## 2019-02-04 RX ORDER — POLYETHYLENE GLYCOL 3350 17 G/17G
1 POWDER, FOR SOLUTION ORAL
Status: DISCONTINUED | OUTPATIENT
Start: 2019-02-04 | End: 2019-02-04

## 2019-02-04 RX ORDER — BISACODYL 10 MG
10 SUPPOSITORY, RECTAL RECTAL
Status: DISCONTINUED | OUTPATIENT
Start: 2019-02-04 | End: 2019-02-04

## 2019-02-04 RX ORDER — SODIUM CHLORIDE 9 MG/ML
INJECTION, SOLUTION INTRAVENOUS CONTINUOUS
Status: DISCONTINUED | OUTPATIENT
Start: 2019-02-04 | End: 2019-02-05 | Stop reason: HOSPADM

## 2019-02-04 RX ADMIN — AZITHROMYCIN MONOHYDRATE 500 MG: 500 INJECTION, POWDER, LYOPHILIZED, FOR SOLUTION INTRAVENOUS at 03:43

## 2019-02-04 RX ADMIN — ONDANSETRON 4 MG: 4 TABLET, ORALLY DISINTEGRATING ORAL at 00:30

## 2019-02-04 RX ADMIN — SODIUM CHLORIDE: 9 INJECTION, SOLUTION INTRAVENOUS at 09:10

## 2019-02-04 RX ADMIN — CEFTRIAXONE SODIUM 1 G: 1 INJECTION, POWDER, FOR SOLUTION INTRAMUSCULAR; INTRAVENOUS at 17:39

## 2019-02-04 RX ADMIN — SODIUM CHLORIDE 1000 ML: 9 INJECTION, SOLUTION INTRAVENOUS at 01:15

## 2019-02-04 RX ADMIN — LOVASTATIN 20 MG: 20 TABLET ORAL at 10:43

## 2019-02-04 RX ADMIN — CEFTRIAXONE SODIUM 1 G: 1 INJECTION, POWDER, FOR SOLUTION INTRAMUSCULAR; INTRAVENOUS at 03:43

## 2019-02-04 RX ADMIN — IOHEXOL 100 ML: 350 INJECTION, SOLUTION INTRAVENOUS at 02:15

## 2019-02-04 ASSESSMENT — COGNITIVE AND FUNCTIONAL STATUS - GENERAL
SUGGESTED CMS G CODE MODIFIER MOBILITY: CK
MOBILITY SCORE: 17
CLIMB 3 TO 5 STEPS WITH RAILING: A LITTLE
MOVING FROM LYING ON BACK TO SITTING ON SIDE OF FLAT BED: A LOT
WALKING IN HOSPITAL ROOM: A LITTLE
HELP NEEDED FOR BATHING: A LITTLE
TOILETING: A LITTLE
TURNING FROM BACK TO SIDE WHILE IN FLAT BAD: A LITTLE
DRESSING REGULAR UPPER BODY CLOTHING: A LITTLE
DRESSING REGULAR LOWER BODY CLOTHING: A LITTLE
MOVING TO AND FROM BED TO CHAIR: A LITTLE
SUGGESTED CMS G CODE MODIFIER DAILY ACTIVITY: CJ
STANDING UP FROM CHAIR USING ARMS: A LITTLE
DAILY ACTIVITIY SCORE: 20

## 2019-02-04 NOTE — PROGRESS NOTES
Pt transferred from ED via gurney accompanied by transporter and daughter on 4L O2 via NC.  Pt walked to bathroom with an episode of stress incontinence, and then to bed with one person assisstance.  Pt assessed, A&O x4, hearing aids in place and [place].  Pt has no complaints of pain at this time.  Updated pt on unit routine including call light system, hourly rounding, white board information, need for bed alarm, and visitors policy.  Bed in lowest position, locked, bed alarm active, pt wearing yellow treaded socks, and pt instructed on need to call for assistance prior to getting out of bed.  Call light, phone, and personal belongs within reach, white board updated.  Daughter, Elsy, left for home shortly after pt's admission with a request to be called and updated after MD rounds.  Pt agrees for care team to be able to share information with Elsy.

## 2019-02-04 NOTE — ED TRIAGE NOTES
Mila Scanlon  89 y.o.  Chief Complaint   Patient presents with   • Nausea/Vomiting/Diarrhea     x 2-3 hours     Patient to triage via wheelchair for above.    Patient was seen in this hospital (dischargd 2/3/19) for pneumonia. Sent home with prescription for PO Augmentin, taken around 1900.    Patient developed nausea with emesis x 1 episode of brown emesis, mostly dry heaving per patient. Diarrhea x 3 episodes.    + congested cough noted in triage. Patient place don oxygen 2L NC which is new - states that she has been oxygen-dependent at home since this new diagnosis of pneumonia.    Family called nurse hotline who recommended that patient present to the ED.    Charge ALEC Jiménez notified of patient.    Patient roomed immediately to [facility] via wheelchair.

## 2019-02-04 NOTE — PROGRESS NOTES
Discharging pt home/to prior assisted living per physician order.  Discharged with daughter.  Demonstrated understanding of discharge instructions, follow up appointments, home medications, prescriptions.  Ambulating with stand by to one person assistance, voiding without difficulty, pain well controlled, tolerating oral medications, oxygen saturation >90%, and tolerating diet.  Educational handouts on abx therapy given and discussed.  Verbalized understanding of discharge instructions and educational handouts.  All questions answered, belongings with patient at the time of discharge.

## 2019-02-04 NOTE — ED NOTES
Med rec complete per pt's daughter at bedside. Pt was started on augmentin yesterday and only had 1 dose.

## 2019-02-04 NOTE — ED PROVIDER NOTES
"ED Provider Note    Scribed for Dirk Gonzalez M.D. by Giuseppe Glover. 2/4/2019  12:36 AM    Means of arrival: Walk in  History obtained from: Family  History limited by: None    CHIEF COMPLAINT  Chief Complaint   Patient presents with   • Nausea/Vomiting/Diarrhea     x 2-3 hours       HPI    Mila Scanlon is a 89 y.o. female presenting with nausea, vomiting, and diarrhea onset earlier today. Family was prompted to bring patient to the ED tonight when she had one episode of emesis \"dark brown\" in quality at 11:30 PM. The patient currently endorses associated right upper quadrant pain. No alleviating or exacerbating factors were identified. Family reports no hematochezia or hematemesis. She is noted to have recently been seen in the ED 1.5 days ago for a cough. The patient was admitted for less than 24 hours, and discharged yesterday at 1:00 PM, with a course of Augmentin. Family also reports patient was given a course of antibiotics at the beginning of January for cold like symptoms. She is currently on supplemental oxygen, but family reports she does not typically require oxygen at baseline.    REVIEW OF SYSTEMS  See HPI for further details.   Pertinent positives include: nausea, vomiting, diarrhea, right upper quadrant pain, and cough   Pertinent negative include: hematemesis, hematochezia   10 + review of systems otherwise negative     PAST MEDICAL HISTORY   has a past medical history of Anxiety; Anxiety; Arthritis; Back pain; Carotid artery obstruction (2008); Cataract; High cholesterol; Hypertension; IBS (irritable bowel syndrome); Kidney stones (2008); Pneumonia; and Skin cancer (2013).    SOCIAL HISTORY  Social History     Social History Main Topics   • Smoking status: Former Smoker     Quit date: 10/1/2015   • Smokeless tobacco: Never Used   • Alcohol use No   • Drug use: No   • Sexual activity: Not Currently       SURGICAL HISTORY   has a past surgical history that includes hysterectomy " "laparoscopy.    CURRENT MEDICATIONS  Home Medications     Reviewed by Kennedi Don R.N. (Registered Nurse) on 02/04/19 at 0016  Med List Status: Complete   Medication Last Dose Status   acetaminophen (TYLENOL) 500 MG Tab  Active   amoxicillin-clavulanate (AUGMENTIN) 875-125 MG Tab  Active   lovastatin (MEVACOR) 20 MG Tab  Active                ALLERGIES  Allergies   Allergen Reactions   • Ciprofloxacin Nausea       PHYSICAL EXAM  VITAL SIGNS: /56   Pulse 85   Temp 36.4 °C (97.5 °F) (Temporal)   Resp 18   Ht 1.651 m (5' 5\")   Wt 56.2 kg (123 lb 14.4 oz)   SpO2 91%   BMI 20.62 kg/m²    SpO2: I interpret this pulse oximetry as normal  Constitutional: Well developed, Well nourished, no distress, Non-toxic appearance.   HENT: Normocephalic, Atraumatic, Bilateral external ears normal, Oropharynx moist, No oral exudates.   Eyes: PERRLA, EOMI, Conjunctiva normal, No discharge.   Neck: No tenderness, Supple, No stridor.   Lymphatic: No lymphadenopathy noted.   Cardiovascular: Normal heart rate, Normal rhythm, 2+ radial equal pulses  Thorax & Lungs: Clear to auscultation bilaterally, No respiratory distress, No wheezing, No crackles.   Abdomen: Soft, RUQ tenderness, No masses, No pulsatile masses.   Skin: Warm, Dry, No erythema, No rash.   Extremities:, No edema No cyanosis.   Musculoskeletal: No tenderness to palpation or major deformities noted.  Intact distal pulses  Neurologic: Awake, alert. Moves all extremities spontaneously.  Psychiatric: Affect normal, Judgment normal, Mood normal.     DIAGNOSTIC STUDIES / PROCEDURES    EKG  EKG (my interpretation): Normal sinus rhythm, rate 86, axis normal, no ST or T-wave abnormalities, NE interval of 240, first degree AV block, no prior   Relevant clinical issues: shortness of breath    LABORATORY  Results for orders placed or performed during the hospital encounter of 02/04/19   CBC WITH DIFFERENTIAL   Result Value Ref Range    WBC 6.8 4.8 - 10.8 K/uL    RBC 4.43 " 4.20 - 5.40 M/uL    Hemoglobin 13.4 12.0 - 16.0 g/dL    Hematocrit 43.1 37.0 - 47.0 %    MCV 97.3 81.4 - 97.8 fL    MCH 30.2 27.0 - 33.0 pg    MCHC 31.1 (L) 33.6 - 35.0 g/dL    RDW 49.6 35.9 - 50.0 fL    Platelet Count 268 164 - 446 K/uL    MPV 11.1 9.0 - 12.9 fL    Neutrophils-Polys 87.30 (H) 44.00 - 72.00 %    Lymphocytes 4.70 (L) 22.00 - 41.00 %    Monocytes 7.50 0.00 - 13.40 %    Eosinophils 0.10 0.00 - 6.90 %    Basophils 0.10 0.00 - 1.80 %    Immature Granulocytes 0.30 0.00 - 0.90 %    Nucleated RBC 0.00 /100 WBC    Neutrophils (Absolute) 5.95 2.00 - 7.15 K/uL    Lymphs (Absolute) 0.32 (L) 1.00 - 4.80 K/uL    Monos (Absolute) 0.51 0.00 - 0.85 K/uL    Eos (Absolute) 0.01 0.00 - 0.51 K/uL    Baso (Absolute) 0.01 0.00 - 0.12 K/uL    Immature Granulocytes (abs) 0.02 0.00 - 0.11 K/uL    NRBC (Absolute) 0.00 K/uL   LIPASE   Result Value Ref Range    Lipase 35 11 - 82 U/L   COMP METABOLIC PANEL   Result Value Ref Range    Sodium 140 135 - 145 mmol/L    Potassium 3.6 3.6 - 5.5 mmol/L    Chloride 106 96 - 112 mmol/L    Co2 18 (L) 20 - 33 mmol/L    Anion Gap 16.0 (H) 0.0 - 11.9    Glucose 139 (H) 65 - 99 mg/dL    Bun 26 (H) 8 - 22 mg/dL    Creatinine 1.26 0.50 - 1.40 mg/dL    Calcium 9.3 8.5 - 10.5 mg/dL    AST(SGOT) 22 12 - 45 U/L    ALT(SGPT) 8 2 - 50 U/L    Alkaline Phosphatase 125 (H) 30 - 99 U/L    Total Bilirubin 0.5 0.1 - 1.5 mg/dL    Albumin 3.8 3.2 - 4.9 g/dL    Total Protein 7.9 6.0 - 8.2 g/dL    Globulin 4.1 (H) 1.9 - 3.5 g/dL    A-G Ratio 0.9 g/dL   TROPONIN   Result Value Ref Range    Troponin I 0.01 0.00 - 0.04 ng/mL   BTYPE NATRIURETIC PEPTIDE   Result Value Ref Range    B Natriuretic Peptide 193 (H) 0 - 100 pg/mL   Influenza A/B By PCR   Result Value Ref Range    Influenza virus A RNA Negative Negative    Influenza virus B, PCR Negative Negative   ESTIMATED GFR   Result Value Ref Range    GFR If  48 (A) >60 mL/min/1.73 m 2    GFR If Non African American 40 (A) >60 mL/min/1.73 m 2    URINALYSIS CULTURE, IF INDICATED   Result Value Ref Range    Color Yellow     Character Clear     Specific Gravity 1.044 <1.035    Ph 5.0 5.0 - 8.0    Glucose Negative Negative mg/dL    Ketones 15 (A) Negative mg/dL    Protein Negative Negative mg/dL    Bilirubin Negative Negative    Urobilinogen, Urine 0.2 Negative    Nitrite Negative Negative    Leukocyte Esterase Negative Negative    Occult Blood Small (A) Negative    Micro Urine Req Microscopic    URINE MICROSCOPIC (W/UA)   Result Value Ref Range    WBC 0-2 /hpf    RBC 2-5 (A) /hpf    Bacteria Negative None /hpf    Epithelial Cells Negative /hpf    Hyaline Cast 0-2 /lpf   EKG (NOW)   Result Value Ref Range    Report       [facility] Emergency Dept.    Test Date:  2019  Pt Name:    JUAN BARROS                   Department: ER  MRN:        [id number]                      Room:        02  Gender:     Female                       Technician: [id number]  :        [id number]                   Requested By:ERIC MARQUEZ  Order #:    [id number]                    Reading MD:    Measurements  Intervals                                Axis  Rate:       86                           P:          78  NV:         240                          QRS:        -20  QRSD:       94                           T:          75  QT:         400  QTc:        479    Interpretive Statements  SINUS RHYTHM  FIRST DEGREE AV BLOCK  BORDERLINE LEFT AXIS DEVIATION  BORDERLINE REPOLARIZATION ABNORMALITY  Compared to ECG 2016 12:45:15  First degree AV block now present  Atrial fibrillation no longer present           RADIOLOGY    CT-CTA CHEST PULMONARY ARTERY W/ RECONS   Final Result         1.  No large central pulmonary embolus is appreciated, evaluation of the subsegmental branches is essentially nondiagnostic due to motion artifacts. Additional imaging would be required for definitive exclusion of small distal pulmonary emboli.   2.  Atherosclerosis and  atherosclerotic coronary artery disease.   3.  Hepatomegaly   4.  Bilateral pulmonary nodules measuring up to 1.9 cm, see nodule follow-up recommendations below.      Low Risk: CT at 3-6 months, then consider CT at 18-24 months      High Risk: CT at 3-6 months, then at 18-24 months      Comments: Use most suspicious nodule as guide to management. Follow-up intervals may vary according to size and risk.      Low Risk - Minimal or absent history of smoking and of other known risk factors.      High Risk - History of smoking or of other known risk factors.      Note: These recommendations do not apply to lung cancer screening, patients with immunosuppression, or patients with known primary cancer.      Fleischner Society 2017 Guidelines for Management of Incidentally Detected Pulmonary Nodules in Adults         CT-ABDOMEN-PELVIS WITH   Final Result         1.  Hazy interstitial bilateral lung base opacities, new since prior study, likely infiltrates.   2.  Fluid-filled prominence of small bowel with reactive mucosal pattern, could represent ileus and/or enteritis. Evolving obstructive changes not excluded. Recommend radiographic follow-up to resolution.   3.  Bilateral nephrolithiasis without visualized obstructive change.   4.  Atrophic pancreas and mild prominence of the pancreatic duct, similar to prior study   5.  Hepatomegaly   6.  Atherosclerosis                CHART REVIEW  Pertinent medical chart information was reviewed and reveals: patient was discharged yesterday with likely pneumonia.    COURSE & MEDICAL DECISION MAKING  Pertinent Labs & Imaging studies reviewed. (See chart for details)    Differential diagnoses include but not limited to: Viral syndrome, influenza, gastroenteritis, pneumonia, sepsis, UTI, pyelonephritis, pulmonary embolism, ACS    12:36 AM - Patient seen and examined at bedside. Discussed plan of care, including lab and imaging. Patient agrees to the plan of care. The patient will be  resuscitated with 1L NS IV and medicated with Tylenol 550 mg, Zofran 4mg. Ordered for CT-Abdomen/pelvis, CT-CTA chest pulmonary artery, DX-Chest-2 View, Influenza A/B, CBC w/ Differential, Lipase, CMP, Troponin, BNP, and EKG to evaluate her symptoms.     4:06 AM - Consult with [facility] who agrees to admit the patient.    Vitals:    02/04/19 0400 02/04/19 0431 02/04/19 0530 02/04/19 0630   BP:       Pulse: 84  91 74   Resp: 16 (!) 33 (!) 23 20   Temp:       TempSrc:       SpO2: 97%  98% 98%   Weight:       Height:           Medications   acetaminophen (TYLENOL) tablet 650 mg (650 mg Oral Refused 2/4/19 0115)   cefTRIAXone (ROCEPHIN) 1 g in  mL IVPB (not administered)   lovastatin (MEVACOR) tablet 20 mg (not administered)   ondansetron (ZOFRAN ODT) dispertab 4 mg (4 mg Oral Given 2/4/19 0030)   NS infusion 1,000 mL (0 mL Intravenous Stopped 2/4/19 0528)   iohexol (OMNIPAQUE) 350 mg/mL (100 mL Intravenous Given 2/4/19 0215)   cefTRIAXone (ROCEPHIN) 1 g in  mL IVPB (0 g Intravenous Stopped 2/4/19 0413)   azithromycin (ZITHROMAX) injection 500 mg (500 mg Intravenous Given 2/4/19 0343)       HYDRATION: Based on the patient's presentation of Acute Vomiting the patient was given IV fluids. IV Hydration was used because oral hydration was not adequate alone. Upon recheck following hydration, the patient was Improved.    89-year-old female presenting for vomiting, diarrhea.  Was recently admitted in discharged for what was presumed to be bronchitis, started on antibiotics.  Was hypoxic during admission and still requiring supplemental oxygen, has not used oxygen in the past.  On initial evaluation, vital signs and exam are reassuring, patient appears slightly dehydrated, slight tachycardia, requiring supplemental oxygen, however is stable.  Given empiric fluids for dehydration, tachycardia improved and patient feeling better.  Laboratory work shows acute kidney injury.  Urinalysis negative for infection  or significant blood concerning for stone.  No evidence of cholecystitis or pancreatitis on exam or by labs.  EKG is reassuring without evidence of ischemia and troponin negative.  Did consider pulmonary embolism, CT ordered as it will also better characterize occult pneumonia.  Pulmonary embolism, infiltrates concerning for pneumonia.  Started on empiric IV antibiotics.  No evidence of severe sepsis at this time.  Patient feeling very unwell, not tolerating p.o. at home, is dehydrated with signs of pneumonia and acute kidney injury, consulted [facility] for admission.  Patient hemodynamically stable at time of admission.    DISPOSITION  Patient will be admitted to [facility] in guarded condition.      FINAL IMPRESSION  Visit Diagnoses     ICD-10-CM   1. Dehydration E86.0   2. Vomiting and diarrhea R11.10    R19.7   3. Community acquired pneumonia, unspecified laterality J18.9   4. GENE (acute kidney injury) (HCC) N17.9        Giuseppe WALKER (Scribe), am scribing for, and in the presence of, Dirk Gonzalez M.D..    Electronically signed by: Giuseppe Glover (Scribe), 2/4/2019    Dirk WALKER M.D. personally performed the services described in this documentation, as scribed by Giuseppe Glover in my presence, and it is both accurate and complete. C    The note accurately reflects work and decisions made by me.  Dirk Gonzalez  2/4/2019  6:58 AM

## 2019-02-04 NOTE — H&P
FAMILY MEDICINE HISTORY AND PHYSICAL       PATIENT ID:  NAME:  Mila Scanlon  MRN:               [id number]  YOB: 1926    Date of Admission: 2/4/2019      Attending: Sapphire SON   Senior Resident: Pablo Jamison M.D. (PGY-2)    Primary Care Physician:  Umberto Solis M.D.    CC: Diarrhea    HPI: Mila Scanlon is a 89 y.o. who was recently admitted for possible community-acquired pneumonia, the patient was discharged yesterday on oral Augmentin.  Patient is accompanied by her daughter in the emergency room today.  The patient and family report that she took 1 dose of the Augmentin and sometime later began having profuse diarrhea as well as nausea and one episode of emesis while waiting in the emergency room.  Patient reports that other than the diarrhea and nausea she has had symptoms since going home from the hospital yesterday.  Patient has not had any blood or mucus in her diarrhea.  Patient reports mild dull lower abdominal pain.  No chest pain, no shortness of breath, no recent sick contacts.    ED Course: Patient was found to be dehydrated with a nontraumatic kidney injury, was fluid resuscitated and given antibiotics for coverage of community-acquired pneumonia.    REVIEW OF SYSTEMS:   Constitutional: no recent unintended weight loss   HEENT: No HA, No changes in vision, no recent loss of hearing   CVS: No Chest pain, no orthopnea   Pulm: No SOB, No dyspnea   GI:  no blood in stool   : No dysuria, no urgency/frequence, no hematuria   MSK: no myalgia, no joint swelling or pain   Skin: No recent rashes                PAST MEDICAL HISTORY:  Past Medical History:   Diagnosis Date   • Anxiety    • Anxiety    • Arthritis    • Back pain    • Carotid artery obstruction 2008   • Cataract    • High cholesterol    • Hypertension    • IBS (irritable bowel syndrome)    • Kidney stones 2008   • Pneumonia    • Skin cancer 2013       PAST SURGICAL HISTORY:  Past Surgical History:   Procedure Laterality  Date   • HYSTERECTOMY LAPAROSCOPY         FAMILY HISTORY:  Family History   Problem Relation Age of Onset   • Heart Disease Mother    • Heart Disease Sister        SOCIAL HISTORY:   Social History     Social History   • Marital status:      Spouse name: N/A   • Number of children: N/A   • Years of education: N/A     Occupational History   • Not on file.     Social History Main Topics   • Smoking status: Former Smoker     Quit date: 10/1/2015   • Smokeless tobacco: Never Used   • Alcohol use No   • Drug use: No   • Sexual activity: Not Currently     Other Topics Concern   • Not on file     Social History Narrative   • No narrative on file   Patient lives in senior assisted living facility in [place].    DIET:   No orders of the defined types were placed in this encounter.      ALLERGIES:  Allergies   Allergen Reactions   • Ciprofloxacin Nausea       OUTPATIENT MEDICATIONS:    Current Facility-Administered Medications:   •  acetaminophen (TYLENOL) tablet 650 mg, 650 mg, Oral, Once, Dirk Gonzalez M.D.    Current Outpatient Prescriptions:   •  amoxicillin-clavulanate (AUGMENTIN) 875-125 MG Tab, Take 1 Tab by mouth 2 times a day., Disp: 18 Tab, Rfl: 0  •  lovastatin (MEVACOR) 20 MG Tab, Take 20 mg by mouth every day., Disp: , Rfl: 3  •  acetaminophen (TYLENOL) 500 MG Tab, Take 500 mg by mouth every 6 hours as needed for Moderate Pain., Disp: , Rfl:     PHYSICAL EXAM:  Vitals:    19 0130 19 0146 19 0230 19 0330   BP:       Pulse: 72 73 86 81   Resp: (!) 24 (!) 23 (!) 21 (!) 22   Temp:       TempSrc:       SpO2: 98% 93% 97% 97%   Weight:       Height:       , Temp (24hrs), Av.4 °C (97.5 °F), Min:36.4 °C (97.5 °F), Max:36.4 °C (97.5 °F)  , Pulse Oximetry: 97 %, O2 (LPM): 2, O2 Delivery: Nasal Cannula    General: Pt resting in NAD, cooperative   Skin:  Pink, warm and dry.  No rashes  HEENT: NC/AT. PERRL. EOMI. No nasal discharge. Oropharynx nonerythematous without exudate/plaques  Neck:   Supple without lymphadenopathy or rigidity. No JVD   Lungs:  Symmetrical.  CTAB with no W/R/R.  Good air movement   Cardiovascular:  S1/S2 RRR without M/R/G.  Abdomen:  Abdomen is soft, mild tenderness to deep palpation in the lower abdomen. +BS. No masses noted.   Extremities:  Full range of motion. No gross deformities noted. 2+ pulses in all extremities. No C/C/E   Spine:  Straight without vertebral anomalies.  CNS:  Muscle tone is normal. Cranial nerves II-XII grossly intact.     LAB TESTS:   Recent Labs      02/02/19   0821 02/03/19 0055 02/04/19 0030   WBC  6.8  7.0  6.8   RBC  3.86*  3.70*  4.43   HEMOGLOBIN  11.6*  11.1*  13.4   HEMATOCRIT  37.3  36.5*  43.1   MCV  96.6  98.6*  97.3   MCH  30.1  30.0  30.2   RDW  49.5  50.7*  49.6   PLATELETCT  249  220  268   MPV  10.7  10.5  11.1   NEUTSPOLYS  75.70*  78.20*  87.30*   LYMPHOCYTES  12.10*  10.00*  4.70*   MONOCYTES  8.40  9.00  7.50   EOSINOPHILS  2.60  1.90  0.10   BASOPHILS  0.90  0.60  0.10     Recent Labs      02/04/19   0030   TROPONINI  0.01   BNPBTYPENAT  193*     Recent Labs      02/02/19   0821 02/03/19 0055 02/04/19 0030   SODIUM  141  142  140   POTASSIUM  3.7  3.4*  3.6   CHLORIDE  110  111  106   CO2  21  22  18*   BUN  23*  16  26*   CREATININE  0.86  0.79  1.26   CALCIUM  8.8  8.2*  9.3   ALBUMIN  3.7   --   3.8       CULTURES:   Results     Procedure Component Value Units Date/Time    Influenza A/B By PCR [[id number]] Collected:  02/04/19 0033    Order Status:  Completed Specimen:  Nasal from Nasopharyngeal Updated:  02/04/19 0233     Influenza virus A RNA Negative     Influenza virus B, PCR Negative    URINALYSIS CULTURE, IF INDICATED [[id number]]     Order Status:  Sent Specimen:  Urine           IMAGES:  CT chest abdomen pelvis:  Impression         1.  Hazy interstitial bilateral lung base opacities, new since prior study, likely infiltrates.  2.  Fluid-filled prominence of small bowel with reactive mucosal pattern, could  represent ileus and/or enteritis. Evolving obstructive changes not excluded. Recommend radiographic follow-up to resolution.  3.  Bilateral nephrolithiasis without visualized obstructive change.  4.  Atrophic pancreas and mild prominence of the pancreatic duct, similar to prior study  5.  Hepatomegaly  6.  Atherosclerosis         CONSULTS:   None    ASSESSMENT/PLAN: 89 y.o. female admitted for acute nontraumatic kidney injury secondary to dehydration secondary to diarrhea possibly secondary to antibiotic intolerance (Augmentin) versus viral gastroenteritis versus bacterial enteritis (rule out C. difficile).    #Acute nontraumatic kidney injury 2/2   #Dehydration 2/2  #Diarrhea 2/2   #DDx: Augmentin intolerance vs. Viral GI vs. Bacterial enteritis (C. Diff?)  -Creatinine elevated at 1.26 which is approximately an 80% elevation from baseline  -Status post 1 L of NS fluid bolus in the emergency room  -Continue fluid resuscitation  -Trend creatinine and electrolytes  -Contact precautions while evaluating for C. Difficile, patient is at moderate risk given her hospitalization and recent antibiotic exposure  -Recommend avoiding oral Augmentin and amoxicillin in the future if clinical suspicion continues that this may be a cause of her GI upset  -Zofran PRN    #CAPNA  -Patient was discharged on Augmentin  -SIRS 1/4 (RR) low clinical suspicion of sepsis at this time  -Given C3 and azithromycin in the emergency room  -We will order continued 1 g C3 every 24 hours, day team to reevaluate antibiotic selection    #FEN/GI  -Regular diet    #Dispo  -Admit to medical floor    #Core Measures   VTE PPx: Heparin subcu every 8  Abx: Rocephin  Fluids: NS at 125  Lines and Tube: PIV  Diet: Regular diet  Code Status: DNR        Pablo Jamison M.D.   PGY-2  [facility]   [phone]

## 2019-02-05 ENCOUNTER — PATIENT OUTREACH (OUTPATIENT)
Dept: HEALTH INFORMATION MANAGEMENT | Facility: OTHER | Age: 84
End: 2019-02-05

## 2019-02-05 VITALS
HEIGHT: 65 IN | OXYGEN SATURATION: 94 % | SYSTOLIC BLOOD PRESSURE: 163 MMHG | BODY MASS INDEX: 20.64 KG/M2 | HEART RATE: 79 BPM | WEIGHT: 123.9 LBS | DIASTOLIC BLOOD PRESSURE: 59 MMHG | RESPIRATION RATE: 16 BRPM | TEMPERATURE: 99.1 F

## 2019-02-05 LAB — EKG IMPRESSION: NORMAL

## 2019-02-05 PROCEDURE — 97165 OT EVAL LOW COMPLEX 30 MIN: CPT

## 2019-02-05 PROCEDURE — 700105 HCHG RX REV CODE 258: Performed by: STUDENT IN AN ORGANIZED HEALTH CARE EDUCATION/TRAINING PROGRAM

## 2019-02-05 PROCEDURE — 700111 HCHG RX REV CODE 636 W/ 250 OVERRIDE (IP): Performed by: STUDENT IN AN ORGANIZED HEALTH CARE EDUCATION/TRAINING PROGRAM

## 2019-02-05 PROCEDURE — A9270 NON-COVERED ITEM OR SERVICE: HCPCS | Performed by: STUDENT IN AN ORGANIZED HEALTH CARE EDUCATION/TRAINING PROGRAM

## 2019-02-05 PROCEDURE — 700102 HCHG RX REV CODE 250 W/ 637 OVERRIDE(OP): Performed by: STUDENT IN AN ORGANIZED HEALTH CARE EDUCATION/TRAINING PROGRAM

## 2019-02-05 PROCEDURE — 97162 PT EVAL MOD COMPLEX 30 MIN: CPT

## 2019-02-05 RX ORDER — CEFDINIR 125 MG/5ML
300 POWDER, FOR SUSPENSION ORAL EVERY EVENING
Status: DISCONTINUED | OUTPATIENT
Start: 2019-02-05 | End: 2019-02-05 | Stop reason: HOSPADM

## 2019-02-05 RX ORDER — CEFDINIR 125 MG/5ML
300 POWDER, FOR SUSPENSION ORAL DAILY
Qty: 12 ML | Refills: 0 | Status: SHIPPED | OUTPATIENT
Start: 2019-02-05 | End: 2019-02-06

## 2019-02-05 RX ADMIN — HEPARIN SODIUM 5000 UNITS: 5000 INJECTION, SOLUTION INTRAVENOUS; SUBCUTANEOUS at 05:45

## 2019-02-05 RX ADMIN — SODIUM CHLORIDE: 9 INJECTION, SOLUTION INTRAVENOUS at 10:53

## 2019-02-05 RX ADMIN — SODIUM CHLORIDE: 9 INJECTION, SOLUTION INTRAVENOUS at 00:21

## 2019-02-05 RX ADMIN — LOVASTATIN 20 MG: 20 TABLET ORAL at 05:45

## 2019-02-05 RX ADMIN — HEPARIN SODIUM 5000 UNITS: 5000 INJECTION, SOLUTION INTRAVENOUS; SUBCUTANEOUS at 12:20

## 2019-02-05 ASSESSMENT — COGNITIVE AND FUNCTIONAL STATUS - GENERAL
SUGGESTED CMS G CODE MODIFIER DAILY ACTIVITY: CI
SUGGESTED CMS G CODE MODIFIER MOBILITY: CK
STANDING UP FROM CHAIR USING ARMS: A LITTLE
WALKING IN HOSPITAL ROOM: A LITTLE
CLIMB 3 TO 5 STEPS WITH RAILING: A LITTLE
MOVING FROM LYING ON BACK TO SITTING ON SIDE OF FLAT BED: UNABLE
MOVING TO AND FROM BED TO CHAIR: A LITTLE
TURNING FROM BACK TO SIDE WHILE IN FLAT BAD: A LITTLE
MOBILITY SCORE: 16
DAILY ACTIVITIY SCORE: 23
DRESSING REGULAR LOWER BODY CLOTHING: A LITTLE

## 2019-02-05 ASSESSMENT — GAIT ASSESSMENTS
GAIT LEVEL OF ASSIST: CONTACT GUARD ASSIST
ASSISTIVE DEVICE: FRONT WHEEL WALKER
DEVIATION: BRADYKINETIC;SHUFFLED GAIT
DISTANCE (FEET): 100

## 2019-02-05 ASSESSMENT — ACTIVITIES OF DAILY LIVING (ADL): TOILETING: INDEPENDENT

## 2019-02-05 NOTE — PROGRESS NOTES
Assumed care of pt at 1900, no c/o pain or other discomforts at that time. Meds given per MAR. Pt resting in bed now, call light in reach, no further needs at this time

## 2019-02-05 NOTE — THERAPY
"Occupational Therapy Evaluation completed.   Functional Status:  CGA with ADLs and txfs  Plan of Care: Will benefit from Occupational Therapy 3 visits  Discharge Recommendations:  Equipment: Will Continue to Assess for Equipment Needs. Post-acute therapy Currently anticipate no further skilled therapy needs once patient is discharged from the inpatient setting.    See \"Rehab Therapy-Acute\" Patient Summary Report for complete documentation.    "

## 2019-02-05 NOTE — PROGRESS NOTES
Patient sitting in the chair having breakfast with family at her bedside. A/ox4. Denies pain at this time. Pt's dtr is requesting to have a chest xray before patient gets discharged to confirm that pt does not have PNA. RN explained to dtr that there are no discharge orders in place as of today and that pt is still on contact precautions for r/o c.diff and receiving IV abx. She verbalized understanding.   Reminded to call for assistance. Personal belongings within reach. Reinforced fall precautions Call light and personal belongings within reach, bed kept low, treaded socks on. Assisted as necessary. Kept rested and comfortable at all times. Hourly rounding.

## 2019-02-05 NOTE — CARE PLAN
Problem: Discharge Barriers/Planning  Goal: Patient's continuum of care needs will be met    Intervention: Involve patient and significant other/support system in setting and prioritizing goals for hospital stay and discharge  Discussed pt's stay and possible eventual discharge with pt and daughter

## 2019-02-05 NOTE — THERAPY
"Physical Therapy Evaluation completed.   Bed Mobility:  Supine to Sit: Stand by Assist  Transfers: Sit to Stand: Contact Guard Assist  Gait: Level Of Assist: Contact Guard Assist with Front-Wheel Walker       Plan of Care: Will benefit from Physical Therapy 3 times per week  Discharge Recommendations: Equipment: Will Continue to Assess for Equipment Needs. Post-acute therapy Recommend outpatient or home health transitional care services for continued physical therapy services.      See \"Rehab Therapy-Acute\" Patient Summary Report for complete documentation.     "

## 2019-02-05 NOTE — DISCHARGE PLANNING
Medical Social Work  PC from Dr. Correa, explained reason for call, SW told patient will d/c today.  Dr. Correa stated the physican's statement can be completed prior to d/c.  SW will place physician statement in patient's chart.

## 2019-02-05 NOTE — DISCHARGE PLANNING
Medical Social Work  [facility] , Jessy Brooke in to see the patient, requested an update on when patient will be d/c.  Also requested a physicians report be completed.    Paged [facility], Dr Leary

## 2019-02-06 ENCOUNTER — PATIENT OUTREACH (OUTPATIENT)
Dept: HEALTH INFORMATION MANAGEMENT | Facility: OTHER | Age: 84
End: 2019-02-06

## 2019-02-06 NOTE — DISCHARGE INSTRUCTIONS
Discharge Instructions    Discharged to [facility] by car with relative. Discharged via wheelchair, hospital escort: Yes.  Special equipment needed: Cane    Be sure to schedule a follow-up appointment with your primary care doctor or any specialists as instructed.     Discharge Plan:   Diet Plan: Discussed  Activity Level: Discussed  Confirmed Follow up Appointment: Patient to Call and Schedule Appointment  Confirmed Symptoms Management: Discussed  Medication Reconciliation Updated: Yes  Influenza Vaccine Indication: Not indicated: Previously immunized this influenza season and > 8 years of age    I understand that a diet low in cholesterol, fat, and sodium is recommended for good health. Unless I have been given specific instructions below for another diet, I accept this instruction as my diet prescription.   Other diet: Regular     Special Instructions: None    · Is patient discharged on Warfarin / Coumadin?   No     Depression / Suicide Risk    As you are discharged from this [facility] facility, it is important to learn how to keep safe from harming yourself.    Recognize the warning signs:  · Abrupt changes in personality, positive or negative- including increase in energy   · Giving away possessions  · Change in eating patterns- significant weight changes-  positive or negative  · Change in sleeping patterns- unable to sleep or sleeping all the time   · Unwillingness or inability to communicate  · Depression  · Unusual sadness, discouragement and loneliness  · Talk of wanting to die  · Neglect of personal appearance   · Rebelliousness- reckless behavior  · Withdrawal from people/activities they love  · Confusion- inability to concentrate     If you or a loved one observes any of these behaviors or has concerns about self-harm, here's what you can do:  · Talk about it- your feelings and reasons for harming yourself  · Remove any means that you might use to hurt yourself (examples: pills, rope, extension cords,  firearm)  · Get professional help from the community (Mental Health, Substance Abuse, psychological counseling)  · Do not be alone:Call your Safe Contact- someone whom you trust who will be there for you.  · Call your local CRISIS HOTLINE 221-9755 or 121-247-9476  · Call your local Children's Mobile Crisis Response Team [place] (618) 625-3044 or www.AAMPP  · Call the toll free National Suicide Prevention Hotlines   · National Suicide Prevention Lifeline 309-793-ZGKJ (4288)  · [employer] Line Network 800-SUICIDE (035-6454)      Bronchitis  Bronchitis is the body's way of reacting to injury and/or infection (inflammation) of the bronchi. Bronchi are the air tubes that extend from the windpipe into the lungs. If the inflammation becomes severe, it may cause shortness of breath.  CAUSES   Inflammation may be caused by:  · A virus.  · Germs (bacteria).  · Dust.  · Allergens.  · Pollutants and many other irritants.  The cells lining the bronchial tree are covered with tiny hairs (cilia). These constantly beat upward, away from the lungs, toward the mouth. This keeps the lungs free of pollutants. When these cells become too irritated and are unable to do their job, mucus begins to develop. This causes the characteristic cough of bronchitis. The cough clears the lungs when the cilia are unable to do their job. Without either of these protective mechanisms, the mucus would settle in the lungs. Then you would develop pneumonia.  Smoking is a common cause of bronchitis and can contribute to pneumonia. Stopping this habit is the single most important thing you can do to help yourself.  TREATMENT   · Your caregiver may prescribe an antibiotic if the cough is caused by bacteria. Also, medicines that open up your airways make it easier to breathe. Your caregiver may also recommend or prescribe an expectorant. It will loosen the mucus to be coughed up. Only take over-the-counter or prescription medicines for  pain, discomfort, or fever as directed by your caregiver.  · Removing whatever causes the problem (smoking, for example) is critical to preventing the problem from getting worse.  · Cough suppressants may be prescribed for relief of cough symptoms.  · Inhaled medicines may be prescribed to help with symptoms now and to help prevent problems from returning.  · For those with recurrent (chronic) bronchitis, there may be a need for steroid medicines.  SEEK IMMEDIATE MEDICAL CARE IF:   · During treatment, you develop more pus-like mucus (purulent sputum).  · You have a fever.  · Your baby is older than 3 months with a rectal temperature of 102° F (38.9° C) or higher.  · Your baby is 3 months old or younger with a rectal temperature of 100.4° F (38° C) or higher.  · You become progressively more ill.  · You have increased difficulty breathing, wheezing, or shortness of breath.  It is necessary to seek immediate medical care if you are elderly or sick from any other disease.  MAKE SURE YOU:   · Understand these instructions.  · Will watch your condition.  · Will get help right away if you are not doing well or get worse.  Document Released: 12/18/2006 Document Revised: 03/11/2013 Document Reviewed: 10/27/2009  [employer]® Patient Information ©2014 [employer].

## 2019-02-06 NOTE — DISCHARGE SUMMARY
"DISCHARGE SUMMARY     ADMIT DATE: 2019       DISCHARGE DATE: 2019    SERVICE: [facility]  ATTENDING PHYSICIAN: Dr. Estevez  SENIOR RESIDENT: Dr. Alejo  MARTHA RESIDENT: Dr. Manny Correa      24 hour events: Patient was weaned down to room air overnight, tolerated well with saturations in the 90s.  Patient has had no diarrhea since yesterday morning, p.o. appetite has improved but is not back to baseline.  Patient is walking well around the unit has no complaints of weakness at this time.  Continues to cough but it is improved and is nonproductive.    Objective:  Vitals:  Temp (24hrs), Av.5 °C (97.7 °F), Min:36.1 °C (97 °F), Max:36.8 °C (98.2 °F)      Blood pressure 132/50, pulse 60, temperature 36.6 °C (97.9 °F), temperature source Temporal, resp. rate 16, height 1.651 m (5' 5\"), weight 56.2 kg (123 lb 14.4 oz), SpO2 95 %, not currently breastfeeding.   Oxygen: Pulse Oximetry: 95 %, O2 (LPM): 1, O2 Delivery: Silicone Nasal Cannula    In/Out:  I/O last 3 completed shifts:  In: 580 [P.O.:580]  Out: 200 [Urine:200]    Physical Exam  Gen:  NAD  HEENT: MMM, EOMI  Cardio: RRR, clear s1/s2, no murmur  Resp:  Equal bilat, faint crackles right greater than left, mild wheezing bilaterally  GI/: Soft, non-distended, no TTP, normal bowel sounds, no guarding/rebound  Neuro: Non-focal, Gross intact, no deficits  Skin/Extremities: Cap refill <3sec, warm/well perfused, no rash, normal extremities          FINAL DIAGNOSES:   Hypoxia 2/2 bronchitis  Diarrhea 2/2 Augmentin    HOSPITAL COURSE:   Patient was admitted 19 for hypoxia 2/2 bronchitis and diarrhea secondary to Augmentin.  Approximately 2 hours after patient's first dose of Augmentin at home, she complained of profuse watery diarrhea.  On presentation to the emergency room Patient was found to be dehydrated, was fluid resuscitated and given antibiotics for coverage of community-acquired pneumonia.  Orders were put in for C. difficile sample, " however prior to being collected patient's stool became more solid.  The night of 2/4 patient was weaned to room air, and tolerated well without any desaturations below 90%.  On 2/5 patient was found to be stable for discharge back to [facility].  No antibiotics were given, this patient most likely had bronchitis, and was already given 4 days of oral and IV antibiotics while inpatient.  She denied wanting fifth day of treatment, due to reaction to Augmentin at home.    CONSULTANTS:   none    PROCEDURES:   none    IMAGING/ LABS:     CT-CTA CHEST PULMONARY ARTERY W/ RECONS   Final Result         1.  No large central pulmonary embolus is appreciated, evaluation of the subsegmental branches is essentially nondiagnostic due to motion artifacts. Additional imaging would be required for definitive exclusion of small distal pulmonary emboli.   2.  Atherosclerosis and atherosclerotic coronary artery disease.   3.  Hepatomegaly   4.  Bilateral pulmonary nodules measuring up to 1.9 cm, see nodule follow-up recommendations below.      Low Risk: CT at 3-6 months, then consider CT at 18-24 months      High Risk: CT at 3-6 months, then at 18-24 months      Comments: Use most suspicious nodule as guide to management. Follow-up intervals may vary according to size and risk.      Low Risk - Minimal or absent history of smoking and of other known risk factors.      High Risk - History of smoking or of other known risk factors.      Note: These recommendations do not apply to lung cancer screening, patients with immunosuppression, or patients with known primary cancer.      Fleischner Society 2017 Guidelines for Management of Incidentally Detected Pulmonary Nodules in Adults         CT-ABDOMEN-PELVIS WITH   Final Result         1.  Hazy interstitial bilateral lung base opacities, new since prior study, likely infiltrates.   2.  Fluid-filled prominence of small bowel with reactive mucosal pattern, could represent ileus and/or  enteritis. Evolving obstructive changes not excluded. Recommend radiographic follow-up to resolution.   3.  Bilateral nephrolithiasis without visualized obstructive change.   4.  Atrophic pancreas and mild prominence of the pancreatic duct, similar to prior study   5.  Hepatomegaly   6.  Atherosclerosis            Recent Labs      02/03/19 0055 02/04/19 0030   WBC  7.0  6.8   RBC  3.70*  4.43   HEMOGLOBIN  11.1*  13.4   HEMATOCRIT  36.5*  43.1   MCV  98.6*  97.3   MCH  30.0  30.2   RDW  50.7*  49.6   PLATELETCT  220  268   MPV  10.5  11.1   NEUTSPOLYS  78.20*  87.30*   LYMPHOCYTES  10.00*  4.70*   MONOCYTES  9.00  7.50   EOSINOPHILS  1.90  0.10   BASOPHILS  0.60  0.10     No results found for: [id number], FOLATE, FERRITIN, IRON, TOTIRONBC    Estimated GFR/CRCL = Estimated Creatinine Clearance: 25.3 mL/min (by C-G formula based on SCr of 1.26 mg/dL).  Recent Labs      02/03/19 0055 02/04/19 0030   SODIUM  142  140   POTASSIUM  3.4*  3.6   CHLORIDE  111  106   CO2  22  18*   BUN  16  26*   CREATININE  0.79  1.26   CALCIUM  8.2*  9.3   ALBUMIN   --   3.8       Recent Labs      02/04/19 0030   ALTSGPT  8   ASTSGOT  22   ALKPHOSPHAT  125*   TBILIRUBIN  0.5   LIPASE  35   ALBUMIN  3.8   GLOBULIN  4.1*       No results for input(s): POCGLUCOSE in the last 72 hours.  No results found for: HBA1C, TSH, FREET4, FREET3, CORTISOL    DISCHARGE MEDICATIONS:     (x)  Medication Reconciliation Completed     Medication List      START taking these medications      Instructions   cefDINIR 125 MG/5ML Susr  Commonly known as:  OMNICEF   Take 12 mL by mouth every day for 1 day.  Dose:  300 mg        CONTINUE taking these medications      Instructions   acetaminophen 500 MG Tabs  Commonly known as:  TYLENOL   Take 500 mg by mouth every 6 hours as needed for Moderate Pain.  Dose:  500 mg     lovastatin 20 MG Tabs  Commonly known as:  MEVACOR   Take 20 mg by mouth every day.  Dose:  20 mg        STOP taking these medications     amoxicillin-clavulanate 875-125 MG Tabs  Commonly known as:  AUGMENTIN            DISPOSITION: to [facility]    DIET: as tolerated    ACTIVITY: As tolerated         INSTRUCTIONS:      The patient was instructed to call primary care provider or return to the ER in the event of worsening symptoms. I have counseled the patient on the importance of compliance and the patient has agreed to proceed with all medical recommendations and follow up plan indicated above.   The patient understands that all medications come with benefits and risks. Risks may include permanent injury or death and these risks can be minimized with close reassessment and monitoring.        PCP:   Umberto Solis M.D.  Discharge summary faxed to primary care provider  Copy of discharge summary given to the patient    F/U APPOINTMENT:   Umberto Solis M.D. In 1 week as scheduled

## 2019-02-06 NOTE — PROGRESS NOTES
Stool sample incomplete d/t pt having a small bowel movement that was contaminated with urine, per night shift RN. No other BMs during my shift.

## 2019-02-11 ENCOUNTER — PATIENT OUTREACH (OUTPATIENT)
Dept: HEALTH INFORMATION MANAGEMENT | Facility: OTHER | Age: 84
End: 2019-02-11

## 2019-03-07 ENCOUNTER — PATIENT OUTREACH (OUTPATIENT)
Dept: HEALTH INFORMATION MANAGEMENT | Facility: OTHER | Age: 84
End: 2019-03-07

## 2019-03-11 NOTE — PROGRESS NOTES
Mila Scanlon was admitted for pneumonia on 2/4/19, once treated she was discharged on 2/5/19. [facility] patient advocate assisted with multiple discharge needs including 2 appointments, 2 Primary Care Physician appointment which were kept. Patient is also scheduled for 1 future follow up appointment with, Primary Care Physician on 4/18. [facility] Patient Advocate did not conduct a PPS Screening as the LACE + was at a 16.

## 2019-03-13 ENCOUNTER — HOSPITAL ENCOUNTER (OUTPATIENT)
Dept: RADIOLOGY | Facility: MEDICAL CENTER | Age: 84
End: 2019-03-13
Attending: FAMILY MEDICINE
Payer: MEDICARE

## 2019-03-13 DIAGNOSIS — J18.9 COMMUNITY ACQUIRED PNEUMONIA, UNSPECIFIED LATERALITY: ICD-10-CM

## 2019-03-13 PROCEDURE — 71046 X-RAY EXAM CHEST 2 VIEWS: CPT

## 2019-03-20 ENCOUNTER — APPOINTMENT (RX ONLY)
Dept: URBAN - METROPOLITAN AREA CLINIC 4 | Facility: CLINIC | Age: 84
Setting detail: DERMATOLOGY
End: 2019-03-20

## 2019-03-20 DIAGNOSIS — D18.0 HEMANGIOMA: ICD-10-CM

## 2019-03-20 DIAGNOSIS — L21.8 OTHER SEBORRHEIC DERMATITIS: ICD-10-CM

## 2019-03-20 DIAGNOSIS — L82.1 OTHER SEBORRHEIC KERATOSIS: ICD-10-CM

## 2019-03-20 DIAGNOSIS — D22 MELANOCYTIC NEVI: ICD-10-CM

## 2019-03-20 DIAGNOSIS — L81.4 OTHER MELANIN HYPERPIGMENTATION: ICD-10-CM

## 2019-03-20 PROBLEM — D22.61 MELANOCYTIC NEVI OF RIGHT UPPER LIMB, INCLUDING SHOULDER: Status: ACTIVE | Noted: 2019-03-20

## 2019-03-20 PROBLEM — D18.01 HEMANGIOMA OF SKIN AND SUBCUTANEOUS TISSUE: Status: ACTIVE | Noted: 2019-03-20

## 2019-03-20 PROBLEM — D22.5 MELANOCYTIC NEVI OF TRUNK: Status: ACTIVE | Noted: 2019-03-20

## 2019-03-20 PROBLEM — D22.71 MELANOCYTIC NEVI OF RIGHT LOWER LIMB, INCLUDING HIP: Status: ACTIVE | Noted: 2019-03-20

## 2019-03-20 PROBLEM — L57.0 ACTINIC KERATOSIS: Status: ACTIVE | Noted: 2019-03-20

## 2019-03-20 PROBLEM — D48.5 NEOPLASM OF UNCERTAIN BEHAVIOR OF SKIN: Status: ACTIVE | Noted: 2019-03-20

## 2019-03-20 PROBLEM — D22.62 MELANOCYTIC NEVI OF LEFT UPPER LIMB, INCLUDING SHOULDER: Status: ACTIVE | Noted: 2019-03-20

## 2019-03-20 PROBLEM — E78.5 HYPERLIPIDEMIA, UNSPECIFIED: Status: ACTIVE | Noted: 2019-03-20

## 2019-03-20 PROBLEM — Z85.828 PERSONAL HISTORY OF OTHER MALIGNANT NEOPLASM OF SKIN: Status: ACTIVE | Noted: 2019-03-20

## 2019-03-20 PROBLEM — D22.72 MELANOCYTIC NEVI OF LEFT LOWER LIMB, INCLUDING HIP: Status: ACTIVE | Noted: 2019-03-20

## 2019-03-20 PROCEDURE — ? PRESCRIPTION SAMPLES PROVIDED

## 2019-03-20 PROCEDURE — ? BIOPSY BY SHAVE METHOD AND DESTRUCTION

## 2019-03-20 PROCEDURE — ? SUNSCREEN RECOMMENDATIONS

## 2019-03-20 PROCEDURE — ? COUNSELING

## 2019-03-20 PROCEDURE — 99203 OFFICE O/P NEW LOW 30 MIN: CPT | Mod: 25

## 2019-03-20 PROCEDURE — 11102 TANGNTL BX SKIN SINGLE LES: CPT

## 2019-03-20 ASSESSMENT — LOCATION DETAILED DESCRIPTION DERM
LOCATION DETAILED: RIGHT CENTRAL MALAR CHEEK
LOCATION DETAILED: RIGHT DISTAL POSTERIOR UPPER ARM
LOCATION DETAILED: LEFT INFERIOR ANTERIOR NECK
LOCATION DETAILED: RIGHT RADIAL DORSAL HAND
LOCATION DETAILED: LEFT CENTRAL MALAR CHEEK
LOCATION DETAILED: LEFT SUPERIOR MEDIAL FOREHEAD
LOCATION DETAILED: LEFT SUPERIOR MEDIAL UPPER BACK
LOCATION DETAILED: RIGHT SUPERIOR MEDIAL MIDBACK
LOCATION DETAILED: SUPERIOR THORACIC SPINE
LOCATION DETAILED: PERIUMBILICAL SKIN
LOCATION DETAILED: RIGHT RIB CAGE
LOCATION DETAILED: LEFT PROXIMAL POSTERIOR UPPER ARM
LOCATION DETAILED: RIGHT ANTERIOR PROXIMAL THIGH
LOCATION DETAILED: LEFT PROXIMAL DORSAL FOREARM
LOCATION DETAILED: LEFT ULNAR DORSAL HAND
LOCATION DETAILED: POSTERIOR MID-PARIETAL SCALP
LOCATION DETAILED: RIGHT PROXIMAL DORSAL FOREARM
LOCATION DETAILED: LEFT ANTERIOR PROXIMAL THIGH

## 2019-03-20 ASSESSMENT — LOCATION SIMPLE DESCRIPTION DERM
LOCATION SIMPLE: LEFT FOREARM
LOCATION SIMPLE: LEFT THIGH
LOCATION SIMPLE: RIGHT POSTERIOR UPPER ARM
LOCATION SIMPLE: RIGHT HAND
LOCATION SIMPLE: LEFT HAND
LOCATION SIMPLE: LEFT CHEEK
LOCATION SIMPLE: POSTERIOR SCALP
LOCATION SIMPLE: LEFT FOREHEAD
LOCATION SIMPLE: RIGHT THIGH
LOCATION SIMPLE: RIGHT CHEEK
LOCATION SIMPLE: LEFT UPPER BACK
LOCATION SIMPLE: LEFT ANTERIOR NECK
LOCATION SIMPLE: ABDOMEN
LOCATION SIMPLE: RIGHT LOWER BACK
LOCATION SIMPLE: RIGHT FOREARM
LOCATION SIMPLE: UPPER BACK
LOCATION SIMPLE: LEFT POSTERIOR UPPER ARM

## 2019-03-20 ASSESSMENT — LOCATION ZONE DERM
LOCATION ZONE: ARM
LOCATION ZONE: FACE
LOCATION ZONE: LEG
LOCATION ZONE: NECK
LOCATION ZONE: SCALP
LOCATION ZONE: TRUNK
LOCATION ZONE: HAND

## 2019-03-20 NOTE — HPI: FULL BODY SKIN EXAMINATION
How Severe Are Your Spot(S)?: mild
What Type Of Note Output Would You Prefer (Optional)?: Standard Output
What Is The Reason For Today's Visit?: Full Body Skin Examination with No Concerns
What Is The Reason For Today's Visit? (Being Monitored For X): concerning skin lesions on an annual basis
Additional History: No concerns. New patient. FBE.

## 2019-03-20 NOTE — PROCEDURE: BIOPSY BY SHAVE METHOD AND DESTRUCTION
Wound Care: Vaseline
Billing Type: Third-Party Bill
Hemostasis: Aluminum Chloride and Electrocautery
Consent: Written consent was obtained and risks were reviewed including but not limited to scarring, infection, bleeding, scabbing, incomplete removal, nerve damage and allergy to anesthesia.
Size Of Lesion After Curettage: 0.7
Lab Facility: 
Post-Care Instructions: I reviewed with the patient in detail post-care instructions. Patient is to keep the biopsy site dry overnight, and then apply bacitracin twice daily until healed. Patient may apply hydrogen peroxide soaks to remove any crusting.
Anesthesia Volume In Cc: 2
Render Post-Care Instructions In Note?: no
Anesthesia Type: 1% lidocaine with epinephrine
Bill As?: Biopsy by Shave Method
Number Of Curettages: 3
Destruction Type: electrodesiccation
Detail Level: Detailed
Biopsy Type: H and E
Notification Instructions: Patient will be notified of biopsy results. However, patient instructed to call the office if not contacted within 2 weeks.
Size Of Lesion In Cm (Optional): 0.6
Lab: [id number]

## 2019-04-19 ENCOUNTER — HOSPITAL ENCOUNTER (OUTPATIENT)
Dept: PAIN MANAGEMENT | Facility: REHABILITATION | Age: 84
End: 2019-04-19
Attending: PHYSICAL MEDICINE & REHABILITATION
Payer: MEDICARE

## 2019-04-19 ENCOUNTER — HOSPITAL ENCOUNTER (OUTPATIENT)
Dept: RADIOLOGY | Facility: REHABILITATION | Age: 84
End: 2019-04-19
Attending: PHYSICAL MEDICINE & REHABILITATION

## 2019-04-19 VITALS
DIASTOLIC BLOOD PRESSURE: 77 MMHG | HEART RATE: 68 BPM | SYSTOLIC BLOOD PRESSURE: 193 MMHG | RESPIRATION RATE: 16 BRPM | OXYGEN SATURATION: 94 % | BODY MASS INDEX: 20.28 KG/M2 | TEMPERATURE: 97.9 F | WEIGHT: 121.69 LBS | HEIGHT: 65 IN

## 2019-04-19 PROCEDURE — 700111 HCHG RX REV CODE 636 W/ 250 OVERRIDE (IP)

## 2019-04-19 PROCEDURE — 700117 HCHG RX CONTRAST REV CODE 255

## 2019-04-19 PROCEDURE — G0260 INJ FOR SACROILIAC JT ANESTH: HCPCS

## 2019-04-19 PROCEDURE — 700101 HCHG RX REV CODE 250

## 2019-04-19 RX ORDER — LIDOCAINE HYDROCHLORIDE 20 MG/ML
INJECTION, SOLUTION EPIDURAL; INFILTRATION; INTRACAUDAL; PERINEURAL
Status: COMPLETED
Start: 2019-04-19 | End: 2019-04-19

## 2019-04-19 RX ORDER — TRIAMCINOLONE ACETONIDE 40 MG/ML
INJECTION, SUSPENSION INTRA-ARTICULAR; INTRAMUSCULAR
Status: COMPLETED
Start: 2019-04-19 | End: 2019-04-19

## 2019-04-19 RX ADMIN — TRIAMCINOLONE ACETONIDE 80 MG: 40 INJECTION, SUSPENSION INTRA-ARTICULAR; INTRAMUSCULAR at 10:00

## 2019-04-19 RX ADMIN — LIDOCAINE HYDROCHLORIDE 5 ML: 20 INJECTION, SOLUTION EPIDURAL; INFILTRATION; INTRACAUDAL; PERINEURAL at 10:00

## 2019-04-19 RX ADMIN — IOHEXOL 5 ML: 240 INJECTION, SOLUTION INTRATHECAL; INTRAVASCULAR; INTRAVENOUS; ORAL at 10:00

## 2019-04-19 NOTE — PROCEDURES
DATE OF SERVICE:  04/19/2019    NAME OF PROCEDURE:    1.  Left sacroiliac joint steroid injection with 40 mg of Kenalog under   fluoroscopic guidance.  2.  Right sacroiliac joint steroid injection with 40 mg of Kenalog under   fluoroscopic guidance.    INDICATION:  This is a patient of Dr. Sanchez's with recurrent low back pain   felt to be due to sacroiliitis.  For this reason, sacroiliac joint injections   were chosen for today's procedure.    DESCRIPTION OF PROCEDURE:  After appropriate informed consent was obtained,   the patient was placed prone on the table.  Her skin was thoroughly cleansed   with ChloraPrep swab.  Then, the subcutaneous, intramuscular, interligamentous   region was anesthetized with lidocaine.  A 3-1/2 inch 22-gauge spinal needle   was directed under intermittent fluoroscopic guidance at the left SI joint   followed by the right SI joint.      ARTHROGRAM:  Once the joints were felt to have been cannulated, a small amount   of Omnipaque was placed.  This confirmed needle tip placement by darkening   the joint line with dye in the upper and middle joint with a good spread   pattern.  Kenalog 40 mg along with 1 mL of 2% lidocaine was placed in each of   the joints.  She tolerated the procedure well without procedure complications.    She will be referred to the recovery area and follow up in the office in the   next week to monitor the response to injection.       ____________________________________     DARINEL DOSHI MD    AT / [facility]    DD:  04/19/2019 11:10:38  DT:  04/19/2019 11:33:29    D#:  [id number]  Job#:  [id number]    cc: AL CRAIG MD, Al Sanchez MD

## 2019-04-19 NOTE — NON-PROVIDER
Current medications reviewed with pt, see medications reconciliation form. Pt steve taking ASA or other blood thinners or anti-inflammatories.  Pt has a ride post-procedure(Elsy to drive).  Printed and verbal discharge instructions given to pt who verbalized understanding.

## 2019-04-22 ENCOUNTER — NON-PROVIDER VISIT (OUTPATIENT)
Dept: URGENT CARE | Facility: CLINIC | Age: 84
End: 2019-04-22

## 2019-04-22 DIAGNOSIS — Z11.1 ENCOUNTER FOR PPD TEST: ICD-10-CM

## 2019-04-22 PROCEDURE — 86580 TB INTRADERMAL TEST: CPT | Performed by: PHYSICIAN ASSISTANT

## 2019-04-22 NOTE — PROGRESS NOTES
Mila Scanlon is a 89 y.o. female here for a non-provider visit for PPD placement -- Step 1 of 1    Reason for PPD:  nursing home requirement    1. TB evaluation questionnaire completed by patient? Yes      -  If any answers marked yes did you contact a provider prior to placing? Not Indicated  2.  Patient notified to return to clinic for reading on: Wednesday, April 24th 2019 AFTER 2:19pm or Thursday, April 25th, 2019 BEFORE 2:19pm.  3.  PPD Placement documentation completed on TB evaluation questionnaire? Yes  4.  Location of TB evaluation questionnaire filed: [id number] [facility] [id number]

## 2019-04-24 ENCOUNTER — NON-PROVIDER VISIT (OUTPATIENT)
Dept: URGENT CARE | Facility: CLINIC | Age: 84
End: 2019-04-24

## 2019-04-24 LAB — TB WHEAL 3D P 5 TU DIAM: NORMAL MM

## 2019-09-06 NOTE — NON-PROVIDER
Malnutrition Findings:     BMI Findings: Body mass index is 25 39 kg/m²  · Nutrition consult  · Encourage PO intake  · Nutritional supplements    · Patient dislikes clear liquid diet, advancement per GI Mila Scanlon is a 89 y.o. female here for a non-provider visit for PPD reading -- Step 1 of 2.      1.  Resulted in Epic under enter/edit results? Yes   2.  TB evaluation questionnaire scanned into chart and original given to patient?Yes      3. Was induration greater than 0 mm? No.    If Step 1 of 2, when is patient returning for second step (delete if N/A):     Routed to PCP? No

## 2023-12-08 NOTE — PROGRESS NOTES
[facility] FAMILY MEDICINE PROGRESS NOTE     Attending: Dr. Gastelum    Resident: Dr. Calix    PATIENT: Mila Scanlon; [id number]; 9/20/1926    ID: 89 y.o. female with h/o HTN, HLD admitted for RLE cellulitis.    SUBJECTIVE: No acute events overnight. Feels well this morning. Per her daughter, her confusion and anxiety are much improved. She has the bed by the window. She denies any pain, cp, sob, n/v.    OBJECTIVE:     Filed Vitals:    07/07/17 1735 07/07/17 1945 07/08/17 0100 07/08/17 0432   BP: 179/62 134/64  144/79   Pulse: 77 83  76   Temp: 36.9 °C (98.5 °F) 36.9 °C (98.5 °F)  36.8 °C (98.2 °F)   TempSrc:       Resp: 16 18 18   Height:       Weight:   53 kg (116 lb 13.5 oz)    SpO2: 97% 92%  92%       Intake/Output Summary (Last 24 hours) at 07/08/17 0630  Last data filed at 07/08/17 0500   Gross per 24 hour   Intake    860 ml   Output      0 ml   Net    860 ml       PE:  General: No acute distress, resting comfortably in bed.  HEENT: NC/AT. EOMI. MMM  Cardiovascular: RRR with no M/R/G.  Respiratory: Symmetrical chest. CTAB with no W/R/R  Abdomen: soft, NT/ND, no masses, +BS   EXT:  HULL, No C/C 2+ pulses, erythema much improved, wounds with dressing  Neuro: non focal with no numbness, tingling or changes in sensation    LABS:  Recent Labs      07/05/17   0850  07/06/17   0532  07/07/17   0806   WBC  6.1  4.6*  5.8   RBC  3.80*  3.36*  3.53*   HEMOGLOBIN  10.7*  9.3*  10.0*   HEMATOCRIT  34.8*  30.9*  31.9*   MCV  91.6  92.0  90.4   MCH  28.2  27.7  28.3   RDW  57.7*  57.7*  55.3*   PLATELETCT  274  239  251   MPV  11.4  11.5  10.9   NEUTSPOLYS  70.90  67.30   --    LYMPHOCYTES  14.60*  16.50*   --    MONOCYTES  10.80  10.60   --    EOSINOPHILS  2.60  3.90   --    BASOPHILS  0.80  1.30   --      Recent Labs      07/05/17   0850  07/07/17   1154  07/08/17   0402   SODIUM  141  141  142   POTASSIUM  4.7  4.1  3.8   CHLORIDE  110  113*  113*   CO2  20  18*  21   BUN  49*  24*  19   CREATININE  1.40  0.98  0.92   CALCIUM  9.0   8.7  8.6   ALBUMIN  3.6   --    --      Estimated GFR/CRCL = Estimated Creatinine Clearance: 34 mL/min (by C-G formula based on Cr of 0.92).  Recent Labs      07/05/17   0850  07/07/17   1154  07/08/17   0402   GLUCOSE  93  85  91     Recent Labs      07/05/17   0850   ASTSGOT  15   ALTSGPT  10   TBILIRUBIN  0.3   ALKPHOSPHAT  119*   GLOBULIN  3.5               Invalid input(s): [id number]  No results for input(s): INR, APTT, FIBRINOGEN in the last 72 hours.    Invalid input(s): DIMER    MICROBIOLOGY:   7/5  BCx: NTD x2    MEDS:  Current Facility-Administered Medications   Medication Last Dose   • lisinopril (PRINIVIL) tablet 20 mg 20 mg at 07/07/17 1026   • vancomycin 1,000 mg in  mL IVPB Stopped at 07/07/17 1831   • lactated ringers infusion     • labetalol (NORMODYNE,TRANDATE) injection 10 mg     • lovastatin (MEVACOR) tablet 20 mg 20 mg at 07/07/17 2039   • senna-docusate (PERICOLACE or SENOKOT S) 8.6-50 MG per tablet 2 Tab Stopped at 07/07/17 2040    And   • polyethylene glycol/lytes (MIRALAX) PACKET 1 Packet 1 Packet at 07/06/17 0819    And   • magnesium hydroxide (MILK OF MAGNESIA) suspension 30 mL      And   • bisacodyl (DULCOLAX) suppository 10 mg     • enoxaparin (LOVENOX) inj 30 mg Stopped at 07/07/17 0900   • ondansetron (ZOFRAN) syringe/vial injection 4 mg     • ondansetron (ZOFRAN ODT) dispertab 4 mg     • acetaminophen (TYLENOL) tablet 650 mg     • hydrocodone-acetaminophen (NORCO) 5-325 MG per tablet 1 Tab 1 Tab at 07/06/17 0726   • MD ALERT... vancomycin per pharmacy protocol         PROBLEM LIST:  Problem Noted   Cellulitis 7/5/2017       ASSESSMENT/PLAN: 89 yo female with admitted with worsening RLE cellulitis. HD#4.    RLE Cellulitis  - failed outpatient keflex  - improving on vancomycin   - cont vancomycin (day 4)   - likely plan to transition to oral antibiotic today (probably bactrim)   - if does well for the day, probable discharge tomorrow   - pain control   - has f/u with PCP  already scheduled Tuesday, 7/11    HTN  - SBP up to 170s  - currently on lisinopril 20mg (dbl home dose)  - required labetalol last night   - no changes to meds today   - will see if there is a pattern throughout the day in order to refine the regimen further    Bradycardia - resolved  - transferred of tele yesterday    Anemia  - Hb stable around 10  - FOBT neg    DVT PPx: enoxaparin, no SCDs  Abx: vancomycin  Lines: PIV  PCP: Will  Code: DNR    Dispo: inpatient, manage infection and HTN         Normal gait / station Normal gait / station Normal gait / station Normal gait / station Normal gait / station Normal gait / station Normal gait / station Normal gait / station Normal gait / station

## 2025-06-20 NOTE — CARE PLAN
Pharmacy faxed in a request for prior authorization on:06/19/2025    Medication: zolmitriptan (ZOMIG) 5 MG tablet   Dosage:  Route: Take 1 tablet by mouth as needed for Migraine (max 2/24 hr). Take 1 tablet by mouth at onset of migraine. May repeat after 2 hours if needed. - Oral   Quantity requested:  12 tablet   Pharmacy for prescription has been selected.  Add Key or PA number if applicable [id number]  Initiation of prior authorization needed.    Problem: Communication  Goal: The ability to communicate needs accurately and effectively will improve  Outcome: PROGRESSING AS EXPECTED  Pt able to effectively communicate needs to staff members. Continue to encourage communication with staff as well as anticipate possible future needs      Problem: Safety  Goal: Will remain free from injury  Outcome: PROGRESSING AS EXPECTED  Pt remains free from injury/falls this shift. Continue to assess for risks for injury/fall and implement prevention measures as needed